# Patient Record
Sex: MALE | Race: WHITE | Employment: OTHER | URBAN - METROPOLITAN AREA
[De-identification: names, ages, dates, MRNs, and addresses within clinical notes are randomized per-mention and may not be internally consistent; named-entity substitution may affect disease eponyms.]

---

## 2017-01-05 ENCOUNTER — OFFICE VISIT (OUTPATIENT)
Dept: FAMILY MEDICINE CLINIC | Age: 62
End: 2017-01-05

## 2017-01-05 VITALS
WEIGHT: 190.8 LBS | SYSTOLIC BLOOD PRESSURE: 120 MMHG | HEIGHT: 69 IN | HEART RATE: 81 BPM | DIASTOLIC BLOOD PRESSURE: 70 MMHG | RESPIRATION RATE: 13 BRPM | BODY MASS INDEX: 28.26 KG/M2

## 2017-01-05 DIAGNOSIS — M17.12 PRIMARY OSTEOARTHRITIS OF LEFT KNEE: ICD-10-CM

## 2017-01-05 DIAGNOSIS — Z12.5 SCREENING PSA (PROSTATE SPECIFIC ANTIGEN): ICD-10-CM

## 2017-01-05 DIAGNOSIS — E66.3 OVERWEIGHT (BMI 25.0-29.9): ICD-10-CM

## 2017-01-05 DIAGNOSIS — R73.01 IMPAIRED FASTING BLOOD SUGAR: ICD-10-CM

## 2017-01-05 DIAGNOSIS — I10 ESSENTIAL HYPERTENSION: Primary | ICD-10-CM

## 2017-01-05 PROCEDURE — 99214 OFFICE O/P EST MOD 30 MIN: CPT | Performed by: NURSE PRACTITIONER

## 2017-01-05 ASSESSMENT — ENCOUNTER SYMPTOMS
COUGH: 0
NAUSEA: 0
SHORTNESS OF BREATH: 0
ABDOMINAL PAIN: 0

## 2017-01-05 ASSESSMENT — PATIENT HEALTH QUESTIONNAIRE - PHQ9
2. FEELING DOWN, DEPRESSED OR HOPELESS: 0
1. LITTLE INTEREST OR PLEASURE IN DOING THINGS: 0
SUM OF ALL RESPONSES TO PHQ QUESTIONS 1-9: 0
SUM OF ALL RESPONSES TO PHQ9 QUESTIONS 1 & 2: 0

## 2017-05-04 ENCOUNTER — OFFICE VISIT (OUTPATIENT)
Dept: FAMILY MEDICINE CLINIC | Age: 62
End: 2017-05-04

## 2017-05-04 VITALS
WEIGHT: 182.8 LBS | RESPIRATION RATE: 13 BRPM | DIASTOLIC BLOOD PRESSURE: 60 MMHG | BODY MASS INDEX: 27.08 KG/M2 | HEIGHT: 69 IN | HEART RATE: 80 BPM | SYSTOLIC BLOOD PRESSURE: 116 MMHG | OXYGEN SATURATION: 98 %

## 2017-05-04 DIAGNOSIS — R63.4 RECENT WEIGHT LOSS: Primary | ICD-10-CM

## 2017-05-04 DIAGNOSIS — R73.01 IMPAIRED FASTING BLOOD SUGAR: ICD-10-CM

## 2017-05-04 DIAGNOSIS — M17.12 PRIMARY OSTEOARTHRITIS OF LEFT KNEE: ICD-10-CM

## 2017-05-04 DIAGNOSIS — I10 ESSENTIAL HYPERTENSION: ICD-10-CM

## 2017-05-04 DIAGNOSIS — Z12.12 SCREENING FOR COLORECTAL CANCER: ICD-10-CM

## 2017-05-04 DIAGNOSIS — Z12.11 SCREENING FOR COLORECTAL CANCER: ICD-10-CM

## 2017-05-04 DIAGNOSIS — M79.10 MYALGIA: ICD-10-CM

## 2017-05-04 PROCEDURE — 99213 OFFICE O/P EST LOW 20 MIN: CPT | Performed by: NURSE PRACTITIONER

## 2017-05-04 ASSESSMENT — ENCOUNTER SYMPTOMS
SHORTNESS OF BREATH: 0
CHEST TIGHTNESS: 0
ABDOMINAL PAIN: 0
COUGH: 0
NAUSEA: 0

## 2017-06-07 ENCOUNTER — TELEPHONE (OUTPATIENT)
Dept: FAMILY MEDICINE CLINIC | Age: 62
End: 2017-06-07

## 2017-07-05 ENCOUNTER — TELEPHONE (OUTPATIENT)
Dept: FAMILY MEDICINE CLINIC | Age: 62
End: 2017-07-05

## 2017-07-05 ENCOUNTER — NURSE TRIAGE (OUTPATIENT)
Dept: ADMINISTRATIVE | Age: 62
End: 2017-07-05

## 2017-07-05 ENCOUNTER — OFFICE VISIT (OUTPATIENT)
Dept: FAMILY MEDICINE CLINIC | Age: 62
End: 2017-07-05

## 2017-07-05 VITALS
OXYGEN SATURATION: 97 % | TEMPERATURE: 98.3 F | SYSTOLIC BLOOD PRESSURE: 100 MMHG | HEIGHT: 69 IN | HEART RATE: 83 BPM | DIASTOLIC BLOOD PRESSURE: 58 MMHG | BODY MASS INDEX: 26.44 KG/M2 | RESPIRATION RATE: 14 BRPM | WEIGHT: 178.5 LBS

## 2017-07-05 DIAGNOSIS — R63.4 UNEXPLAINED WEIGHT LOSS: Primary | ICD-10-CM

## 2017-07-05 PROCEDURE — 99213 OFFICE O/P EST LOW 20 MIN: CPT | Performed by: NURSE PRACTITIONER

## 2017-07-05 ASSESSMENT — ENCOUNTER SYMPTOMS
CONSTIPATION: 0
CHEST TIGHTNESS: 0
ABDOMINAL PAIN: 0
SHORTNESS OF BREATH: 0
NAUSEA: 0
VOMITING: 0
COUGH: 0

## 2017-07-12 ENCOUNTER — TELEPHONE (OUTPATIENT)
Dept: FAMILY MEDICINE CLINIC | Age: 62
End: 2017-07-12

## 2017-07-12 DIAGNOSIS — N52.9 ERECTILE DYSFUNCTION, UNSPECIFIED ERECTILE DYSFUNCTION TYPE: Primary | ICD-10-CM

## 2017-07-12 DIAGNOSIS — I10 ESSENTIAL HYPERTENSION: ICD-10-CM

## 2017-07-13 RX ORDER — BLOOD PRESSURE TEST KIT
KIT MISCELLANEOUS
Qty: 1 KIT | Refills: 0 | Status: ON HOLD | OUTPATIENT
Start: 2017-07-13 | End: 2018-03-17

## 2017-07-13 RX ORDER — VARDENAFIL HYDROCHLORIDE 10 MG/1
TABLET ORAL
Qty: 15 TABLET | Refills: 1 | Status: ON HOLD | OUTPATIENT
Start: 2017-07-13 | End: 2018-03-16

## 2017-09-13 ENCOUNTER — TELEPHONE (OUTPATIENT)
Dept: FAMILY MEDICINE CLINIC | Age: 62
End: 2017-09-13

## 2017-09-13 DIAGNOSIS — L25.5 RHUS DERMATITIS: Primary | ICD-10-CM

## 2017-09-13 RX ORDER — METHYLPREDNISOLONE 4 MG/1
TABLET ORAL
Qty: 1 KIT | Refills: 0 | Status: SHIPPED | OUTPATIENT
Start: 2017-09-13 | End: 2017-09-13 | Stop reason: SDUPTHER

## 2017-09-13 RX ORDER — METHYLPREDNISOLONE 4 MG/1
TABLET ORAL
Qty: 1 KIT | Refills: 0 | Status: SHIPPED | OUTPATIENT
Start: 2017-09-13 | End: 2017-09-19

## 2017-09-25 ENCOUNTER — TELEPHONE (OUTPATIENT)
Dept: FAMILY MEDICINE CLINIC | Age: 62
End: 2017-09-25

## 2017-09-25 ENCOUNTER — OFFICE VISIT (OUTPATIENT)
Dept: FAMILY MEDICINE CLINIC | Age: 62
End: 2017-09-25
Payer: COMMERCIAL

## 2017-09-25 VITALS
DIASTOLIC BLOOD PRESSURE: 72 MMHG | SYSTOLIC BLOOD PRESSURE: 116 MMHG | HEIGHT: 69 IN | BODY MASS INDEX: 25.39 KG/M2 | RESPIRATION RATE: 12 BRPM | HEART RATE: 68 BPM | WEIGHT: 171.4 LBS | OXYGEN SATURATION: 98 % | TEMPERATURE: 97.6 F

## 2017-09-25 DIAGNOSIS — L30.9 DERMATITIS: Primary | ICD-10-CM

## 2017-09-25 PROCEDURE — 96372 THER/PROPH/DIAG INJ SC/IM: CPT | Performed by: FAMILY MEDICINE

## 2017-09-25 PROCEDURE — 99213 OFFICE O/P EST LOW 20 MIN: CPT | Performed by: FAMILY MEDICINE

## 2017-09-25 RX ORDER — METHYLPREDNISOLONE ACETATE 80 MG/ML
80 INJECTION, SUSPENSION INTRA-ARTICULAR; INTRALESIONAL; INTRAMUSCULAR; SOFT TISSUE ONCE
Status: COMPLETED | OUTPATIENT
Start: 2017-09-25 | End: 2017-09-25

## 2017-09-25 RX ORDER — METHYLPREDNISOLONE ACETATE 40 MG/ML
40 INJECTION, SUSPENSION INTRA-ARTICULAR; INTRALESIONAL; INTRAMUSCULAR; SOFT TISSUE ONCE
Status: COMPLETED | OUTPATIENT
Start: 2017-09-25 | End: 2017-09-25

## 2017-09-25 RX ADMIN — METHYLPREDNISOLONE ACETATE 80 MG: 80 INJECTION, SUSPENSION INTRA-ARTICULAR; INTRALESIONAL; INTRAMUSCULAR; SOFT TISSUE at 12:10

## 2017-09-25 RX ADMIN — METHYLPREDNISOLONE ACETATE 40 MG: 40 INJECTION, SUSPENSION INTRA-ARTICULAR; INTRALESIONAL; INTRAMUSCULAR; SOFT TISSUE at 12:10

## 2017-09-25 ASSESSMENT — ENCOUNTER SYMPTOMS
GASTROINTESTINAL NEGATIVE: 1
RESPIRATORY NEGATIVE: 1

## 2017-12-26 ENCOUNTER — OFFICE VISIT (OUTPATIENT)
Dept: FAMILY MEDICINE CLINIC | Age: 62
End: 2017-12-26
Payer: COMMERCIAL

## 2017-12-26 VITALS
HEIGHT: 70 IN | WEIGHT: 183.1 LBS | DIASTOLIC BLOOD PRESSURE: 84 MMHG | SYSTOLIC BLOOD PRESSURE: 138 MMHG | BODY MASS INDEX: 26.21 KG/M2 | RESPIRATION RATE: 20 BRPM | HEART RATE: 80 BPM

## 2017-12-26 DIAGNOSIS — L08.9 BLISTER OF TOE, INFECTED, RIGHT, INITIAL ENCOUNTER: Primary | ICD-10-CM

## 2017-12-26 DIAGNOSIS — S90.424A BLISTER OF TOE, INFECTED, RIGHT, INITIAL ENCOUNTER: Primary | ICD-10-CM

## 2017-12-26 PROCEDURE — 99213 OFFICE O/P EST LOW 20 MIN: CPT | Performed by: NURSE PRACTITIONER

## 2017-12-26 RX ORDER — CEPHALEXIN 500 MG/1
500 CAPSULE ORAL 3 TIMES DAILY
Qty: 30 CAPSULE | Refills: 0 | Status: ON HOLD | OUTPATIENT
Start: 2017-12-26 | End: 2018-03-16 | Stop reason: ALTCHOICE

## 2018-03-06 ENCOUNTER — OFFICE VISIT (OUTPATIENT)
Dept: FAMILY MEDICINE CLINIC | Age: 63
End: 2018-03-06
Payer: COMMERCIAL

## 2018-03-06 ENCOUNTER — TELEPHONE (OUTPATIENT)
Dept: FAMILY MEDICINE CLINIC | Age: 63
End: 2018-03-06

## 2018-03-06 VITALS
HEIGHT: 70 IN | RESPIRATION RATE: 16 BRPM | BODY MASS INDEX: 26.34 KG/M2 | SYSTOLIC BLOOD PRESSURE: 118 MMHG | TEMPERATURE: 97.5 F | HEART RATE: 79 BPM | DIASTOLIC BLOOD PRESSURE: 80 MMHG | WEIGHT: 184 LBS

## 2018-03-06 DIAGNOSIS — Z82.49 FAMILY HISTORY OF CORONARY ARTERY DISEASE: ICD-10-CM

## 2018-03-06 DIAGNOSIS — I20.9 ANGINA PECTORIS (HCC): Primary | ICD-10-CM

## 2018-03-06 DIAGNOSIS — I10 ESSENTIAL HYPERTENSION: ICD-10-CM

## 2018-03-06 DIAGNOSIS — R73.01 IMPAIRED FASTING BLOOD SUGAR: ICD-10-CM

## 2018-03-06 PROCEDURE — 99213 OFFICE O/P EST LOW 20 MIN: CPT | Performed by: NURSE PRACTITIONER

## 2018-03-06 ASSESSMENT — ENCOUNTER SYMPTOMS
ABDOMINAL PAIN: 0
COUGH: 0
NAUSEA: 0
CHEST TIGHTNESS: 0
SHORTNESS OF BREATH: 0

## 2018-03-06 ASSESSMENT — PATIENT HEALTH QUESTIONNAIRE - PHQ9
1. LITTLE INTEREST OR PLEASURE IN DOING THINGS: 0
SUM OF ALL RESPONSES TO PHQ9 QUESTIONS 1 & 2: 0
2. FEELING DOWN, DEPRESSED OR HOPELESS: 0
SUM OF ALL RESPONSES TO PHQ QUESTIONS 1-9: 0

## 2018-03-06 NOTE — PROGRESS NOTES
murmur heard. Pulmonary/Chest: Effort normal and breath sounds normal. He has no decreased breath sounds. He has no wheezes. He has no rhonchi. Abdominal: Soft. Bowel sounds are normal. There is no tenderness. Neurological: He is alert and oriented to person, place, and time. Psychiatric: He has a normal mood and affect. His behavior is normal.       Assessment:      1. Angina pectoris (HCC)  NM MYOCARDIAL SPECT REST EXERCISE OR RX    ECHO Complete 2D W Doppler W Color   2. Impaired fasting blood sugar  Lipid Panel    Comprehensive Metabolic Panel    Hemoglobin A1C    CBC   3. Essential hypertension     4.  Family history of coronary artery disease             Plan:      STRESS and ECHO for anginal pain with activity  If pain does not resolve with rest go to ER  Labs as above  Call with recs after testing

## 2018-03-06 NOTE — TELEPHONE ENCOUNTER
Patient came into the office today for an appointment and his blood pressure reading was 118/80. Patient states that he has not taken his blood pressure medication and is wondering if he should take it or not since his readings were in the normal range.

## 2018-03-16 ENCOUNTER — APPOINTMENT (OUTPATIENT)
Dept: GENERAL RADIOLOGY | Age: 63
End: 2018-03-16
Payer: COMMERCIAL

## 2018-03-16 ENCOUNTER — HOSPITAL ENCOUNTER (OUTPATIENT)
Age: 63
Discharge: HOME | End: 2018-03-16
Payer: COMMERCIAL

## 2018-03-16 ENCOUNTER — HOSPITAL ENCOUNTER (OUTPATIENT)
Age: 63
Setting detail: OBSERVATION
Discharge: HOME | End: 2018-03-17
Attending: INTERNAL MEDICINE | Admitting: INTERNAL MEDICINE
Payer: COMMERCIAL

## 2018-03-16 ENCOUNTER — APPOINTMENT (OUTPATIENT)
Dept: CT IMAGING | Age: 63
End: 2018-03-16
Payer: COMMERCIAL

## 2018-03-16 DIAGNOSIS — R42 POSTURAL DIZZINESS WITH PRESYNCOPE: Primary | ICD-10-CM

## 2018-03-16 DIAGNOSIS — R73.01 IMPAIRED FASTING BLOOD SUGAR: ICD-10-CM

## 2018-03-16 DIAGNOSIS — I10 ESSENTIAL HYPERTENSION: ICD-10-CM

## 2018-03-16 DIAGNOSIS — R55 POSTURAL DIZZINESS WITH PRESYNCOPE: Primary | ICD-10-CM

## 2018-03-16 DIAGNOSIS — R07.9 INTERMITTENT CHEST PAIN: ICD-10-CM

## 2018-03-16 DIAGNOSIS — I95.1 ORTHOSTATIC HYPOTENSION: ICD-10-CM

## 2018-03-16 PROBLEM — E78.2 MIXED DYSLIPIDEMIA: Status: ACTIVE | Noted: 2018-03-16

## 2018-03-16 LAB
ALBUMIN SERPL-MCNC: 4.3 G/DL (ref 3.5–5.1)
ALP BLD-CCNC: 90 U/L (ref 38–126)
ALT SERPL-CCNC: 18 U/L (ref 11–66)
ANION GAP SERPL CALCULATED.3IONS-SCNC: 16 MEQ/L (ref 8–16)
APTT: 26.9 SECONDS (ref 22–38)
AST SERPL-CCNC: 16 U/L (ref 5–40)
AVERAGE GLUCOSE: 111 MG/DL (ref 70–126)
BILIRUB SERPL-MCNC: 0.8 MG/DL (ref 0.3–1.2)
BUN BLDV-MCNC: 17 MG/DL (ref 7–22)
CALCIUM SERPL-MCNC: 10.2 MG/DL (ref 8.5–10.5)
CHLORIDE BLD-SCNC: 98 MEQ/L (ref 98–111)
CHOLESTEROL, TOTAL: 221 MG/DL (ref 100–199)
CO2: 26 MEQ/L (ref 23–33)
CREAT SERPL-MCNC: 1 MG/DL (ref 0.4–1.2)
D-DIMER QUANTITATIVE: 512 NG/ML FEU (ref 0–500)
EKG ATRIAL RATE: 59 BPM
EKG P AXIS: 49 DEGREES
EKG P-R INTERVAL: 196 MS
EKG Q-T INTERVAL: 416 MS
EKG QRS DURATION: 98 MS
EKG QTC CALCULATION (BAZETT): 411 MS
EKG R AXIS: 62 DEGREES
EKG T AXIS: 66 DEGREES
EKG VENTRICULAR RATE: 59 BPM
FLU A ANTIGEN: NEGATIVE
FLU B ANTIGEN: NEGATIVE
GFR SERPL CREATININE-BSD FRML MDRD: 75 ML/MIN/1.73M2
GLUCOSE BLD-MCNC: 99 MG/DL (ref 70–108)
HBA1C MFR BLD: 5.7 % (ref 4.4–6.4)
HCT VFR BLD CALC: 46.6 % (ref 42–52)
HDLC SERPL-MCNC: 58 MG/DL
HEMOGLOBIN: 16 GM/DL (ref 14–18)
INR BLD: 1.1 (ref 0.85–1.13)
LDL CHOLESTEROL CALCULATED: 146 MG/DL
LV EF: 55 %
LVEF MODALITY: NORMAL
MCH RBC QN AUTO: 28.8 PG (ref 27–31)
MCHC RBC AUTO-ENTMCNC: 34.4 GM/DL (ref 33–37)
MCV RBC AUTO: 83.7 FL (ref 80–94)
PDW BLD-RTO: 14.6 % (ref 11.5–14.5)
PLATELET # BLD: 263 THOU/MM3 (ref 130–400)
PMV BLD AUTO: 8 FL (ref 7.4–10.4)
POTASSIUM SERPL-SCNC: 3.5 MEQ/L (ref 3.5–5.2)
RBC # BLD: 5.56 MILL/MM3 (ref 4.7–6.1)
SODIUM BLD-SCNC: 140 MEQ/L (ref 135–145)
TOTAL PROTEIN: 7.7 G/DL (ref 6.1–8)
TRIGL SERPL-MCNC: 85 MG/DL (ref 0–199)
TROPONIN T: < 0.01 NG/ML
WBC # BLD: 5.1 THOU/MM3 (ref 4.8–10.8)

## 2018-03-16 PROCEDURE — 6360000002 HC RX W HCPCS

## 2018-03-16 PROCEDURE — 99220 PR INITIAL OBSERVATION CARE/DAY 70 MINUTES: CPT | Performed by: INTERNAL MEDICINE

## 2018-03-16 PROCEDURE — 93017 CV STRESS TEST TRACING ONLY: CPT | Performed by: INTERNAL MEDICINE

## 2018-03-16 PROCEDURE — 96372 THER/PROPH/DIAG INJ SC/IM: CPT

## 2018-03-16 PROCEDURE — 71275 CT ANGIOGRAPHY CHEST: CPT

## 2018-03-16 PROCEDURE — 85730 THROMBOPLASTIN TIME PARTIAL: CPT

## 2018-03-16 PROCEDURE — 99285 EMERGENCY DEPT VISIT HI MDM: CPT

## 2018-03-16 PROCEDURE — 2580000003 HC RX 258: Performed by: INTERNAL MEDICINE

## 2018-03-16 PROCEDURE — 2580000003 HC RX 258: Performed by: NURSE PRACTITIONER

## 2018-03-16 PROCEDURE — 93005 ELECTROCARDIOGRAM TRACING: CPT | Performed by: INTERNAL MEDICINE

## 2018-03-16 PROCEDURE — A9500 TC99M SESTAMIBI: HCPCS | Performed by: INTERNAL MEDICINE

## 2018-03-16 PROCEDURE — 6360000004 HC RX CONTRAST MEDICATION: Performed by: INTERNAL MEDICINE

## 2018-03-16 PROCEDURE — 85027 COMPLETE CBC AUTOMATED: CPT

## 2018-03-16 PROCEDURE — 6360000002 HC RX W HCPCS: Performed by: INTERNAL MEDICINE

## 2018-03-16 PROCEDURE — G0378 HOSPITAL OBSERVATION PER HR: HCPCS

## 2018-03-16 PROCEDURE — 84484 ASSAY OF TROPONIN QUANT: CPT

## 2018-03-16 PROCEDURE — 71046 X-RAY EXAM CHEST 2 VIEWS: CPT

## 2018-03-16 PROCEDURE — 85379 FIBRIN DEGRADATION QUANT: CPT

## 2018-03-16 PROCEDURE — 80053 COMPREHEN METABOLIC PANEL: CPT

## 2018-03-16 PROCEDURE — 78452 HT MUSCLE IMAGE SPECT MULT: CPT

## 2018-03-16 PROCEDURE — 80061 LIPID PANEL: CPT

## 2018-03-16 PROCEDURE — 83036 HEMOGLOBIN GLYCOSYLATED A1C: CPT

## 2018-03-16 PROCEDURE — 93306 TTE W/DOPPLER COMPLETE: CPT

## 2018-03-16 PROCEDURE — 87804 INFLUENZA ASSAY W/OPTIC: CPT

## 2018-03-16 PROCEDURE — 36415 COLL VENOUS BLD VENIPUNCTURE: CPT

## 2018-03-16 PROCEDURE — 85610 PROTHROMBIN TIME: CPT

## 2018-03-16 PROCEDURE — 3430000000 HC RX DIAGNOSTIC RADIOPHARMACEUTICAL: Performed by: INTERNAL MEDICINE

## 2018-03-16 RX ORDER — ONDANSETRON 2 MG/ML
4 INJECTION INTRAMUSCULAR; INTRAVENOUS EVERY 6 HOURS PRN
Status: DISCONTINUED | OUTPATIENT
Start: 2018-03-16 | End: 2018-03-17 | Stop reason: HOSPADM

## 2018-03-16 RX ORDER — POTASSIUM CHLORIDE 20 MEQ/1
40 TABLET, EXTENDED RELEASE ORAL PRN
Status: DISCONTINUED | OUTPATIENT
Start: 2018-03-16 | End: 2018-03-17 | Stop reason: HOSPADM

## 2018-03-16 RX ORDER — HYDROCODONE BITARTRATE AND ACETAMINOPHEN 5; 325 MG/1; MG/1
1 TABLET ORAL EVERY 4 HOURS PRN
Status: DISCONTINUED | OUTPATIENT
Start: 2018-03-16 | End: 2018-03-17 | Stop reason: HOSPADM

## 2018-03-16 RX ORDER — SODIUM CHLORIDE 0.9 % (FLUSH) 0.9 %
10 SYRINGE (ML) INJECTION EVERY 12 HOURS SCHEDULED
Status: DISCONTINUED | OUTPATIENT
Start: 2018-03-16 | End: 2018-03-17 | Stop reason: HOSPADM

## 2018-03-16 RX ORDER — HYDROCODONE BITARTRATE AND ACETAMINOPHEN 5; 325 MG/1; MG/1
2 TABLET ORAL EVERY 4 HOURS PRN
Status: DISCONTINUED | OUTPATIENT
Start: 2018-03-16 | End: 2018-03-17 | Stop reason: HOSPADM

## 2018-03-16 RX ORDER — FAMOTIDINE 20 MG/1
20 TABLET, FILM COATED ORAL 2 TIMES DAILY
Status: DISCONTINUED | OUTPATIENT
Start: 2018-03-16 | End: 2018-03-17 | Stop reason: HOSPADM

## 2018-03-16 RX ORDER — ACETAMINOPHEN 325 MG/1
650 TABLET ORAL EVERY 4 HOURS PRN
Status: DISCONTINUED | OUTPATIENT
Start: 2018-03-16 | End: 2018-03-17 | Stop reason: HOSPADM

## 2018-03-16 RX ORDER — MORPHINE SULFATE 2 MG/ML
2 INJECTION, SOLUTION INTRAMUSCULAR; INTRAVENOUS
Status: DISCONTINUED | OUTPATIENT
Start: 2018-03-16 | End: 2018-03-17 | Stop reason: HOSPADM

## 2018-03-16 RX ORDER — ATORVASTATIN CALCIUM 20 MG/1
20 TABLET, FILM COATED ORAL NIGHTLY
Status: DISCONTINUED | OUTPATIENT
Start: 2018-03-16 | End: 2018-03-17 | Stop reason: HOSPADM

## 2018-03-16 RX ORDER — SODIUM CHLORIDE 0.9 % (FLUSH) 0.9 %
10 SYRINGE (ML) INJECTION PRN
Status: DISCONTINUED | OUTPATIENT
Start: 2018-03-16 | End: 2018-03-17 | Stop reason: HOSPADM

## 2018-03-16 RX ORDER — POTASSIUM CHLORIDE 20MEQ/15ML
40 LIQUID (ML) ORAL PRN
Status: DISCONTINUED | OUTPATIENT
Start: 2018-03-16 | End: 2018-03-17 | Stop reason: HOSPADM

## 2018-03-16 RX ORDER — 0.9 % SODIUM CHLORIDE 0.9 %
1000 INTRAVENOUS SOLUTION INTRAVENOUS ONCE
Status: COMPLETED | OUTPATIENT
Start: 2018-03-16 | End: 2018-03-16

## 2018-03-16 RX ORDER — SODIUM CHLORIDE 9 MG/ML
INJECTION, SOLUTION INTRAVENOUS CONTINUOUS
Status: DISCONTINUED | OUTPATIENT
Start: 2018-03-16 | End: 2018-03-17 | Stop reason: HOSPADM

## 2018-03-16 RX ORDER — POTASSIUM CHLORIDE 7.45 MG/ML
10 INJECTION INTRAVENOUS PRN
Status: DISCONTINUED | OUTPATIENT
Start: 2018-03-16 | End: 2018-03-17 | Stop reason: HOSPADM

## 2018-03-16 RX ORDER — MORPHINE SULFATE 4 MG/ML
4 INJECTION, SOLUTION INTRAMUSCULAR; INTRAVENOUS
Status: DISCONTINUED | OUTPATIENT
Start: 2018-03-16 | End: 2018-03-17 | Stop reason: HOSPADM

## 2018-03-16 RX ORDER — 0.9 % SODIUM CHLORIDE 0.9 %
1000 INTRAVENOUS SOLUTION INTRAVENOUS ONCE
Status: DISCONTINUED | OUTPATIENT
Start: 2018-03-16 | End: 2018-03-17 | Stop reason: HOSPADM

## 2018-03-16 RX ADMIN — SODIUM CHLORIDE: 9 INJECTION, SOLUTION INTRAVENOUS at 16:20

## 2018-03-16 RX ADMIN — Medication 8.7 MILLICURIE: at 13:15

## 2018-03-16 RX ADMIN — IOPAMIDOL 80 ML: 755 INJECTION, SOLUTION INTRAVENOUS at 18:33

## 2018-03-16 RX ADMIN — Medication 34 MILLICURIE: at 14:20

## 2018-03-16 RX ADMIN — SODIUM CHLORIDE 1000 ML: 9 INJECTION, SOLUTION INTRAVENOUS at 10:46

## 2018-03-16 RX ADMIN — ENOXAPARIN SODIUM 40 MG: 40 INJECTION SUBCUTANEOUS at 16:20

## 2018-03-16 ASSESSMENT — ENCOUNTER SYMPTOMS
EYE REDNESS: 0
CHEST TIGHTNESS: 0
WHEEZING: 0
COUGH: 0
DIARRHEA: 0
BACK PAIN: 0
SORE THROAT: 0
ABDOMINAL PAIN: 0
BLOOD IN STOOL: 0
VOMITING: 0
ABDOMINAL DISTENTION: 0
NAUSEA: 0
RHINORRHEA: 1
PHOTOPHOBIA: 0
VOICE CHANGE: 0
CONSTIPATION: 0
SHORTNESS OF BREATH: 0
SINUS PRESSURE: 0
COLOR CHANGE: 0

## 2018-03-16 ASSESSMENT — HEART SCORE: ECG: 0

## 2018-03-16 NOTE — H&P
recommendations-stress vs Cath    Active Problems:    Liver disease  Remote h/o HAV infection; hepatic panel within normal limits      Benign essential HTN  Currently hypotensive and therefore will hold antihypertensives for now      Mixed dyslipidemia  Start patient on low dose statin awaiting lipid panel report      Intermittent chest pain  This sounds like unstable angina; he is currently not having any chest pain symptoms. Will therefore opt to obtain stress test, but will leave the decision for stress vs LHC to the cardiologist. Patient is on Cialis and currently hypotensive and therefore the use of nitrates is contraindicated    Resolved Problems:    * No resolved hospital problems. *            Thank you Josesito Mascorro NP for the opportunity to be involved in this patient's care.     Electronically signed by Lalit Delgado MD on 3/16/2018 at 11:51 AM

## 2018-03-16 NOTE — ED PROVIDER NOTES
[facility] Emergency Department    CHIEF COMPLAINT       Chief Complaint   Patient presents with    Loss of Consciousness       Nurses Notes reviewed and I agree except as noted in the HPI. HISTORY OF PRESENT ILLNESS    Jovanna Ontiveros is a 58 y.o. male who presents to the ED for evaluation of a near syncopal episode. The patient states that he was having labs drawn today for clearance prior to a cardiac ECHO and stress test that his PCP scheduled. He reports that shortly after having his blood drawn he began to feel very warm, then chilled and lightheaded. He reports to have had a near syncopal episode, denies losing consciousness. He denies any chest pain, shortness of breath, palpitations, or abdominal pain with the near syncope. The patient states that over the past two days he has been feeling generally unwell and chilled. Denies having any nausea, emesis, diarrhea, urinary symptoms, or back pain. He does report having some rhinorrhea and sneezing over the past two days. No cough or other cold like symptoms. No additional complaints or concerns at the time of initial evaluation. Symptom description:  Onset: this morning, just PTA  Symptom: Near syncope   Duration: brief   Character: lightheaded, warm, chilled  Timing: improving   Severity: moderate     Experienced previously: no     HPI was provided by the patient. REVIEW OF SYSTEMS     Review of Systems   Constitutional: Positive for chills. Negative for appetite change, diaphoresis, fatigue, fever and unexpected weight change. Generalized malaise    HENT: Positive for rhinorrhea and sneezing. Negative for congestion, hearing loss, postnasal drip, sinus pressure, sore throat and voice change. Eyes: Negative for photophobia, redness and visual disturbance. Respiratory: Negative for cough, chest tightness, shortness of breath and wheezing. Cardiovascular: Negative for chest pain and palpitations.    Gastrointestinal: Negative for abdominal distention, abdominal pain, blood in stool, constipation, diarrhea, nausea and vomiting. Endocrine: Negative for cold intolerance, heat intolerance, polydipsia, polyphagia and polyuria. Genitourinary: Negative for decreased urine volume, difficulty urinating, dysuria, flank pain and frequency. Musculoskeletal: Negative for arthralgias, back pain, gait problem, joint swelling, neck pain and neck stiffness. Skin: Negative for color change and rash. Allergic/Immunologic: Negative for immunocompromised state. Neurological: Positive for syncope (near) and light-headedness. Negative for dizziness, tremors, weakness, numbness and headaches. Hematological: Does not bruise/bleed easily. Psychiatric/Behavioral: Negative for behavioral problems, confusion, decreased concentration, hallucinations, self-injury and suicidal ideas. The patient is not nervous/anxious. PAST MEDICAL HISTORY    has a past medical history of Arthritis; Hyperlipidemia; Hypertension; and Liver disease. SURGICAL HISTORY      has a past surgical history that includes Carpal tunnel release; Tonsillectomy and adenoidectomy; hernia repair; Cholecystectomy; and Total knee arthroplasty (Left). CURRENT MEDICATIONS       Current Discharge Medication List      CONTINUE these medications which have NOT CHANGED    Details   hydrochlorothiazide (HYDRODIURIL) 25 MG tablet TAKE ONE TABLET BY MOUTH EVERY DAY  Qty: 90 tablet, Refills: 3      amLODIPine (NORVASC) 5 MG tablet TAKE ONE TABLET BY MOUTH EVERY DAY  Qty: 90 tablet, Refills: 3      aspirin 325 MG tablet Take 1 tablet by mouth daily  Qty: 30 tablet, Refills: 3    Comments: Restart after lovenox done      fluocinonide (LIDEX) 0.05 % cream Apply topically 2 times daily.   Qty: 60 g, Refills: 1    Associated Diagnoses: Dermatitis      Blood Pressure KIT Daily  Qty: 1 kit, Refills: 0    Associated Diagnoses: Essential hypertension      sildenafil (VIAGRA) 50 MG tablet Take 2 tablets by mouth as needed for Erectile Dysfunction  Qty: 8 tablet, Refills: 0    Associated Diagnoses: Erectile dysfunction, unspecified erectile dysfunction type             ALLERGIES     is allergic to pepto-bismol [bismuth subsalicylate]. FAMILY HISTORY     indicated that his mother is alive. He indicated that his father is . He indicated that his brother is alive. family history includes Arthritis in his father and mother; Cancer in his brother; Heart Disease in his father and mother; High Blood Pressure in his mother; High Cholesterol in his mother; Joetta Mcardle / Ericki in his mother. SOCIAL HISTORY      reports that he has never smoked. He has never used smokeless tobacco. He reports that he does not drink alcohol or use drugs. PHYSICAL EXAM     INITIAL VITALS:  height is 5' 10\" (1.778 m) and weight is 176 lb 11.2 oz (80.2 kg). His oral temperature is 97.6 °F (36.4 °C). His blood pressure is 123/62 and his pulse is 67. His respiration is 17 and oxygen saturation is 97%. Physical Exam   Constitutional: He is oriented to person, place, and time. He appears well-developed and well-nourished. He appears ill. HENT:   Head: Normocephalic. Right Ear: External ear normal.   Left Ear: External ear normal.   Nose: Nose normal.   Mouth/Throat: Uvula is midline and oropharynx is clear and moist.   Eyes: Conjunctivae and EOM are normal. Pupils are equal, round, and reactive to light. Neck: Normal range of motion. Neck supple. Cardiovascular: Normal rate, regular rhythm, S1 normal, S2 normal, normal heart sounds and intact distal pulses. Pulmonary/Chest: Effort normal and breath sounds normal. No respiratory distress. He exhibits no tenderness. Abdominal: Soft. Normal appearance and bowel sounds are normal. He exhibits no distension. There is no tenderness. Musculoskeletal: Normal range of motion. Neurological: He is alert and oriented to person, place, and time.    Skin: Skin is warm and dry. No rash noted. No erythema. No pallor. Psychiatric: His behavior is normal. Judgment and thought content normal.   Nursing note and vitals reviewed. DIFFERENTIAL DIAGNOSIS:   Electrolyte imbalance, vasovagal syncope, orthostatic hypotension     DIAGNOSTIC RESULTS     EKG: All EKG's are interpreted by the Emergency Department Physician who either signs or Co-signs this chart in the absence of a cardiologist.    EKG read and interpreted by myself gives impression of sinus bradycardia with heart rate of 59; interval 196; QRS 98;QTc 411; axis 49/62/66. No STEMI. RADIOLOGY: non-plain film images(s) such as CT, Ultrasound and MRI are read by the radiologist.  Plain radiographic images are visualized and preliminarily interpreted by the emergency physician unless otherwise stated below. XR CHEST STANDARD (2 VW)   Final Result      No acute cardiopulmonary disease. **This report has been created using voice recognition software. It may contain minor errors which are inherent in voice recognition technology. **      Final report electronically signed by Dr. Ayush Carbajal on 3/16/2018 12:30 PM      CTA CHEST [address]    (Results Pending)         LABS:   Labs Reviewed   D-DIMER, QUANTITATIVE - Abnormal; Notable for the following:        Result Value    D-Dimer, Quant 512.00 (*)     All other components within normal limits   RAPID INFLUENZA A/B ANTIGENS   TROPONIN   PROTIME-INR   APTT   TROPONIN   TROPONIN   TROPONIN       EMERGENCY DEPARTMENT COURSE:   Vitals:    Vitals:    03/16/18 0902 03/16/18 1017 03/16/18 1154 03/16/18 1514   BP: 112/72  113/72 123/62   Pulse: 62 63 66 67   Resp: 14  16 17   Temp: 97.6 °F (36.4 °C)   97.6 °F (36.4 °C)   TempSrc: Oral   Oral   SpO2: 94%  96% 97%   Weight:   184 lb (83.5 kg) 176 lb 11.2 oz (80.2 kg)   Height:   5' 10\" (1.778 m) 5' 10\" (1.778 m)       MDM    Medications   0.9 % sodium chloride bolus (not administered)   sodium chloride flush 0.9 %

## 2018-03-17 VITALS
TEMPERATURE: 98.6 F | HEART RATE: 67 BPM | OXYGEN SATURATION: 96 % | HEIGHT: 70 IN | DIASTOLIC BLOOD PRESSURE: 69 MMHG | WEIGHT: 179.2 LBS | SYSTOLIC BLOOD PRESSURE: 127 MMHG | BODY MASS INDEX: 25.65 KG/M2 | RESPIRATION RATE: 18 BRPM

## 2018-03-17 LAB
ALBUMIN SERPL-MCNC: 3.4 G/DL (ref 3.5–5.1)
ALP BLD-CCNC: 73 U/L (ref 38–126)
ALT SERPL-CCNC: 13 U/L (ref 11–66)
ANION GAP SERPL CALCULATED.3IONS-SCNC: 13 MEQ/L (ref 8–16)
ANISOCYTOSIS: ABNORMAL
APTT: 28.4 SECONDS (ref 22–38)
AST SERPL-CCNC: 11 U/L (ref 5–40)
BASOPHILS # BLD: 0.5 %
BASOPHILS ABSOLUTE: 0 THOU/MM3 (ref 0–0.1)
BILIRUB SERPL-MCNC: 0.3 MG/DL (ref 0.3–1.2)
BUN BLDV-MCNC: 19 MG/DL (ref 7–22)
CALCIUM SERPL-MCNC: 8.6 MG/DL (ref 8.5–10.5)
CHLORIDE BLD-SCNC: 104 MEQ/L (ref 98–111)
CHOLESTEROL, TOTAL: 181 MG/DL (ref 100–199)
CO2: 24 MEQ/L (ref 23–33)
CREAT SERPL-MCNC: 1.2 MG/DL (ref 0.4–1.2)
EKG ATRIAL RATE: 59 BPM
EKG P AXIS: 51 DEGREES
EKG P-R INTERVAL: 210 MS
EKG Q-T INTERVAL: 416 MS
EKG QRS DURATION: 96 MS
EKG QTC CALCULATION (BAZETT): 411 MS
EKG R AXIS: 58 DEGREES
EKG T AXIS: 68 DEGREES
EKG VENTRICULAR RATE: 59 BPM
EOSINOPHIL # BLD: 2.7 %
EOSINOPHILS ABSOLUTE: 0.1 THOU/MM3 (ref 0–0.4)
GFR SERPL CREATININE-BSD FRML MDRD: 61 ML/MIN/1.73M2
GLUCOSE BLD-MCNC: 127 MG/DL (ref 70–108)
HCT VFR BLD CALC: 41.3 % (ref 42–52)
HDLC SERPL-MCNC: 41 MG/DL
HEMOGLOBIN: 13.8 GM/DL (ref 14–18)
INR BLD: 1.07 (ref 0.85–1.13)
LDL CHOLESTEROL CALCULATED: 120 MG/DL
LYMPHOCYTES # BLD: 27.7 %
LYMPHOCYTES ABSOLUTE: 1.5 THOU/MM3 (ref 1–4.8)
MAGNESIUM: 2.2 MG/DL (ref 1.6–2.4)
MCH RBC QN AUTO: 27.8 PG (ref 27–31)
MCHC RBC AUTO-ENTMCNC: 33.5 GM/DL (ref 33–37)
MCV RBC AUTO: 83.2 FL (ref 80–94)
MONOCYTES # BLD: 8.2 %
MONOCYTES ABSOLUTE: 0.4 THOU/MM3 (ref 0.4–1.3)
NUCLEATED RED BLOOD CELLS: 0 /100 WBC
PDW BLD-RTO: 15.6 % (ref 11.5–14.5)
PLATELET # BLD: 276 THOU/MM3 (ref 130–400)
PMV BLD AUTO: 7.8 FL (ref 7.4–10.4)
POTASSIUM REFLEX MAGNESIUM: 3.3 MEQ/L (ref 3.5–5.2)
POTASSIUM SERPL-SCNC: 3.9 MEQ/L (ref 3.5–5.2)
RBC # BLD: 4.96 MILL/MM3 (ref 4.7–6.1)
SEG NEUTROPHILS: 60.9 %
SEGMENTED NEUTROPHILS ABSOLUTE COUNT: 3.2 THOU/MM3 (ref 1.8–7.7)
SODIUM BLD-SCNC: 141 MEQ/L (ref 135–145)
TOTAL PROTEIN: 5.8 G/DL (ref 6.1–8)
TRIGL SERPL-MCNC: 100 MG/DL (ref 0–199)
TROPONIN T: < 0.01 NG/ML
WBC # BLD: 5.3 THOU/MM3 (ref 4.8–10.8)

## 2018-03-17 PROCEDURE — 99217 PR OBSERVATION CARE DISCHARGE MANAGEMENT: CPT | Performed by: INTERNAL MEDICINE

## 2018-03-17 PROCEDURE — 93005 ELECTROCARDIOGRAM TRACING: CPT | Performed by: INTERNAL MEDICINE

## 2018-03-17 PROCEDURE — 84132 ASSAY OF SERUM POTASSIUM: CPT

## 2018-03-17 PROCEDURE — 85025 COMPLETE CBC W/AUTO DIFF WBC: CPT

## 2018-03-17 PROCEDURE — 93010 ELECTROCARDIOGRAM REPORT: CPT | Performed by: INTERNAL MEDICINE

## 2018-03-17 PROCEDURE — 85730 THROMBOPLASTIN TIME PARTIAL: CPT

## 2018-03-17 PROCEDURE — 80061 LIPID PANEL: CPT

## 2018-03-17 PROCEDURE — 99219 PR INITIAL OBSERVATION CARE/DAY 50 MINUTES: CPT | Performed by: INTERNAL MEDICINE

## 2018-03-17 PROCEDURE — G0378 HOSPITAL OBSERVATION PER HR: HCPCS

## 2018-03-17 PROCEDURE — 2580000003 HC RX 258: Performed by: INTERNAL MEDICINE

## 2018-03-17 PROCEDURE — 85610 PROTHROMBIN TIME: CPT

## 2018-03-17 PROCEDURE — 6370000000 HC RX 637 (ALT 250 FOR IP): Performed by: INTERNAL MEDICINE

## 2018-03-17 PROCEDURE — 84484 ASSAY OF TROPONIN QUANT: CPT

## 2018-03-17 PROCEDURE — 83735 ASSAY OF MAGNESIUM: CPT

## 2018-03-17 PROCEDURE — 36415 COLL VENOUS BLD VENIPUNCTURE: CPT

## 2018-03-17 PROCEDURE — 80053 COMPREHEN METABOLIC PANEL: CPT

## 2018-03-17 RX ORDER — POTASSIUM CHLORIDE 20 MEQ/1
40 TABLET, EXTENDED RELEASE ORAL ONCE
Status: COMPLETED | OUTPATIENT
Start: 2018-03-17 | End: 2018-03-17

## 2018-03-17 RX ORDER — BLOOD PRESSURE TEST KIT
KIT MISCELLANEOUS
Qty: 1 KIT | Refills: 0 | Status: SHIPPED | OUTPATIENT
Start: 2018-03-17 | End: 2018-05-04 | Stop reason: ALTCHOICE

## 2018-03-17 RX ORDER — POTASSIUM CHLORIDE 20 MEQ/1
40 TABLET, EXTENDED RELEASE ORAL EVERY 4 HOURS
Status: DISCONTINUED | OUTPATIENT
Start: 2018-03-17 | End: 2018-03-17

## 2018-03-17 RX ADMIN — POTASSIUM CHLORIDE 40 MEQ: 1500 TABLET, EXTENDED RELEASE ORAL at 10:28

## 2018-03-17 RX ADMIN — ASPIRIN 325 MG: 325 TABLET, DELAYED RELEASE ORAL at 10:28

## 2018-03-17 RX ADMIN — SODIUM CHLORIDE: 9 INJECTION, SOLUTION INTRAVENOUS at 03:53

## 2018-03-17 RX ADMIN — SODIUM CHLORIDE: 9 INJECTION, SOLUTION INTRAVENOUS at 14:25

## 2018-03-17 NOTE — DISCHARGE SUMMARY
Hospital Medicine Discharge Summary      Patient Identification:   Hilda Whipple   : 1955  MRN: [id number]   Account: [de-identified]      Patient's PCP: Bonnie Nelson NP    Admit Date: 3/16/2018     Discharge Date:   3/17/2018    Admitting Physician: Loletta Lesch, MD     Discharge Physician: Loletta Lesch, MD     Discharge Diagnoses: Active Hospital Problems    Diagnosis Date Noted    Near syncope [R55] 2018    Benign essential HTN [I10] 2018    Mixed dyslipidemia [E78.2] 2018    Intermittent chest pain [R07.9] 2018    Liver disease [K76.9] 2016       The patient was seen and examined on day of discharge and this discharge summary is in conjunction with any daily progress note from day of discharge. Hospital Course:   Hilda Whipple is a 58 y.o. male admitted to [facility] on 3/16/2018 with a near syncopal episode for further w/u and management. Patient had been experiencing intermittent exertional chest pain since 2018. He had been seen at his PCP's office on 3/6/2018 and had been scheduled for stress test and 2D Echo. He was in this facility for lab draw after which he developed the near syncopal episode. He was found to be hypotensive and was therefore given a liter bolus. He was still hypotensive with his SBP < 100 when seen and examined. He denied any chest pain, palpitation, diaphoresis, dizziness, nausea or vomiting. Patient's wife was at his bedside clarified most of the history. .        Patient has had extensive w/u including 2D Echo, Lexiscan stress test, and CTA chest which were all unremarkable. Patient was hypotensive and orthostatic and therefore required IV fluid boluses and hydration. Patient has hypokalemia and that was due to his HCTZ intake. Both antihypertensives were held. His BP is markedly improved and therefore is being discharged home.  He will be off the antihypertensives and check his BP and document and follow up with his PCP. Discharged in a stable state. Lipid panel was within normal limits. Exam:     Vitals:  Vitals:    03/16/18 2306 03/17/18 0341 03/17/18 0818 03/17/18 1221   BP: (!) 105/55 111/63 119/61 120/71   Pulse: 70 65 73 78   Resp: 20 20 18   Temp: 98.1 °F (36.7 °C) 98.2 °F (36.8 °C) 98.3 °F (36.8 °C) 97.8 °F (36.6 °C)   TempSrc: Oral Oral Oral Oral   SpO2: 93% 94% 96% 96%   Weight:  179 lb 3.2 oz (81.3 kg)     Height:         Weight: Weight: 179 lb 3.2 oz (81.3 kg)     24 hour intake/output:  Intake/Output Summary (Last 24 hours) at 03/17/18 1612  Last data filed at 03/17/18 1426   Gross per 24 hour   Intake           3533.4 ml   Output                0 ml   Net           3533.4 ml         General appearance:  No apparent distress, appears stated age and cooperative. HEENT:  Normal cephalic, atraumatic without obvious deformity. Pupils equal, round, and reactive to light. Extra ocular muscles intact. Conjunctivae/corneas clear. Neck: Supple, with full range of motion. No jugular venous distention. Trachea midline. Respiratory:  Normal respiratory effort. Clear to auscultation, bilaterally without Rales/Wheezes/Rhonchi. Cardiovascular:  Regular rate and rhythm with normal S1/S2 without murmurs, rubs or gallops. Abdomen: Soft, non-tender, non-distended with normal bowel sounds. Musculoskeletal:  No clubbing, cyanosis or edema bilaterally. Full range of motion without deformity. Skin: Skin color, texture, turgor normal.  No rashes or lesions. Neurologic:  Neurovascularly intact without any focal sensory/motor deficits. Cranial nerves: II-XII intact, grossly non-focal.  Psychiatric:  Alert and oriented, thought content appropriate, normal insight  Capillary Refill: Brisk,< 3 seconds   Peripheral Pulses: +2 palpable, equal bilaterally       Labs:  For convenience and continuity at follow-up the following most recent labs are provided:      CBC:    Lab Results   Component Value Date    WBC 5.3 03/17/2018    HGB 13.8 03/17/2018    HCT 41.3 03/17/2018     03/17/2018       Renal:  Lab Results   Component Value Date     03/17/2018    K 3.9 03/17/2018    K 3.3 03/17/2018     03/17/2018    CO2 24 03/17/2018    BUN 19 03/17/2018    CREATININE 1.2 03/17/2018    CALCIUM 8.6 03/17/2018         Significant Diagnostic Studies    Radiology:   CTA CHEST W WO CONTRAST   Final Result      No acute pulmonary embolism. No lobar consolidation. A left thyroid lobe nodule. Correlation with serology and ultrasound if clinically indicated. **This report has been created using voice recognition software. It may contain minor errors which are inherent in voice recognition technology. **      Final report electronically signed by Dr. Nga De Leon on 3/16/2018 6:55 PM      XR CHEST STANDARD (2 VW)   Final Result      No acute cardiopulmonary disease. **This report has been created using voice recognition software. It may contain minor errors which are inherent in voice recognition technology. **      Final report electronically signed by Dr. Nga De Leon on 3/16/2018 12:30 PM             Consults:     IP CONSULT TO CARDIOLOGY    Disposition:    [x] Home       [] TCU       [] Rehab       [] Psych       [] SNF       [] [place]       [] Other-    Condition at Discharge: Stable    Code Status:  Full Code     Patient Instructions:    Discharge lab work: none  Activity: activity as tolerated  Diet: DIET CARDIAC; No Added Salt (3-4 GM)      Follow-up visits:   Krysta Jade NP  [address] [facility], [address] [address]  [address]               Discharge Medications:      Fabric[address] Tru[address] Medication Instructions JST:[id number]    Printed on:03/17/18 1612   Medication Information                      aspirin 325 MG tablet  Take 1 tablet by mouth daily             Blood Pressure KIT  Daily             fluocinonide (LIDEX) 0.05 % cream  Apply topically 2 times daily.

## 2018-03-17 NOTE — FLOWSHEET NOTE
03/17/18 1535   Encounter Summary   Services provided to: Patient and family together   Referral/Consult From: [facility]   Support System Spouse   Continue Visiting Yes  (3/17/18)   Complexity of Encounter Moderate   Length of Encounter 15 minutes   Spiritual Assessment Completed Yes   Spiritual/Scientology   Type Spiritual support   Assessment Approachable; Hopeful   Intervention Active listening;Nurtured hope;Prayer   Outcome Expressed gratitude   3/17/18  S. Wife woke him when I came into the room. O. We chatted briefly about his health issues. 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 Prayed with them both. P. Recommend spiritual care follow up.

## 2018-03-17 NOTE — DISCHARGE INSTR - DIET

## 2018-03-18 NOTE — CONSULTS
[facility]        Cardiology Consultation/ History of present illness      Patient:  Vahe Aguilera  YOB: 1955    MRN: [id number]     Acct: [de-identified]    PCP: Keli Hamilton NP    Date of Admission: 3/16/2018      Chief Complaint:  Near syncopal episode      History Of Present Illness:     58 y.o. male presented to [address] [place] [address] with a near syncopal episode. Patient had been experiencing intermittent exertional chest pain since January 2018. He had been seen at his PCP's office on 3/6/2018 and had been scheduled for stress test and 2D Echo. He was in this facility for lab draw after which he developed the near syncopal episode. He was found to be hypotensive and was treated with IVF. No symptoms at present. Past Medical History:          Diagnosis Date    Arthritis     Hyperlipidemia     Hypertension     Liver disease     Hep A 18 years ago       Past Surgical History:          Procedure Laterality Date    CARPAL TUNNEL RELEASE      CHOLECYSTECTOMY      HERNIA REPAIR      TONSILLECTOMY AND ADENOIDECTOMY      TOTAL KNEE ARTHROPLASTY Left        Medications Prior to Admission:      Prior to Admission medications    Medication Sig Start Date End Date Taking? Authorizing Provider   Blood Pressure KIT Daily 3/17/18  Yes Ruddy Kraft MD   aspirin 325 MG tablet Take 1 tablet by mouth daily 3/2/16  Yes Bishop Francoise MD   fluocinonide (LIDEX) 0.05 % cream Apply topically 2 times daily. 9/25/17   Ryann Tamayo DO   sildenafil (VIAGRA) 50 MG tablet Take 2 tablets by mouth as needed for Erectile Dysfunction 5/17/16   Keli Hamilton NP       Allergies:  Pepto-bismol [bismuth subsalicylate]    Social History:        TOBACCO:   reports that he has never smoked. He has never used smokeless tobacco.  ETOH:   reports that he does not drink alcohol.       Family History:            Problem Relation Age of Onset    Heart Disease Mother

## 2018-03-19 ENCOUNTER — TELEPHONE (OUTPATIENT)
Dept: FAMILY MEDICINE CLINIC | Age: 63
End: 2018-03-19

## 2018-03-21 ENCOUNTER — OFFICE VISIT (OUTPATIENT)
Dept: FAMILY MEDICINE CLINIC | Age: 63
End: 2018-03-21
Payer: COMMERCIAL

## 2018-03-21 VITALS
HEART RATE: 52 BPM | BODY MASS INDEX: 25.67 KG/M2 | WEIGHT: 179.3 LBS | DIASTOLIC BLOOD PRESSURE: 72 MMHG | SYSTOLIC BLOOD PRESSURE: 132 MMHG | HEIGHT: 70 IN | RESPIRATION RATE: 12 BRPM | TEMPERATURE: 98.1 F

## 2018-03-21 DIAGNOSIS — R55 VASOVAGAL SYNCOPE: Primary | ICD-10-CM

## 2018-03-21 DIAGNOSIS — E78.2 MIXED DYSLIPIDEMIA: ICD-10-CM

## 2018-03-21 DIAGNOSIS — E04.1 LEFT THYROID NODULE: ICD-10-CM

## 2018-03-21 DIAGNOSIS — R73.01 IMPAIRED FASTING BLOOD SUGAR: ICD-10-CM

## 2018-03-21 DIAGNOSIS — Z12.11 ENCOUNTER FOR COLORECTAL CANCER SCREENING: ICD-10-CM

## 2018-03-21 DIAGNOSIS — Z12.12 ENCOUNTER FOR COLORECTAL CANCER SCREENING: ICD-10-CM

## 2018-03-21 PROCEDURE — 99495 TRANSJ CARE MGMT MOD F2F 14D: CPT | Performed by: NURSE PRACTITIONER

## 2018-03-21 NOTE — PROGRESS NOTES
Patient scheduled at [facility] for thyroid ultrasound on 3/26/18 at 3:30pm.  Patient to arrive at 3:00pm to main radiology. Patient notified at appt.
due to his HCTZ intake. Both antihypertensives were held. His BP is markedly improved and therefore is being discharged home. He will be off the antihypertensives and check his BP and document and follow up with his PCP. Discharged in a stable state. Lipid panel was within normal limits.             Allergies   Allergen Reactions    Pepto-Bismol [Bismuth Subsalicylate]      Vomiting     Outpatient Prescriptions Marked as Taking for the 3/21/18 encounter (Office Visit) with Shira Dunaway NP   Medication Sig Dispense Refill    Blood Pressure KIT Daily 1 kit 0    sildenafil (VIAGRA) 50 MG tablet Take 2 tablets by mouth as needed for Erectile Dysfunction 8 tablet 0    aspirin 325 MG tablet Take 1 tablet by mouth daily 30 tablet 3         Vitals:    03/21/18 1402   BP: 132/72   Site: Right Arm   Position: Sitting   Cuff Size: Medium Adult   Pulse: 52   Resp: 12   Temp: 98.1 °F (36.7 °C)   TempSrc: Oral   Weight: 179 lb 4.8 oz (81.3 kg)   Height: 5' 10\" (1.778 m)     Body mass index is 25.73 kg/m². Wt Readings from Last 3 Encounters:   03/21/18 179 lb 4.8 oz (81.3 kg)   03/17/18 179 lb 3.2 oz (81.3 kg)   03/06/18 184 lb (83.5 kg)     BP Readings from Last 3 Encounters:   03/21/18 132/72   03/17/18 127/69   03/06/18 118/80        Patient was admitted to [facility] from 3/16/18 to 3/17/18 for Syncope. Inpatient course: Discharge summary reviewed- see chart. Current status: Denies lightheadedness or dizziness. Review of Systems:  Review of Systems   Constitutional: Negative for chills and fever. HENT: Negative. Respiratory: Negative for cough and shortness of breath. Cardiovascular: Negative for chest pain. Gastrointestinal: Negative for abdominal pain and nausea. Skin: Negative for rash. Neurological: Negative for dizziness, light-headedness and headaches. Psychiatric/Behavioral: Negative.         Physical Exam:  Physical Exam   Constitutional: He is oriented to person,

## 2018-03-24 ASSESSMENT — ENCOUNTER SYMPTOMS
ABDOMINAL PAIN: 0
COUGH: 0
SHORTNESS OF BREATH: 0
NAUSEA: 0

## 2018-03-26 ENCOUNTER — HOSPITAL ENCOUNTER (OUTPATIENT)
Dept: ULTRASOUND IMAGING | Age: 63
Discharge: HOME | End: 2018-03-26
Payer: COMMERCIAL

## 2018-03-26 DIAGNOSIS — E04.1 LEFT THYROID NODULE: ICD-10-CM

## 2018-03-26 PROCEDURE — 76536 US EXAM OF HEAD AND NECK: CPT

## 2018-03-29 DIAGNOSIS — E04.1 RIGHT THYROID NODULE: Primary | ICD-10-CM

## 2018-04-27 ENCOUNTER — HOSPITAL ENCOUNTER (OUTPATIENT)
Dept: ULTRASOUND IMAGING | Age: 63
Discharge: HOME | End: 2018-04-27
Payer: COMMERCIAL

## 2018-04-27 DIAGNOSIS — E04.1 THYROID NODULE: ICD-10-CM

## 2018-04-27 PROCEDURE — 88177 CYTP FNA EVAL EA ADDL: CPT

## 2018-04-27 PROCEDURE — 88172 CYTP DX EVAL FNA 1ST EA SITE: CPT

## 2018-04-27 PROCEDURE — 88173 CYTOPATH EVAL FNA REPORT: CPT

## 2018-04-27 PROCEDURE — 76942 ECHO GUIDE FOR BIOPSY: CPT

## 2018-05-02 ENCOUNTER — TELEPHONE (OUTPATIENT)
Dept: FAMILY MEDICINE CLINIC | Age: 63
End: 2018-05-02

## 2018-05-02 DIAGNOSIS — C73 PAPILLARY THYROID CARCINOMA (HCC): Primary | ICD-10-CM

## 2018-05-04 ENCOUNTER — OFFICE VISIT (OUTPATIENT)
Dept: ENT CLINIC | Age: 63
End: 2018-05-04
Payer: COMMERCIAL

## 2018-05-04 VITALS
TEMPERATURE: 98 F | BODY MASS INDEX: 25.77 KG/M2 | HEIGHT: 70 IN | DIASTOLIC BLOOD PRESSURE: 70 MMHG | SYSTOLIC BLOOD PRESSURE: 128 MMHG | HEART RATE: 88 BPM | WEIGHT: 180 LBS | RESPIRATION RATE: 12 BRPM

## 2018-05-04 DIAGNOSIS — C73 PRIMARY THYROID PAPILLARY CARCINOMA (HCC): Primary | ICD-10-CM

## 2018-05-04 DIAGNOSIS — C77.9: ICD-10-CM

## 2018-05-04 PROCEDURE — 99243 OFF/OP CNSLTJ NEW/EST LOW 30: CPT | Performed by: OTOLARYNGOLOGY

## 2018-05-04 PROCEDURE — 31575 DIAGNOSTIC LARYNGOSCOPY: CPT | Performed by: OTOLARYNGOLOGY

## 2018-05-04 ASSESSMENT — ENCOUNTER SYMPTOMS
CHOKING: 0
COUGH: 0
SINUS PRESSURE: 0
STRIDOR: 0
ABDOMINAL PAIN: 0
VOICE CHANGE: 0
CHEST TIGHTNESS: 0
SORE THROAT: 0
RHINORRHEA: 0
SHORTNESS OF BREATH: 0
DIARRHEA: 0
COLOR CHANGE: 0
VOMITING: 0
NAUSEA: 0
WHEEZING: 0
APNEA: 0
FACIAL SWELLING: 0
TROUBLE SWALLOWING: 0

## 2018-05-08 ENCOUNTER — TELEPHONE (OUTPATIENT)
Dept: ENT CLINIC | Age: 63
End: 2018-05-08

## 2018-05-23 ENCOUNTER — OFFICE VISIT (OUTPATIENT)
Dept: FAMILY MEDICINE CLINIC | Age: 63
End: 2018-05-23
Payer: COMMERCIAL

## 2018-05-23 VITALS
SYSTOLIC BLOOD PRESSURE: 132 MMHG | OXYGEN SATURATION: 96 % | RESPIRATION RATE: 16 BRPM | BODY MASS INDEX: 28.04 KG/M2 | WEIGHT: 185 LBS | DIASTOLIC BLOOD PRESSURE: 72 MMHG | HEIGHT: 68 IN | HEART RATE: 76 BPM

## 2018-05-23 DIAGNOSIS — L02.11 ABSCESS OF SKIN OF NECK: Primary | ICD-10-CM

## 2018-05-23 PROCEDURE — 99213 OFFICE O/P EST LOW 20 MIN: CPT | Performed by: NURSE PRACTITIONER

## 2018-05-23 PROCEDURE — 10060 I&D ABSCESS SIMPLE/SINGLE: CPT | Performed by: NURSE PRACTITIONER

## 2018-05-23 RX ORDER — SULFAMETHOXAZOLE AND TRIMETHOPRIM 800; 160 MG/1; MG/1
1 TABLET ORAL 2 TIMES DAILY
Qty: 20 TABLET | Refills: 0 | Status: SHIPPED | OUTPATIENT
Start: 2018-05-23 | End: 2018-06-02

## 2018-05-25 ENCOUNTER — OFFICE VISIT (OUTPATIENT)
Dept: FAMILY MEDICINE CLINIC | Age: 63
End: 2018-05-25

## 2018-05-25 VITALS
DIASTOLIC BLOOD PRESSURE: 84 MMHG | HEIGHT: 68 IN | WEIGHT: 183.5 LBS | TEMPERATURE: 97.4 F | HEART RATE: 68 BPM | SYSTOLIC BLOOD PRESSURE: 156 MMHG | RESPIRATION RATE: 16 BRPM | BODY MASS INDEX: 27.81 KG/M2

## 2018-05-25 DIAGNOSIS — L02.11 ABSCESS OF SKIN OF NECK: Primary | ICD-10-CM

## 2018-05-25 LAB — AEROBIC CULTURE: NORMAL

## 2018-05-25 PROCEDURE — 99024 POSTOP FOLLOW-UP VISIT: CPT | Performed by: NURSE PRACTITIONER

## 2018-05-29 ENCOUNTER — TELEPHONE (OUTPATIENT)
Dept: FAMILY MEDICINE CLINIC | Age: 63
End: 2018-05-29

## 2018-06-01 ENCOUNTER — TELEPHONE (OUTPATIENT)
Dept: FAMILY MEDICINE CLINIC | Age: 63
End: 2018-06-01

## 2018-06-07 ENCOUNTER — TELEPHONE (OUTPATIENT)
Dept: FAMILY MEDICINE CLINIC | Age: 63
End: 2018-06-07

## 2018-06-11 ENCOUNTER — OFFICE VISIT (OUTPATIENT)
Dept: FAMILY MEDICINE CLINIC | Age: 63
End: 2018-06-11
Payer: COMMERCIAL

## 2018-06-11 VITALS
DIASTOLIC BLOOD PRESSURE: 68 MMHG | HEIGHT: 68 IN | BODY MASS INDEX: 27.19 KG/M2 | OXYGEN SATURATION: 90 % | SYSTOLIC BLOOD PRESSURE: 112 MMHG | WEIGHT: 179.4 LBS | RESPIRATION RATE: 16 BRPM | HEART RATE: 68 BPM

## 2018-06-11 DIAGNOSIS — E89.0 S/P THYROIDECTOMY: ICD-10-CM

## 2018-06-11 DIAGNOSIS — I21.4 NSTEMI (NON-ST ELEVATED MYOCARDIAL INFARCTION) (HCC): ICD-10-CM

## 2018-06-11 DIAGNOSIS — Z98.890 S/P CORONARY ANGIOGRAM: ICD-10-CM

## 2018-06-11 DIAGNOSIS — I25.10 CORONARY ARTERY DISEASE INVOLVING NATIVE HEART WITHOUT ANGINA PECTORIS, UNSPECIFIED VESSEL OR LESION TYPE: ICD-10-CM

## 2018-06-11 DIAGNOSIS — C73 PAPILLARY THYROID CARCINOMA (HCC): Primary | ICD-10-CM

## 2018-06-11 DIAGNOSIS — I10 ESSENTIAL HYPERTENSION: ICD-10-CM

## 2018-06-11 PROCEDURE — 99214 OFFICE O/P EST MOD 30 MIN: CPT | Performed by: NURSE PRACTITIONER

## 2018-06-11 RX ORDER — LEVOTHYROXINE SODIUM 0.12 MG/1
TABLET ORAL
COMMUNITY
Start: 2018-06-03

## 2018-06-11 RX ORDER — IRON POLYSACCHARIDE COMPLEX 150 MG
300 CAPSULE ORAL
COMMUNITY
Start: 2018-06-04 | End: 2018-07-05 | Stop reason: SDUPTHER

## 2018-06-11 RX ORDER — NITROGLYCERIN 0.4 MG/1
TABLET SUBLINGUAL
COMMUNITY
Start: 2018-06-04 | End: 2018-09-10 | Stop reason: SDUPTHER

## 2018-06-11 RX ORDER — METOPROLOL SUCCINATE 25 MG/1
TABLET, EXTENDED RELEASE ORAL
COMMUNITY
Start: 2018-06-03 | End: 2018-07-05 | Stop reason: SDUPTHER

## 2018-06-11 RX ORDER — CLOPIDOGREL BISULFATE 75 MG/1
75 TABLET ORAL
COMMUNITY
Start: 2018-06-04 | End: 2018-07-05 | Stop reason: SDUPTHER

## 2018-06-11 RX ORDER — ASPIRIN 81 MG/1
81 TABLET, CHEWABLE ORAL
COMMUNITY
Start: 2018-06-04 | End: 2018-07-05 | Stop reason: SDUPTHER

## 2018-06-11 RX ORDER — ATORVASTATIN CALCIUM 40 MG/1
40 TABLET, FILM COATED ORAL
COMMUNITY
Start: 2018-06-04 | End: 2018-07-05 | Stop reason: SDUPTHER

## 2018-06-11 ASSESSMENT — ENCOUNTER SYMPTOMS
SHORTNESS OF BREATH: 0
ABDOMINAL PAIN: 0
COUGH: 0
TROUBLE SWALLOWING: 0
NAUSEA: 0
CHEST TIGHTNESS: 0

## 2018-07-05 ENCOUNTER — TELEPHONE (OUTPATIENT)
Dept: FAMILY MEDICINE CLINIC | Age: 63
End: 2018-07-05

## 2018-07-05 RX ORDER — METOPROLOL SUCCINATE 25 MG/1
25 TABLET, EXTENDED RELEASE ORAL DAILY
Qty: 30 TABLET | Refills: 11 | Status: SHIPPED | OUTPATIENT
Start: 2018-07-05 | End: 2019-06-29 | Stop reason: SDUPTHER

## 2018-07-05 RX ORDER — CLOPIDOGREL BISULFATE 75 MG/1
75 TABLET ORAL DAILY
Qty: 30 TABLET | Refills: 11 | Status: SHIPPED | OUTPATIENT
Start: 2018-07-05 | End: 2019-06-29 | Stop reason: SDUPTHER

## 2018-07-05 RX ORDER — IRON POLYSACCHARIDE COMPLEX 150 MG
300 CAPSULE ORAL 2 TIMES DAILY
Qty: 60 CAPSULE | Refills: 11 | Status: SHIPPED | OUTPATIENT
Start: 2018-07-05 | End: 2018-07-06 | Stop reason: ALTCHOICE

## 2018-07-05 RX ORDER — ASPIRIN 81 MG/1
81 TABLET, CHEWABLE ORAL DAILY
Qty: 30 TABLET | Refills: 11 | Status: SHIPPED | OUTPATIENT
Start: 2018-07-05 | End: 2019-06-30 | Stop reason: SDUPTHER

## 2018-07-05 RX ORDER — ATORVASTATIN CALCIUM 40 MG/1
40 TABLET, FILM COATED ORAL DAILY
Qty: 30 TABLET | Refills: 11 | Status: SHIPPED | OUTPATIENT
Start: 2018-07-05 | End: 2019-06-29 | Stop reason: SDUPTHER

## 2018-07-05 NOTE — TELEPHONE ENCOUNTER
[address]  [address] called office stating they received an rx for Niferex 150mg 2 po BID but this does not match the quantity of #60 that was sent with it. She also says the recommended dose is only 1-2caps a day. Please clarify.

## 2018-07-05 NOTE — TELEPHONE ENCOUNTER
Autumn Amin called requesting a refill on the following medications:  Requested Prescriptions     Pending Prescriptions Disp Refills    clopidogrel (PLAVIX) 75 MG tablet 30 tablet      Sig: Take 1 tablet by mouth    metoprolol succinate (TOPROL XL) 25 MG extended release tablet 30 tablet     aspirin 81 MG chewable tablet 30 tablet      Sig: Take 1 tablet by mouth    atorvastatin (LIPITOR) 40 MG tablet 30 tablet      Sig: Take 1 tablet by mouth    iron polysaccharides (NIFEREX) 150 MG capsule 60 capsule      Sig: Take 2 capsules by mouth     Pharmacy verified: [facility] on [address]  . [address]v    PATIENT OUT OF MEDS. WILL SEE CARDIOLOGIST 7/10/18.   Date of last visit: 6/11/18  Date of next visit (if applicable): 3/39/7698

## 2018-07-06 RX ORDER — FERROUS SULFATE 325(65) MG
325 TABLET ORAL 2 TIMES DAILY
Qty: 60 TABLET | Refills: 11 | Status: SHIPPED | OUTPATIENT
Start: 2018-07-06 | End: 2018-09-07

## 2018-07-10 ENCOUNTER — OFFICE VISIT (OUTPATIENT)
Dept: CARDIOLOGY CLINIC | Age: 63
End: 2018-07-10
Payer: COMMERCIAL

## 2018-07-10 VITALS
WEIGHT: 178.8 LBS | DIASTOLIC BLOOD PRESSURE: 58 MMHG | BODY MASS INDEX: 25.6 KG/M2 | SYSTOLIC BLOOD PRESSURE: 104 MMHG | HEART RATE: 78 BPM | HEIGHT: 70 IN

## 2018-07-10 DIAGNOSIS — I25.10 CORONARY ARTERY DISEASE INVOLVING NATIVE CORONARY ARTERY OF NATIVE HEART WITHOUT ANGINA PECTORIS: Primary | ICD-10-CM

## 2018-07-10 DIAGNOSIS — I10 ESSENTIAL HYPERTENSION: ICD-10-CM

## 2018-07-10 DIAGNOSIS — E78.2 MIXED DYSLIPIDEMIA: ICD-10-CM

## 2018-07-10 DIAGNOSIS — Z95.820 S/P ANGIOPLASTY WITH STENT: ICD-10-CM

## 2018-07-10 PROCEDURE — 99204 OFFICE O/P NEW MOD 45 MIN: CPT | Performed by: INTERNAL MEDICINE

## 2018-07-10 PROCEDURE — 93000 ELECTROCARDIOGRAM COMPLETE: CPT | Performed by: INTERNAL MEDICINE

## 2018-07-10 NOTE — PROGRESS NOTES
anticipation of possible hemodynamic support  · The left main-LCx lesion was pre-dilated (BALL MEDTRONIC [id number]) and   treated with BMSx1 ( BMS INTEGRITY 3.50 X 18). After deployment there was   no residual stenosis, no evidence of dissection and ASHA III flow   distally. Stent was then post dilated (BALL 3.5 X 12 RX NC EUPHO)    Recommendations  · Aspirin indefinitely. Plavix for at least 1 month given, preferably   longer given NSTEMI  · Transfer to Heart 3 team for further care    Echo 06/04/2018    Indication:  S/P NSTEMI, preop CABG        Findings   L Ventricle: The left ventricle is enlarged. There is no left ventricular  hypertrophy. The left ventricular septal wall appears aneurysmal.  There  is diffuse global hypokinesis. The estimated ejection fraction is  55-60%, consistent with normal systolic function. The left ventricular  biplane EF is 55%. EF by 3D echo was 55%. There is an E to A reversal in  the mitral valve flow pattern suggestive of diastolic dysfunction. The   basal anterolateral, and  mid anterolateral wall segments are  hypokinetic (score 2). Overall wallmotion score index is  1.13 The  global longitudinal strain was -16.8%. EKG 7/10/18  Sinus  Rhythm   WITHIN NORMAL LIMITS      Assessment   Diagnosis Orders   1. Coronary artery disease involving native coronary artery of native heart without angina pectoris     2. S/P angioplasty with stent of the LM to LCX with 100% lad lesion and mod rca lesion- med RX 06/04/2018     3. Essential hypertension     4. Mixed dyslipidemia         Plan   Continue the current treatment and with constant vigilance to changes in symptoms and also any potential side effects. Return for care or seek medical attention immediately if symptoms got worse and/or develop new symptoms. Coronary artery disease, seems to be stable.  Denies angina or change in breathing pattern  Cont asa and plavix  Cont statins and BB  Low salt diet     Hyperlipidemia: on statins,

## 2018-09-07 ENCOUNTER — OFFICE VISIT (OUTPATIENT)
Dept: FAMILY MEDICINE CLINIC | Age: 63
End: 2018-09-07
Payer: COMMERCIAL

## 2018-09-07 VITALS
TEMPERATURE: 97.6 F | HEIGHT: 70 IN | BODY MASS INDEX: 26.54 KG/M2 | RESPIRATION RATE: 16 BRPM | SYSTOLIC BLOOD PRESSURE: 130 MMHG | HEART RATE: 64 BPM | DIASTOLIC BLOOD PRESSURE: 78 MMHG | WEIGHT: 185.4 LBS

## 2018-09-07 DIAGNOSIS — R36.1 HEMATOSPERMIA: Primary | ICD-10-CM

## 2018-09-07 PROCEDURE — 99213 OFFICE O/P EST LOW 20 MIN: CPT | Performed by: NURSE PRACTITIONER

## 2018-09-10 ENCOUNTER — TELEPHONE (OUTPATIENT)
Dept: FAMILY MEDICINE CLINIC | Age: 63
End: 2018-09-10

## 2018-09-10 DIAGNOSIS — I25.10 CORONARY ARTERY DISEASE INVOLVING NATIVE CORONARY ARTERY OF NATIVE HEART WITHOUT ANGINA PECTORIS: Primary | ICD-10-CM

## 2018-09-10 RX ORDER — NITROGLYCERIN 0.4 MG/1
TABLET SUBLINGUAL
Qty: 25 TABLET | Refills: 3 | Status: SHIPPED | OUTPATIENT
Start: 2018-09-10 | End: 2019-03-25 | Stop reason: SDUPTHER

## 2018-09-13 ENCOUNTER — HOSPITAL ENCOUNTER (OUTPATIENT)
Age: 63
Discharge: HOME | End: 2018-09-13
Payer: COMMERCIAL

## 2018-09-13 DIAGNOSIS — R36.1 HEMATOSPERMIA: ICD-10-CM

## 2018-09-13 LAB
BACTERIA: NORMAL
BILIRUBIN URINE: NEGATIVE
BLOOD, URINE: NEGATIVE
CASTS: NORMAL /LPF
CASTS: NORMAL /LPF
CHARACTER, URINE: CLEAR
COLOR: YELLOW
CRYSTALS: NORMAL
EPITHELIAL CELLS, UA: NORMAL /HPF
GLUCOSE, URINE: NEGATIVE MG/DL
KETONES, URINE: NEGATIVE
LEUKOCYTE ESTERASE, URINE: NEGATIVE
MISCELLANEOUS LAB TEST RESULT: NORMAL
NITRITE, URINE: NEGATIVE
PH UA: 5
PROSTATE SPECIFIC ANTIGEN: 0.72 NG/ML (ref 0–1)
PROTEIN UA: NEGATIVE MG/DL
RBC URINE: NORMAL /HPF
RENAL EPITHELIAL, UA: NORMAL
SPECIFIC GRAVITY UA: 1.01 (ref 1–1.03)
UROBILINOGEN, URINE: 0.2 EU/DL
WBC UA: NORMAL /HPF
YEAST: NORMAL

## 2018-09-13 PROCEDURE — G0103 PSA SCREENING: HCPCS

## 2018-09-13 PROCEDURE — 81001 URINALYSIS AUTO W/SCOPE: CPT

## 2018-09-13 PROCEDURE — 36415 COLL VENOUS BLD VENIPUNCTURE: CPT

## 2018-09-17 ENCOUNTER — TELEPHONE (OUTPATIENT)
Dept: FAMILY MEDICINE CLINIC | Age: 63
End: 2018-09-17

## 2018-09-19 NOTE — TELEPHONE ENCOUNTER
Did a follow up call with pt, he has not had a chance to check insurance yet. When pt finds out coverage and if he chooses to go through with the referral he will give the office a call back.

## 2018-09-28 ENCOUNTER — TELEPHONE (OUTPATIENT)
Dept: UROLOGY | Age: 63
End: 2018-09-28

## 2018-09-28 DIAGNOSIS — R36.1 HEMATOSPERMIA: Primary | ICD-10-CM

## 2018-09-30 ENCOUNTER — HOSPITAL ENCOUNTER (EMERGENCY)
Age: 63
Discharge: HOME | End: 2018-09-30
Payer: COMMERCIAL

## 2018-09-30 ENCOUNTER — APPOINTMENT (OUTPATIENT)
Dept: CT IMAGING | Age: 63
End: 2018-09-30
Payer: COMMERCIAL

## 2018-09-30 ENCOUNTER — APPOINTMENT (OUTPATIENT)
Dept: GENERAL RADIOLOGY | Age: 63
End: 2018-09-30
Payer: COMMERCIAL

## 2018-09-30 VITALS
HEART RATE: 62 BPM | OXYGEN SATURATION: 97 % | WEIGHT: 185 LBS | TEMPERATURE: 97.8 F | RESPIRATION RATE: 15 BRPM | SYSTOLIC BLOOD PRESSURE: 149 MMHG | BODY MASS INDEX: 27.4 KG/M2 | DIASTOLIC BLOOD PRESSURE: 85 MMHG | HEIGHT: 69 IN

## 2018-09-30 DIAGNOSIS — G44.209 ACUTE NON INTRACTABLE TENSION-TYPE HEADACHE: Primary | ICD-10-CM

## 2018-09-30 DIAGNOSIS — R79.89 ABNORMAL THYROID BLOOD TEST: ICD-10-CM

## 2018-09-30 LAB
ALBUMIN SERPL-MCNC: 4.3 G/DL (ref 3.5–5.1)
ALP BLD-CCNC: 117 U/L (ref 38–126)
ALT SERPL-CCNC: 22 U/L (ref 11–66)
AMPHETAMINE+METHAMPHETAMINE URINE SCREEN: NEGATIVE
AMYLASE: 46 U/L (ref 20–104)
ANION GAP SERPL CALCULATED.3IONS-SCNC: 13 MEQ/L (ref 8–16)
AST SERPL-CCNC: 16 U/L (ref 5–40)
BACTERIA: ABNORMAL /HPF
BARBITURATE QUANTITATIVE URINE: NEGATIVE
BASOPHILS # BLD: 0.3 %
BASOPHILS ABSOLUTE: 0 THOU/MM3 (ref 0–0.1)
BENZODIAZEPINE QUANTITATIVE URINE: NEGATIVE
BILIRUB SERPL-MCNC: 0.8 MG/DL (ref 0.3–1.2)
BILIRUBIN DIRECT: < 0.2 MG/DL (ref 0–0.3)
BILIRUBIN URINE: NEGATIVE
BLOOD, URINE: NEGATIVE
BUN BLDV-MCNC: 15 MG/DL (ref 7–22)
CALCIUM SERPL-MCNC: 9.1 MG/DL (ref 8.5–10.5)
CANNABINOID QUANTITATIVE URINE: NEGATIVE
CASTS 2: ABNORMAL /LPF
CASTS UA: ABNORMAL /LPF
CHARACTER, URINE: CLEAR
CHLORIDE BLD-SCNC: 105 MEQ/L (ref 98–111)
CO2: 21 MEQ/L (ref 23–33)
COCAINE METABOLITE QUANTITATIVE URINE: NEGATIVE
COLOR: YELLOW
CREAT SERPL-MCNC: 1 MG/DL (ref 0.4–1.2)
CRYSTALS, UA: ABNORMAL
EKG ATRIAL RATE: 56 BPM
EKG P AXIS: 62 DEGREES
EKG P-R INTERVAL: 206 MS
EKG Q-T INTERVAL: 452 MS
EKG QRS DURATION: 90 MS
EKG QTC CALCULATION (BAZETT): 436 MS
EKG R AXIS: 56 DEGREES
EKG T AXIS: 85 DEGREES
EKG VENTRICULAR RATE: 56 BPM
EOSINOPHIL # BLD: 0.8 %
EOSINOPHILS ABSOLUTE: 0.1 THOU/MM3 (ref 0–0.4)
EPITHELIAL CELLS, UA: ABNORMAL /HPF
ERYTHROCYTE [DISTWIDTH] IN BLOOD BY AUTOMATED COUNT: 14 % (ref 11.5–14.5)
ERYTHROCYTE [DISTWIDTH] IN BLOOD BY AUTOMATED COUNT: 42.5 FL (ref 35–45)
GFR SERPL CREATININE-BSD FRML MDRD: 75 ML/MIN/1.73M2
GLUCOSE BLD-MCNC: 121 MG/DL (ref 70–108)
GLUCOSE URINE: 100 MG/DL
HCT VFR BLD CALC: 44.7 % (ref 42–52)
HEMOGLOBIN: 15 GM/DL (ref 14–18)
IMMATURE GRANS (ABS): 0.03 THOU/MM3 (ref 0–0.07)
IMMATURE GRANULOCYTES: 0.3 %
KETONES, URINE: NEGATIVE
LEUKOCYTE ESTERASE, URINE: NEGATIVE
LIPASE: 20.5 U/L (ref 5.6–51.3)
LYMPHOCYTES # BLD: 9.2 %
LYMPHOCYTES ABSOLUTE: 0.8 THOU/MM3 (ref 1–4.8)
MCH RBC QN AUTO: 28.1 PG (ref 26–33)
MCHC RBC AUTO-ENTMCNC: 33.6 GM/DL (ref 32.2–35.5)
MCV RBC AUTO: 83.7 FL (ref 80–94)
MISCELLANEOUS 2: ABNORMAL
MONOCYTES # BLD: 3.6 %
MONOCYTES ABSOLUTE: 0.3 THOU/MM3 (ref 0.4–1.3)
NITRITE, URINE: NEGATIVE
NUCLEATED RED BLOOD CELLS: 0 /100 WBC
OPIATES, URINE: NEGATIVE
OSMOLALITY CALCULATION: 279.6 MOSMOL/KG (ref 275–300)
OXYCODONE: NEGATIVE
PH UA: 7
PHENCYCLIDINE QUANTITATIVE URINE: NEGATIVE
PLATELET # BLD: 256 THOU/MM3 (ref 130–400)
PMV BLD AUTO: 9.8 FL (ref 9.4–12.4)
POTASSIUM REFLEX MAGNESIUM: 3.8 MEQ/L (ref 3.5–5.2)
PROTEIN UA: NEGATIVE
RBC # BLD: 5.34 MILL/MM3 (ref 4.7–6.1)
RBC URINE: ABNORMAL /HPF
RENAL EPITHELIAL, UA: ABNORMAL
SEG NEUTROPHILS: 85.8 %
SEGMENTED NEUTROPHILS ABSOLUTE COUNT: 7.4 THOU/MM3 (ref 1.8–7.7)
SODIUM BLD-SCNC: 139 MEQ/L (ref 135–145)
SPECIFIC GRAVITY, URINE: 1.02 (ref 1–1.03)
T4 FREE: 1.9 NG/DL (ref 0.93–1.76)
TOTAL PROTEIN: 7 G/DL (ref 6.1–8)
TROPONIN T: < 0.01 NG/ML
TSH SERPL DL<=0.05 MIU/L-ACNC: 0.36 UIU/ML (ref 0.4–4.2)
UROBILINOGEN, URINE: 0.2 EU/DL
WBC # BLD: 8.6 THOU/MM3 (ref 4.8–10.8)
WBC UA: ABNORMAL /HPF
YEAST: ABNORMAL

## 2018-09-30 PROCEDURE — 80076 HEPATIC FUNCTION PANEL: CPT

## 2018-09-30 PROCEDURE — 99284 EMERGENCY DEPT VISIT MOD MDM: CPT

## 2018-09-30 PROCEDURE — 71046 X-RAY EXAM CHEST 2 VIEWS: CPT

## 2018-09-30 PROCEDURE — 84443 ASSAY THYROID STIM HORMONE: CPT

## 2018-09-30 PROCEDURE — 85025 COMPLETE CBC W/AUTO DIFF WBC: CPT

## 2018-09-30 PROCEDURE — 70450 CT HEAD/BRAIN W/O DYE: CPT

## 2018-09-30 PROCEDURE — 82150 ASSAY OF AMYLASE: CPT

## 2018-09-30 PROCEDURE — 6360000002 HC RX W HCPCS: Performed by: PHYSICIAN ASSISTANT

## 2018-09-30 PROCEDURE — 93010 ELECTROCARDIOGRAM REPORT: CPT | Performed by: INTERNAL MEDICINE

## 2018-09-30 PROCEDURE — 96374 THER/PROPH/DIAG INJ IV PUSH: CPT

## 2018-09-30 PROCEDURE — 83690 ASSAY OF LIPASE: CPT

## 2018-09-30 PROCEDURE — 81001 URINALYSIS AUTO W/SCOPE: CPT

## 2018-09-30 PROCEDURE — 80053 COMPREHEN METABOLIC PANEL: CPT

## 2018-09-30 PROCEDURE — 80307 DRUG TEST PRSMV CHEM ANLYZR: CPT

## 2018-09-30 PROCEDURE — 93005 ELECTROCARDIOGRAM TRACING: CPT | Performed by: PHYSICIAN ASSISTANT

## 2018-09-30 PROCEDURE — 36415 COLL VENOUS BLD VENIPUNCTURE: CPT

## 2018-09-30 PROCEDURE — 96375 TX/PRO/DX INJ NEW DRUG ADDON: CPT

## 2018-09-30 PROCEDURE — 84439 ASSAY OF FREE THYROXINE: CPT

## 2018-09-30 PROCEDURE — 84484 ASSAY OF TROPONIN QUANT: CPT

## 2018-09-30 RX ORDER — METOCLOPRAMIDE HYDROCHLORIDE 5 MG/ML
10 INJECTION INTRAMUSCULAR; INTRAVENOUS ONCE
Status: COMPLETED | OUTPATIENT
Start: 2018-09-30 | End: 2018-09-30

## 2018-09-30 RX ORDER — BUTALBITAL, ACETAMINOPHEN AND CAFFEINE 50; 325; 40 MG/1; MG/1; MG/1
1 TABLET ORAL EVERY 4 HOURS PRN
Qty: 30 TABLET | Refills: 0 | Status: SHIPPED | OUTPATIENT
Start: 2018-09-30 | End: 2020-01-20

## 2018-09-30 RX ORDER — ONDANSETRON 4 MG/1
4 TABLET, ORALLY DISINTEGRATING ORAL EVERY 8 HOURS PRN
Qty: 20 TABLET | Refills: 0 | Status: SHIPPED | OUTPATIENT
Start: 2018-09-30 | End: 2019-04-09 | Stop reason: ALTCHOICE

## 2018-09-30 RX ORDER — DIPHENHYDRAMINE HYDROCHLORIDE 50 MG/ML
25 INJECTION INTRAMUSCULAR; INTRAVENOUS ONCE
Status: COMPLETED | OUTPATIENT
Start: 2018-09-30 | End: 2018-09-30

## 2018-09-30 RX ADMIN — METOCLOPRAMIDE 10 MG: 5 INJECTION, SOLUTION INTRAMUSCULAR; INTRAVENOUS at 21:38

## 2018-09-30 RX ADMIN — DIPHENHYDRAMINE HYDROCHLORIDE 25 MG: 50 INJECTION, SOLUTION INTRAMUSCULAR; INTRAVENOUS at 21:38

## 2018-09-30 ASSESSMENT — ENCOUNTER SYMPTOMS
VOMITING: 1
EYE PAIN: 1
BLOOD IN STOOL: 0
PHOTOPHOBIA: 0
SORE THROAT: 0
DIARRHEA: 0
SHORTNESS OF BREATH: 0
BACK PAIN: 0
WHEEZING: 0
VOICE CHANGE: 0
CHEST TIGHTNESS: 0
NAUSEA: 1
RHINORRHEA: 0
COUGH: 0
CONSTIPATION: 0
ABDOMINAL PAIN: 0

## 2018-10-01 NOTE — ED PROVIDER NOTES
pain, palpitations and leg swelling. Gastrointestinal: Positive for nausea and vomiting. Negative for abdominal pain, blood in stool, constipation and diarrhea. Genitourinary: Negative for difficulty urinating, dysuria and hematuria. Musculoskeletal: Negative for back pain, gait problem, joint swelling, neck pain and neck stiffness. Skin: Negative for pallor and rash. Neurological: Positive for headaches. Negative for dizziness, syncope, speech difficulty, weakness, light-headedness and numbness. Hematological: Negative for adenopathy. Psychiatric/Behavioral: Negative for confusion and suicidal ideas. The patient is not nervous/anxious. PAST MEDICAL HISTORY    has a past medical history of Arthritis; Coronary artery disease involving native coronary artery of native heart without angina pectoris; Hyperlipidemia; Hypertension; and Liver disease. SURGICAL HISTORY      has a past surgical history that includes Carpal tunnel release; Tonsillectomy and adenoidectomy; hernia repair; Cholecystectomy; Total knee arthroplasty (Left); and Thyroidectomy. CURRENT MEDICATIONS       Previous Medications    ASPIRIN 81 MG CHEWABLE TABLET    Take 1 tablet by mouth daily    ATORVASTATIN (LIPITOR) 40 MG TABLET    Take 1 tablet by mouth daily    CLOPIDOGREL (PLAVIX) 75 MG TABLET    Take 1 tablet by mouth daily    LEVOTHYROXINE (SYNTHROID) 125 MCG TABLET        METOPROLOL SUCCINATE (TOPROL XL) 25 MG EXTENDED RELEASE TABLET    Take 1 tablet by mouth daily    NITROGLYCERIN (NITROSTAT) 0.4 MG SL TABLET    Take 1 tablet at onset of chest pain, if no relief in 5 min may repeat x 2 then call 911       ALLERGIES     has No Known Allergies. FAMILY HISTORY     indicated that his mother is . He indicated that his father is . He indicated that his brother is alive.     family history includes Arthritis in his father and mother; Cancer in his brother; Heart Disease in his father and mother; High Blood electronically signed by Dr. Lior Hawkins on 9/30/2018 9:17 PM          LABS:     Labs Reviewed   CBC WITH AUTO DIFFERENTIAL - Abnormal; Notable for the following:        Result Value    Lymphocytes # 0.8 (*)     Monocytes # 0.3 (*)     All other components within normal limits   COMPREHENSIVE METABOLIC PANEL W/ REFLEX TO MG FOR LOW K - Abnormal; Notable for the following:     Glucose 121 (*)     CO2 21 (*)     All other components within normal limits   TSH WITH REFLEX - Abnormal; Notable for the following:     TSH 0.365 (*)     All other components within normal limits   GLOMERULAR FILTRATION RATE, ESTIMATED - Abnormal; Notable for the following:     Est, Glom Filt Rate 75 (*)     All other components within normal limits   T4, FREE - Abnormal; Notable for the following:     T4 Free 1.90 (*)     All other components within normal limits   URINE WITH REFLEXED MICRO - Abnormal; Notable for the following:     Glucose, Ur 100 (*)     All other components within normal limits   LIPASE   AMYLASE   HEPATIC FUNCTION PANEL   TROPONIN   ANION GAP   OSMOLALITY   URINE DRUG SCREEN       EMERGENCY DEPARTMENT COURSE:   Vitals:    Vitals:    09/30/18 2027 09/30/18 2137   BP: (!) 137/94 (!) 149/85   Pulse: 69 62   Resp: 16 15   Temp: 97.8 °F (36.6 °C)    TempSrc: Oral    SpO2: 97% 97%   Weight: 185 lb (83.9 kg)    Height: 5' 9\" (1.753 m)        8:57 PM: The patient was seen and evaluated. MDM:  The patient was seen within the ED today for the evaluation of fatigue, a headache, and emesis. The patient arrived in no acute distress and in stable condition. On exam, I appreciated that the patient was neurologically intact, normal heart and lung sounds, no maxillary or frontal sinus tenderness, no abdominal tenderness, right intraocular pressures of 13 and 7, and left intraocular pressures of 6 and 7. Radiological studies within the department revealed no acute findings. Laboratory work was reassuring, though his T4 was 1.90.

## 2018-10-15 ENCOUNTER — TELEPHONE (OUTPATIENT)
Dept: FAMILY MEDICINE CLINIC | Age: 63
End: 2018-10-15

## 2018-10-15 NOTE — TELEPHONE ENCOUNTER
There will be minimal affect to his thyroid levels since this was 1 time thing. Reassurance nothing additional needs done. Continue to consistently take on empty stomach.

## 2018-10-15 NOTE — TELEPHONE ENCOUNTER
Pt called office stating he works 3rd shift, came home and went to bed. He just got up, took one bite of food and remembered he never took his Synthroid. He stopped eating and took his medication. He wants to know what to do from here since he ate something right before. Please advise.

## 2018-11-12 ENCOUNTER — OFFICE VISIT (OUTPATIENT)
Dept: CARDIOLOGY CLINIC | Age: 63
End: 2018-11-12
Payer: COMMERCIAL

## 2018-11-12 VITALS
SYSTOLIC BLOOD PRESSURE: 113 MMHG | HEART RATE: 72 BPM | BODY MASS INDEX: 26.81 KG/M2 | DIASTOLIC BLOOD PRESSURE: 69 MMHG | HEIGHT: 69 IN | WEIGHT: 181 LBS

## 2018-11-12 DIAGNOSIS — I25.10 CORONARY ARTERY DISEASE INVOLVING NATIVE CORONARY ARTERY OF NATIVE HEART WITHOUT ANGINA PECTORIS: Primary | ICD-10-CM

## 2018-11-12 DIAGNOSIS — E78.2 MIXED DYSLIPIDEMIA: ICD-10-CM

## 2018-11-12 DIAGNOSIS — I95.1 ORTHOSTATIC HYPOTENSION: ICD-10-CM

## 2018-11-12 DIAGNOSIS — Z95.820 S/P ANGIOPLASTY WITH STENT: ICD-10-CM

## 2018-11-12 PROBLEM — I10 BENIGN ESSENTIAL HTN: Status: RESOLVED | Noted: 2018-03-16 | Resolved: 2018-11-12

## 2018-11-12 PROCEDURE — 99214 OFFICE O/P EST MOD 30 MIN: CPT | Performed by: INTERNAL MEDICINE

## 2018-11-12 NOTE — PROGRESS NOTES
elevated at 15 mmHg; No LV-AO gradient on pull back    Intervention summary:  · Complex PCI of left main-LCx using OTW. Femoral access was obtained at   beginning of procedure in anticipation of possible hemodynamic support  · The left main-LCx lesion was pre-dilated (BALL MEDTRONIC [id number]) and   treated with BMSx1 ( BMS INTEGRITY 3.50 X 18). After deployment there was   no residual stenosis, no evidence of dissection and ASHA III flow   distally. Stent was then post dilated (BALL 3.5 X 12 RX NC EUPHO)    Recommendations  · Aspirin indefinitely. Plavix for at least 1 month given, preferably   longer given NSTEMI  · Transfer to Heart 3 team for further care    Echo 06/04/2018    Indication:  S/P NSTEMI, preop CABG        Findings   L Ventricle: The left ventricle is enlarged. There is no left ventricular  hypertrophy. The left ventricular septal wall appears aneurysmal.  There  is diffuse global hypokinesis. The estimated ejection fraction is  55-60%, consistent with normal systolic function. The left ventricular  biplane EF is 55%. EF by 3D echo was 55%. There is an E to A reversal in  the mitral valve flow pattern suggestive of diastolic dysfunction. The   basal anterolateral, and  mid anterolateral wall segments are  hypokinetic (score 2). Overall wallmotion score index is  1.13 The  global longitudinal strain was -16.8%. EKG 7/10/18  Sinus  Rhythm   WITHIN NORMAL LIMITS      Assessment   Diagnosis Orders   1. Coronary artery disease involving native coronary artery of native heart without angina pectoris     2. Mixed dyslipidemia     3. Orthostatic hypotension     4. S/P angioplasty with stent of the LM to LCX with 100% lad lesion and mod rca lesion- med RX 06/04/2018         Plan   The meds and labs reviewed    Continue the current treatment and with constant vigilance to changes in symptoms and also any potential side effects.   Return for care or seek medical attention immediately if symptoms got worse

## 2018-11-14 ENCOUNTER — OFFICE VISIT (OUTPATIENT)
Dept: UROLOGY | Age: 63
End: 2018-11-14
Payer: COMMERCIAL

## 2018-11-14 VITALS
DIASTOLIC BLOOD PRESSURE: 70 MMHG | WEIGHT: 181 LBS | BODY MASS INDEX: 26.81 KG/M2 | SYSTOLIC BLOOD PRESSURE: 122 MMHG | HEIGHT: 69 IN

## 2018-11-14 DIAGNOSIS — R36.1 HEMATOSPERMIA: Primary | ICD-10-CM

## 2018-11-14 LAB
BILIRUBIN URINE: NEGATIVE
BLOOD URINE, POC: NORMAL
CHARACTER, URINE: CLEAR
COLOR, URINE: YELLOW
GLUCOSE URINE: NEGATIVE MG/DL
KETONES, URINE: NEGATIVE
LEUKOCYTE CLUMPS, URINE: NEGATIVE
NITRITE, URINE: NEGATIVE
PH, URINE: 5.5
PROTEIN, URINE: NEGATIVE MG/DL
SPECIFIC GRAVITY, URINE: 1.01 (ref 1–1.03)
UROBILINOGEN, URINE: 0.2 EU/DL

## 2018-11-14 PROCEDURE — 99204 OFFICE O/P NEW MOD 45 MIN: CPT | Performed by: NURSE PRACTITIONER

## 2018-11-14 PROCEDURE — 81003 URINALYSIS AUTO W/O SCOPE: CPT | Performed by: NURSE PRACTITIONER

## 2018-11-14 RX ORDER — CIPROFLOXACIN 500 MG/1
500 TABLET, FILM COATED ORAL 2 TIMES DAILY
Qty: 28 TABLET | Refills: 0 | Status: SHIPPED | OUTPATIENT
Start: 2018-11-14 | End: 2018-11-27 | Stop reason: ALTCHOICE

## 2018-11-27 ENCOUNTER — TELEPHONE (OUTPATIENT)
Dept: UROLOGY | Age: 63
End: 2018-11-27

## 2018-11-27 RX ORDER — FINASTERIDE 5 MG/1
5 TABLET, FILM COATED ORAL DAILY
Qty: 30 TABLET | Refills: 3 | Status: SHIPPED | OUTPATIENT
Start: 2018-11-27 | End: 2020-06-02 | Stop reason: ALTCHOICE

## 2019-03-25 DIAGNOSIS — I25.10 CORONARY ARTERY DISEASE INVOLVING NATIVE CORONARY ARTERY OF NATIVE HEART WITHOUT ANGINA PECTORIS: ICD-10-CM

## 2019-03-25 RX ORDER — NITROGLYCERIN 0.4 MG/1
TABLET SUBLINGUAL
Qty: 25 TABLET | Refills: 3 | Status: SHIPPED | OUTPATIENT
Start: 2019-03-25 | End: 2021-04-19

## 2019-04-09 ENCOUNTER — OFFICE VISIT (OUTPATIENT)
Dept: CARDIOLOGY CLINIC | Age: 64
End: 2019-04-09
Payer: COMMERCIAL

## 2019-04-09 VITALS
DIASTOLIC BLOOD PRESSURE: 85 MMHG | BODY MASS INDEX: 27.11 KG/M2 | HEIGHT: 69 IN | HEART RATE: 60 BPM | SYSTOLIC BLOOD PRESSURE: 133 MMHG | WEIGHT: 183 LBS

## 2019-04-09 DIAGNOSIS — I25.10 CORONARY ARTERY DISEASE INVOLVING NATIVE CORONARY ARTERY OF NATIVE HEART WITHOUT ANGINA PECTORIS: Primary | ICD-10-CM

## 2019-04-09 DIAGNOSIS — Z95.820 S/P ANGIOPLASTY WITH STENT: ICD-10-CM

## 2019-04-09 DIAGNOSIS — I95.1 ORTHOSTATIC HYPOTENSION: ICD-10-CM

## 2019-04-09 DIAGNOSIS — E78.2 MIXED DYSLIPIDEMIA: ICD-10-CM

## 2019-04-09 PROCEDURE — 99214 OFFICE O/P EST MOD 30 MIN: CPT | Performed by: INTERNAL MEDICINE

## 2019-04-09 NOTE — PROGRESS NOTES
Chief Complaint   Patient presents with   Pushpa Bazan Other     Originally  patient here - ref by Dr. Christian Pollack - NSTEMI  referred from [address] [place] [address] post Thyriod surgery  He sufferred NSTEMI post op in [address] [place] [address]      Pt wants to discuss if he is able to lift 50lb boxes at his new job    No dizziness    Denied cp, sob, dizziness, swelling or palpitations    Reviewed the record from [address] [place] [address]      Patient Active Problem List   Diagnosis    Tinnitus    Hearing loss d/t noise    Impaired fasting blood sugar    Primary osteoarthritis of left knee    Liver disease    Syncope and collapse    Near syncope    Mixed dyslipidemia    Intermittent chest pain    Postural dizziness with presyncope    Orthostatic hypotension    S/P angioplasty with stent of the LM to LCX with 100% lad lesion and mod rca lesion- med RX 06/04/2018    Coronary artery disease involving native coronary artery of native heart without angina pectoris       Past Surgical History:   Procedure Laterality Date    CARPAL TUNNEL RELEASE      CHOLECYSTECTOMY      HERNIA REPAIR      THYROIDECTOMY      TONSILLECTOMY AND ADENOIDECTOMY      TOTAL KNEE ARTHROPLASTY Left        No Known Allergies     Family History   Problem Relation Age of Onset    Heart Disease Mother     High Blood Pressure Mother     High Cholesterol Mother     Arthritis Mother     Miscarriages / Stillbirths Mother     Heart Disease Father     Arthritis Father     Cancer Brother         melanoma        Social History     Socioeconomic History    Marital status:      Spouse name: Not on file    Number of children: Not on file    Years of education: Not on file    Highest education level: Not on file   Occupational History    Not on file   Social Needs    Financial resource strain: Not on file    Food insecurity:     Worry: Not on file     Inability: Not on file    Transportation needs:     Medical: Not on file     Non-medical: Not on file   Tobacco Use    Smoking status: Never Smoker    Smokeless tobacco: Never Used   Substance and Sexual Activity    Alcohol use: No    Drug use: No    Sexual activity: Yes     Partners: Female   Lifestyle    Physical activity:     Days per week: Not on file     Minutes per session: Not on file    Stress: Not on file   Relationships    Social connections:     Talks on phone: Not on file     Gets together: Not on file     Attends Alevism service: Not on file     Active member of club or organization: Not on file     Attends meetings of clubs or organizations: Not on file     Relationship status: Not on file    Intimate partner violence:     Fear of current or ex partner: Not on file     Emotionally abused: Not on file     Physically abused: Not on file     Forced sexual activity: Not on file   Other Topics Concern    Not on file   Social History Narrative    Not on file       Current Outpatient Medications   Medication Sig Dispense Refill    nitroGLYCERIN (NITROSTAT) 0.4 MG SL tablet Take 1 tablet at onset of chest pain, if no relief in 5 min may repeat x 2 then call 911 25 tablet 3    finasteride (PROSCAR) 5 MG tablet Take 1 tablet by mouth daily 30 tablet 3    butalbital-acetaminophen-caffeine (FIORICET, ESGIC) -40 MG per tablet Take 1 tablet by mouth every 4 hours as needed for Headaches 30 tablet 0    clopidogrel (PLAVIX) 75 MG tablet Take 1 tablet by mouth daily 30 tablet 11    metoprolol succinate (TOPROL XL) 25 MG extended release tablet Take 1 tablet by mouth daily 30 tablet 11    aspirin 81 MG chewable tablet Take 1 tablet by mouth daily 30 tablet 11    atorvastatin (LIPITOR) 40 MG tablet Take 1 tablet by mouth daily 30 tablet 11    levothyroxine (SYNTHROID) 125 MCG tablet        No current facility-administered medications for this visit. Review of Systems -     General ROS: negative  Psychological ROS: negative  Hematological and Lymphatic ROS: No history of blood clots or bleeding disorder.    Respiratory ROS: no cough, shortness of breath, or wheezing  Cardiovascular ROS: no chest pain or dyspnea on exertion  Gastrointestinal ROS: negative  Genito-Urinary ROS: no dysuria, trouble voiding, or hematuria  Musculoskeletal ROS: negative  Neurological ROS: no TIA or stroke symptoms  Dermatological ROS: negative      Blood pressure (!) 140/84, pulse 60, height 5' 9\" (1.753 m), weight 183 lb (83 kg). Physical Examination:    General appearance - alert, well appearing, and in no distress  Mental status - alert, oriented to person, place, and time  Neck - supple, no significant adenopathy, no JVD, or carotid bruits  Chest - clear to auscultation, no wheezes, rales or rhonchi, symmetric air entry  Heart - normal rate, regular rhythm, normal S1, S2, no murmurs, rubs, clicks or gallops  Abdomen - soft, nontender, nondistended, no masses or organomegaly  Neurological - alert, oriented, normal speech, no focal findings or movement disorder noted  Musculoskeletal - no joint tenderness, deformity or swelling  Extremities - peripheral pulses normal, no pedal edema, no clubbing or cyanosis  Skin - normal coloration and turgor, no rashes, no suspicious skin lesions noted    Lab  No results for input(s): CKTOTAL, CKMB, CKMBINDEX, TROPONINI in the last 72 hours.   CBC:   Lab Results   Component Value Date    WBC 8.6 09/30/2018    RBC 5.34 09/30/2018    HGB 15.0 09/30/2018    HCT 44.7 09/30/2018    MCV 83.7 09/30/2018    MCH 28.1 09/30/2018    MCHC 33.6 09/30/2018    RDW 15.6 03/17/2018     09/30/2018    MPV 9.8 09/30/2018     BMP:    Lab Results   Component Value Date     09/30/2018    K 3.8 09/30/2018     09/30/2018    CO2 21 09/30/2018    BUN 15 09/30/2018    LABALBU 4.3 09/30/2018    CREATININE 1.0 09/30/2018    CALCIUM 9.1 09/30/2018    LABGLOM 75 09/30/2018    GLUCOSE 121 09/30/2018     Hepatic Function Panel:    Lab Results   Component Value Date    ALKPHOS 117 09/30/2018    ALT 22 09/30/2018    AST 16 09/30/2018    PROT 7.0 09/30/2018    BILITOT 0.8 09/30/2018    BILIDIR <0.2 09/30/2018    LABALBU 4.3 09/30/2018     Magnesium:    Lab Results   Component Value Date    MG 2.2 03/17/2018     Warfarin PT/INR:  No components found for: Danny Duval  HgBA1c:    Lab Results   Component Value Date    LABA1C 5.7 03/16/2018     FLP:    Lab Results   Component Value Date    TRIG 100 03/17/2018    HDL 41 03/17/2018    LDLCALC 120 03/17/2018     TSH:    Lab Results   Component Value Date    TSH 0.365 09/30/2018 06/04/2018post op cath  Angiogram/LHC summary:  · There is 100% chronic total occlusion for the LAD with R to L   collaterals  · There is 100% acute thrombotic occlusion of the left main to LCx   stenosis that represents culprit lesion  · Moderate non obstructive disease in RCA with R to L collaterals  · LVEDP mildly elevated at 15 mmHg; No LV-AO gradient on pull back    Intervention summary:  · Complex PCI of left main-LCx using OTW. Femoral access was obtained at   beginning of procedure in anticipation of possible hemodynamic support  · The left main-LCx lesion was pre-dilated (BALL MEDTRONIC [id number]) and   treated with BMSx1 ( BMS INTEGRITY 3.50 X 18). After deployment there was   no residual stenosis, no evidence of dissection and ASHA III flow   distally. Stent was then post dilated (BALL 3.5 X 12 RX NC EUPHO)    Recommendations  · Aspirin indefinitely. Plavix for at least 1 month given, preferably   longer given NSTEMI  · Transfer to Heart 3 team for further care    Echo 06/04/2018    Indication:  S/P NSTEMI, preop CABG        Findings   L Ventricle: The left ventricle is enlarged. There is no left ventricular  hypertrophy. The left ventricular septal wall appears aneurysmal.  There  is diffuse global hypokinesis. The estimated ejection fraction is  55-60%, consistent with normal systolic function. The left ventricular  biplane EF is 55%. EF by 3D echo was 55%.  There is an E to A reversal in  the mitral valve flow pattern suggestive of diastolic dysfunction. The   basal anterolateral, and  mid anterolateral wall segments are  hypokinetic (score 2). Overall wallmotion score index is  1.13 The  global longitudinal strain was -16.8%. EKG 7/10/18  Sinus  Rhythm   WITHIN NORMAL LIMITS      Assessment   Diagnosis Orders   1. Coronary artery disease involving native coronary artery of native heart without angina pectoris     2. Mixed dyslipidemia     3. Orthostatic hypotension     4. S/P angioplasty with stent of the LM to LCX with 100% lad lesion and mod rca lesion- med RX 06/04/2018         Plan   The current meds and labs reviewed    patient is advised to exercise 30 min s a day three times a week and about weight loss ,balance diet and     More fruits and vegetables . Advised aerobic exercise  tOTAL LAD GET COLLATERAL FROM RCA  MED RX  MINIMAL RESISTANCE EXERCISE   DO NOT RECOMMEND WORK WITH LIFTING HEAVY OBJECT LIKE 50 LB ALL THE TIME DURING THE WORK    Continue the current treatment and with constant vigilance to changes in symptoms and also any potential side effects. Return for care or seek medical attention immediately if symptoms got worse and/or develop new symptoms. Coronary artery disease, seems to be stable. Denies angina or change in breathing pattern  Cont asa and plavix  Cont statins and BB  Low salt diet    Hx Low Normal BP- normalized after hydration  Advised hydration     Hyperlipidemia: on statins, followed periodically. Patient need periodic lipid and liver profile. Hypertension, on medical treatment. Seems to be under good control. Patient is compliant with medical treatment. Discussed use, benefit, and side effects of prescribed medications. All patient questions answered. Pt voiced understanding. Instructed to continue current medications, diet and exercise. Continue risk factor modification and medical management. Patient agreed with treatment plan.  Follow up as directed.     RTC in 5 months    [facility]

## 2019-09-30 ENCOUNTER — HOSPITAL ENCOUNTER (OUTPATIENT)
Age: 64
Discharge: HOME | End: 2019-09-30
Payer: COMMERCIAL

## 2019-09-30 ENCOUNTER — OFFICE VISIT (OUTPATIENT)
Dept: CARDIOLOGY CLINIC | Age: 64
End: 2019-09-30
Payer: COMMERCIAL

## 2019-09-30 VITALS
BODY MASS INDEX: 27.4 KG/M2 | DIASTOLIC BLOOD PRESSURE: 82 MMHG | HEIGHT: 69 IN | SYSTOLIC BLOOD PRESSURE: 142 MMHG | HEART RATE: 78 BPM | WEIGHT: 185 LBS

## 2019-09-30 DIAGNOSIS — Z95.820 S/P ANGIOPLASTY WITH STENT: ICD-10-CM

## 2019-09-30 DIAGNOSIS — I25.10 CORONARY ARTERY DISEASE INVOLVING NATIVE CORONARY ARTERY OF NATIVE HEART WITHOUT ANGINA PECTORIS: ICD-10-CM

## 2019-09-30 DIAGNOSIS — I95.1 ORTHOSTATIC HYPOTENSION: ICD-10-CM

## 2019-09-30 DIAGNOSIS — I25.10 CORONARY ARTERY DISEASE INVOLVING NATIVE CORONARY ARTERY OF NATIVE HEART WITHOUT ANGINA PECTORIS: Primary | ICD-10-CM

## 2019-09-30 DIAGNOSIS — E78.2 MIXED DYSLIPIDEMIA: ICD-10-CM

## 2019-09-30 LAB
ALBUMIN SERPL-MCNC: 4.3 G/DL (ref 3.5–5.1)
ALP BLD-CCNC: 111 U/L (ref 38–126)
ALT SERPL-CCNC: 25 U/L (ref 11–66)
AST SERPL-CCNC: 18 U/L (ref 5–40)
BILIRUB SERPL-MCNC: 0.7 MG/DL (ref 0.3–1.2)
BILIRUBIN DIRECT: < 0.2 MG/DL (ref 0–0.3)
CHOLESTEROL, TOTAL: 129 MG/DL (ref 100–199)
HDLC SERPL-MCNC: 52 MG/DL
LDL CHOLESTEROL CALCULATED: 54 MG/DL
TOTAL PROTEIN: 7.1 G/DL (ref 6.1–8)
TRIGL SERPL-MCNC: 116 MG/DL (ref 0–199)

## 2019-09-30 PROCEDURE — 36415 COLL VENOUS BLD VENIPUNCTURE: CPT

## 2019-09-30 PROCEDURE — 80061 LIPID PANEL: CPT

## 2019-09-30 PROCEDURE — 93000 ELECTROCARDIOGRAM COMPLETE: CPT | Performed by: INTERNAL MEDICINE

## 2019-09-30 PROCEDURE — 80076 HEPATIC FUNCTION PANEL: CPT

## 2019-09-30 PROCEDURE — 99214 OFFICE O/P EST MOD 30 MIN: CPT | Performed by: INTERNAL MEDICINE

## 2019-09-30 NOTE — PROGRESS NOTES
09/30/2019    PROT 7.1 09/30/2019    BILITOT 0.7 09/30/2019    BILIDIR <0.2 09/30/2019    LABALBU 4.3 09/30/2019     Magnesium:    Lab Results   Component Value Date    MG 2.2 03/17/2018     Warfarin PT/INR:  No components found for: James Grijalva  HgBA1c:    Lab Results   Component Value Date    LABA1C 5.7 03/16/2018     FLP:    Lab Results   Component Value Date    TRIG 116 09/30/2019    HDL 52 09/30/2019    LDLCALC 54 09/30/2019     TSH:    Lab Results   Component Value Date    TSH 0.365 09/30/2018 06/04/2018post op cath  Angiogram/LHC summary:  · There is 100% chronic total occlusion for the LAD with R to L   collaterals  · There is 100% acute thrombotic occlusion of the left main to LCx   stenosis that represents culprit lesion  · Moderate non obstructive disease in RCA with R to L collaterals  · LVEDP mildly elevated at 15 mmHg; No LV-AO gradient on pull back    Intervention summary:  · Complex PCI of left main-LCx using OTW. Femoral access was obtained at   beginning of procedure in anticipation of possible hemodynamic support  · The left main-LCx lesion was pre-dilated (BALL MEDTRONIC [id number]) and   treated with BMSx1 ( BMS INTEGRITY 3.50 X 18). After deployment there was   no residual stenosis, no evidence of dissection and ASHA III flow   distally. Stent was then post dilated (BALL 3.5 X 12 RX NC EUPHO)    Recommendations  · Aspirin indefinitely. Plavix for at least 1 month given, preferably   longer given NSTEMI  · Transfer to Heart 3 team for further care    Echo 06/04/2018    Indication:  S/P NSTEMI, preop CABG        Findings   L Ventricle: The left ventricle is enlarged. There is no left ventricular  hypertrophy. The left ventricular septal wall appears aneurysmal.  There  is diffuse global hypokinesis. The estimated ejection fraction is  55-60%, consistent with normal systolic function. The left ventricular  biplane EF is 55%. EF by 3D echo was 55%.  There is an E to A reversal in  the Patient agreed with treatment plan. Follow up as directed.     RTC in 6 months    Vee Santana [facility]

## 2019-11-06 ENCOUNTER — NURSE ONLY (OUTPATIENT)
Dept: FAMILY MEDICINE CLINIC | Age: 64
End: 2019-11-06
Payer: COMMERCIAL

## 2019-11-06 DIAGNOSIS — Z23 NEED FOR INFLUENZA VACCINATION: Primary | ICD-10-CM

## 2019-11-06 PROCEDURE — 90471 IMMUNIZATION ADMIN: CPT | Performed by: NURSE PRACTITIONER

## 2019-11-06 PROCEDURE — 90686 IIV4 VACC NO PRSV 0.5 ML IM: CPT | Performed by: NURSE PRACTITIONER

## 2019-11-25 ENCOUNTER — TELEPHONE (OUTPATIENT)
Dept: FAMILY MEDICINE CLINIC | Age: 64
End: 2019-11-25

## 2019-12-28 ENCOUNTER — APPOINTMENT (OUTPATIENT)
Dept: CT IMAGING | Age: 64
End: 2019-12-28
Payer: COMMERCIAL

## 2019-12-28 ENCOUNTER — HOSPITAL ENCOUNTER (EMERGENCY)
Age: 64
Discharge: HOME | End: 2019-12-28
Attending: EMERGENCY MEDICINE
Payer: COMMERCIAL

## 2019-12-28 VITALS
DIASTOLIC BLOOD PRESSURE: 80 MMHG | SYSTOLIC BLOOD PRESSURE: 128 MMHG | WEIGHT: 180 LBS | OXYGEN SATURATION: 96 % | HEART RATE: 66 BPM | BODY MASS INDEX: 26.57 KG/M2 | TEMPERATURE: 98.2 F | RESPIRATION RATE: 18 BRPM

## 2019-12-28 DIAGNOSIS — R51.9 NONINTRACTABLE HEADACHE, UNSPECIFIED CHRONICITY PATTERN, UNSPECIFIED HEADACHE TYPE: Primary | ICD-10-CM

## 2019-12-28 DIAGNOSIS — R11.0 NAUSEA: ICD-10-CM

## 2019-12-28 DIAGNOSIS — J34.89 FRONTAL SINUS PAIN: ICD-10-CM

## 2019-12-28 LAB
ALBUMIN SERPL-MCNC: 4.1 G/DL (ref 3.5–5.1)
ALP BLD-CCNC: 104 U/L (ref 38–126)
ALT SERPL-CCNC: 21 U/L (ref 11–66)
ANION GAP SERPL CALCULATED.3IONS-SCNC: 13 MEQ/L (ref 8–16)
AST SERPL-CCNC: 15 U/L (ref 5–40)
BASOPHILS # BLD: 0.6 %
BASOPHILS ABSOLUTE: 0 THOU/MM3 (ref 0–0.1)
BILIRUB SERPL-MCNC: 0.8 MG/DL (ref 0.3–1.2)
BILIRUBIN DIRECT: < 0.2 MG/DL (ref 0–0.3)
BILIRUBIN URINE: NEGATIVE
BLOOD, URINE: NEGATIVE
BUN BLDV-MCNC: 15 MG/DL (ref 7–22)
CALCIUM SERPL-MCNC: 9.4 MG/DL (ref 8.5–10.5)
CHARACTER, URINE: CLEAR
CHLORIDE BLD-SCNC: 103 MEQ/L (ref 98–111)
CO2: 22 MEQ/L (ref 23–33)
COLOR: YELLOW
CREAT SERPL-MCNC: 1 MG/DL (ref 0.4–1.2)
EKG ATRIAL RATE: 62 BPM
EKG P AXIS: 46 DEGREES
EKG P-R INTERVAL: 214 MS
EKG Q-T INTERVAL: 430 MS
EKG QRS DURATION: 94 MS
EKG QTC CALCULATION (BAZETT): 436 MS
EKG R AXIS: 43 DEGREES
EKG T AXIS: 77 DEGREES
EKG VENTRICULAR RATE: 62 BPM
EOSINOPHIL # BLD: 1.6 %
EOSINOPHILS ABSOLUTE: 0.1 THOU/MM3 (ref 0–0.4)
ERYTHROCYTE [DISTWIDTH] IN BLOOD BY AUTOMATED COUNT: 14.6 % (ref 11.5–14.5)
ERYTHROCYTE [DISTWIDTH] IN BLOOD BY AUTOMATED COUNT: 46.1 FL (ref 35–45)
FLU A ANTIGEN: NEGATIVE
FLU B ANTIGEN: NEGATIVE
GFR SERPL CREATININE-BSD FRML MDRD: 75 ML/MIN/1.73M2
GLUCOSE BLD-MCNC: 120 MG/DL (ref 70–108)
GLUCOSE URINE: 100 MG/DL
HCT VFR BLD CALC: 46.6 % (ref 42–52)
HEMOGLOBIN: 15.2 GM/DL (ref 14–18)
IMMATURE GRANS (ABS): 0.01 THOU/MM3 (ref 0–0.07)
IMMATURE GRANULOCYTES: 0.2 %
KETONES, URINE: NEGATIVE
LEUKOCYTE ESTERASE, URINE: NEGATIVE
LYMPHOCYTES # BLD: 15.4 %
LYMPHOCYTES ABSOLUTE: 1 THOU/MM3 (ref 1–4.8)
MCH RBC QN AUTO: 28.1 PG (ref 26–33)
MCHC RBC AUTO-ENTMCNC: 32.6 GM/DL (ref 32.2–35.5)
MCV RBC AUTO: 86.1 FL (ref 80–94)
MONOCYTES # BLD: 6.3 %
MONOCYTES ABSOLUTE: 0.4 THOU/MM3 (ref 0.4–1.3)
NITRITE, URINE: NEGATIVE
NUCLEATED RED BLOOD CELLS: 0 /100 WBC
OSMOLALITY CALCULATION: 277.7 MOSMOL/KG (ref 275–300)
PH UA: 7.5 (ref 5–9)
PLATELET # BLD: 244 THOU/MM3 (ref 130–400)
PMV BLD AUTO: 9.9 FL (ref 9.4–12.4)
POTASSIUM REFLEX MAGNESIUM: 4.1 MEQ/L (ref 3.5–5.2)
PROTEIN UA: NEGATIVE
RBC # BLD: 5.41 MILL/MM3 (ref 4.7–6.1)
REASON FOR REJECTION: NORMAL
REJECTED TEST: NORMAL
SEG NEUTROPHILS: 75.9 %
SEGMENTED NEUTROPHILS ABSOLUTE COUNT: 4.7 THOU/MM3 (ref 1.8–7.7)
SODIUM BLD-SCNC: 138 MEQ/L (ref 135–145)
SPECIFIC GRAVITY, URINE: 1.01 (ref 1–1.03)
TOTAL PROTEIN: 7 G/DL (ref 6.1–8)
TROPONIN T: < 0.01 NG/ML
UROBILINOGEN, URINE: 0.2 EU/DL (ref 0–1)
WBC # BLD: 6.2 THOU/MM3 (ref 4.8–10.8)

## 2019-12-28 PROCEDURE — 85025 COMPLETE CBC W/AUTO DIFF WBC: CPT

## 2019-12-28 PROCEDURE — 6360000002 HC RX W HCPCS: Performed by: EMERGENCY MEDICINE

## 2019-12-28 PROCEDURE — 80076 HEPATIC FUNCTION PANEL: CPT

## 2019-12-28 PROCEDURE — 87804 INFLUENZA ASSAY W/OPTIC: CPT

## 2019-12-28 PROCEDURE — 81003 URINALYSIS AUTO W/O SCOPE: CPT

## 2019-12-28 PROCEDURE — 99285 EMERGENCY DEPT VISIT HI MDM: CPT

## 2019-12-28 PROCEDURE — 36415 COLL VENOUS BLD VENIPUNCTURE: CPT

## 2019-12-28 PROCEDURE — 93005 ELECTROCARDIOGRAM TRACING: CPT | Performed by: EMERGENCY MEDICINE

## 2019-12-28 PROCEDURE — 70450 CT HEAD/BRAIN W/O DYE: CPT

## 2019-12-28 PROCEDURE — 96374 THER/PROPH/DIAG INJ IV PUSH: CPT

## 2019-12-28 PROCEDURE — 84484 ASSAY OF TROPONIN QUANT: CPT

## 2019-12-28 PROCEDURE — 96375 TX/PRO/DX INJ NEW DRUG ADDON: CPT

## 2019-12-28 PROCEDURE — 80048 BASIC METABOLIC PNL TOTAL CA: CPT

## 2019-12-28 PROCEDURE — 2580000003 HC RX 258: Performed by: EMERGENCY MEDICINE

## 2019-12-28 RX ORDER — 0.9 % SODIUM CHLORIDE 0.9 %
1000 INTRAVENOUS SOLUTION INTRAVENOUS ONCE
Status: COMPLETED | OUTPATIENT
Start: 2019-12-28 | End: 2019-12-28

## 2019-12-28 RX ORDER — PROMETHAZINE HYDROCHLORIDE 25 MG/1
25 TABLET ORAL EVERY 6 HOURS PRN
Qty: 12 TABLET | Refills: 0 | Status: SHIPPED | OUTPATIENT
Start: 2019-12-28 | End: 2019-12-31

## 2019-12-28 RX ORDER — AMOXICILLIN AND CLAVULANATE POTASSIUM 875; 125 MG/1; MG/1
1 TABLET, FILM COATED ORAL 2 TIMES DAILY
Qty: 20 TABLET | Refills: 0 | Status: SHIPPED | OUTPATIENT
Start: 2019-12-28 | End: 2020-01-07

## 2019-12-28 RX ORDER — DIPHENHYDRAMINE HYDROCHLORIDE 50 MG/ML
25 INJECTION INTRAMUSCULAR; INTRAVENOUS ONCE
Status: COMPLETED | OUTPATIENT
Start: 2019-12-28 | End: 2019-12-28

## 2019-12-28 RX ORDER — PROCHLORPERAZINE EDISYLATE 5 MG/ML
10 INJECTION INTRAMUSCULAR; INTRAVENOUS ONCE
Status: COMPLETED | OUTPATIENT
Start: 2019-12-28 | End: 2019-12-28

## 2019-12-28 RX ORDER — OXYMETAZOLINE HYDROCHLORIDE 0.05 G/100ML
2 SPRAY NASAL 2 TIMES DAILY
Qty: 1 BOTTLE | Refills: 3 | Status: SHIPPED | OUTPATIENT
Start: 2019-12-28 | End: 2019-12-31

## 2019-12-28 RX ORDER — DEXAMETHASONE SODIUM PHOSPHATE 4 MG/ML
10 INJECTION, SOLUTION INTRA-ARTICULAR; INTRALESIONAL; INTRAMUSCULAR; INTRAVENOUS; SOFT TISSUE ONCE
Status: COMPLETED | OUTPATIENT
Start: 2019-12-28 | End: 2019-12-28

## 2019-12-28 RX ADMIN — PROCHLORPERAZINE EDISYLATE 10 MG: 5 INJECTION INTRAMUSCULAR; INTRAVENOUS at 19:48

## 2019-12-28 RX ADMIN — DEXAMETHASONE SODIUM PHOSPHATE 10 MG: 4 INJECTION, SOLUTION INTRAMUSCULAR; INTRAVENOUS at 19:49

## 2019-12-28 RX ADMIN — DIPHENHYDRAMINE HYDROCHLORIDE 25 MG: 50 INJECTION INTRAMUSCULAR; INTRAVENOUS at 19:48

## 2019-12-28 RX ADMIN — SODIUM CHLORIDE 1000 ML: 9 INJECTION, SOLUTION INTRAVENOUS at 19:49

## 2019-12-28 ASSESSMENT — ENCOUNTER SYMPTOMS
VOMITING: 1
ABDOMINAL PAIN: 0
SHORTNESS OF BREATH: 0
NAUSEA: 1
BLOOD IN STOOL: 0
TROUBLE SWALLOWING: 0
SINUS PRESSURE: 0
RHINORRHEA: 1
SINUS PAIN: 1

## 2019-12-28 ASSESSMENT — PAIN DESCRIPTION - PAIN TYPE: TYPE: ACUTE PAIN

## 2019-12-28 ASSESSMENT — PAIN DESCRIPTION - FREQUENCY: FREQUENCY: CONTINUOUS

## 2019-12-28 ASSESSMENT — PAIN SCALES - GENERAL
PAINLEVEL_OUTOF10: 5
PAINLEVEL_OUTOF10: 1

## 2019-12-30 ENCOUNTER — TELEPHONE (OUTPATIENT)
Dept: FAMILY MEDICINE CLINIC | Age: 64
End: 2019-12-30

## 2019-12-31 ENCOUNTER — TELEPHONE (OUTPATIENT)
Dept: FAMILY MEDICINE CLINIC | Age: 64
End: 2019-12-31

## 2020-01-20 ENCOUNTER — OFFICE VISIT (OUTPATIENT)
Dept: FAMILY MEDICINE CLINIC | Age: 65
End: 2020-01-20
Payer: COMMERCIAL

## 2020-01-20 VITALS
WEIGHT: 185.4 LBS | BODY MASS INDEX: 27.37 KG/M2 | DIASTOLIC BLOOD PRESSURE: 76 MMHG | SYSTOLIC BLOOD PRESSURE: 130 MMHG | RESPIRATION RATE: 18 BRPM | HEART RATE: 72 BPM

## 2020-01-20 PROCEDURE — 99213 OFFICE O/P EST LOW 20 MIN: CPT | Performed by: NURSE PRACTITIONER

## 2020-01-20 SDOH — ECONOMIC STABILITY: FOOD INSECURITY: WITHIN THE PAST 12 MONTHS, THE FOOD YOU BOUGHT JUST DIDN'T LAST AND YOU DIDN'T HAVE MONEY TO GET MORE.: NEVER TRUE

## 2020-01-20 SDOH — ECONOMIC STABILITY: TRANSPORTATION INSECURITY
IN THE PAST 12 MONTHS, HAS LACK OF TRANSPORTATION KEPT YOU FROM MEETINGS, WORK, OR FROM GETTING THINGS NEEDED FOR DAILY LIVING?: NO

## 2020-01-20 SDOH — ECONOMIC STABILITY: TRANSPORTATION INSECURITY
IN THE PAST 12 MONTHS, HAS THE LACK OF TRANSPORTATION KEPT YOU FROM MEDICAL APPOINTMENTS OR FROM GETTING MEDICATIONS?: NO

## 2020-01-20 SDOH — ECONOMIC STABILITY: FOOD INSECURITY: WITHIN THE PAST 12 MONTHS, YOU WORRIED THAT YOUR FOOD WOULD RUN OUT BEFORE YOU GOT MONEY TO BUY MORE.: NEVER TRUE

## 2020-01-20 SDOH — ECONOMIC STABILITY: INCOME INSECURITY: HOW HARD IS IT FOR YOU TO PAY FOR THE VERY BASICS LIKE FOOD, HOUSING, MEDICAL CARE, AND HEATING?: SOMEWHAT HARD

## 2020-01-20 ASSESSMENT — ENCOUNTER SYMPTOMS
COUGH: 0
ABDOMINAL PAIN: 0
NAUSEA: 0
SHORTNESS OF BREATH: 0

## 2020-01-20 ASSESSMENT — PATIENT HEALTH QUESTIONNAIRE - PHQ9
SUM OF ALL RESPONSES TO PHQ QUESTIONS 1-9: 0
2. FEELING DOWN, DEPRESSED OR HOPELESS: 0
1. LITTLE INTEREST OR PLEASURE IN DOING THINGS: 0
SUM OF ALL RESPONSES TO PHQ QUESTIONS 1-9: 0
SUM OF ALL RESPONSES TO PHQ9 QUESTIONS 1 & 2: 0

## 2020-01-20 NOTE — PROGRESS NOTES
Subjective:      Patient ID: Fito Dallas is a 59 y.o. male. HPI: Follow Up    Chief Complaint   Patient presents with    Headache     in [address] [place] [address] ED on 12/28/19 -  needs a colonoscopy     Was dealing with HA 2 weeks prior to ED visit 12/28/19. Started vomiting which took him to ED. CT Head was NEG. Placed on ATB for sinus infection. CT Head 12/28/19:     FINDINGS: There is no mass effect, midline shift or acute hemorrhage. Ventricles and CSF spaces are within normal limits. Gray-white matter differentiation is preserved. Visualized orbits, paranasal sinuses and mastoid air cells are unremarkable.           Impression   No mass effect or acute hemorrhage. No hx of headaches. No HA since ED visit. Denies visual changes. Denies coordination. Hx of thyroid cancer. Patient Active Problem List   Diagnosis    Tinnitus    Hearing loss d/t noise    Impaired fasting blood sugar    Primary osteoarthritis of left knee    Liver disease    Syncope and collapse    Near syncope    Mixed dyslipidemia    Intermittent chest pain    Postural dizziness with presyncope    Orthostatic hypotension    S/P angioplasty with stent of the LM to LCX with 100% lad lesion and mod rca lesion- med RX 06/04/2018    Coronary artery disease involving native coronary artery of native heart without angina pectoris         Review of Systems   Constitutional: Negative for chills and fever. HENT: Negative. Respiratory: Negative for cough and shortness of breath. Cardiovascular: Negative for chest pain. Gastrointestinal: Negative for abdominal pain and nausea. Skin: Negative for rash. Neurological: Negative for dizziness, light-headedness and headaches. Psychiatric/Behavioral: Negative. Objective:   Physical Exam  Constitutional:       General: He is not in acute distress. Eyes:      Pupils: Pupils are equal, round, and reactive to light.    Neck:      Musculoskeletal: Normal range of motion and neck supple. Cardiovascular:      Rate and Rhythm: Normal rate and regular rhythm. Heart sounds: No murmur. Pulmonary:      Effort: Pulmonary effort is normal.      Breath sounds: Normal breath sounds. No wheezing. Abdominal:      General: Bowel sounds are normal. There is no distension. Palpations: Abdomen is soft. Tenderness: There is no tenderness. Musculoskeletal: Normal range of motion. General: No tenderness. Skin:     General: Skin is warm and dry. Findings: No rash. Assessment:       Diagnosis Orders   1. Episodic tension-type headache, not intractable     2.  Screening for diabetes mellitus  Hemoglobin A1C           Plan:      ED visit reviewed  No HA since ED visit  Likely infectious reason for HA  A1C for screening  RTO if symptoms worsen or stay the same          Mony Dupont, APRN - CNP

## 2020-02-03 ENCOUNTER — TELEPHONE (OUTPATIENT)
Dept: CARDIOLOGY CLINIC | Age: 65
End: 2020-02-03

## 2020-02-04 ENCOUNTER — TELEPHONE (OUTPATIENT)
Dept: FAMILY MEDICINE CLINIC | Age: 65
End: 2020-02-04

## 2020-02-04 PROBLEM — I21.4 NON-ST ELEVATION (NSTEMI) MYOCARDIAL INFARCTION (HCC): Status: ACTIVE | Noted: 2018-06-01

## 2020-02-04 PROBLEM — C73 MALIGNANT NEOPLASM OF THYROID GLAND (HCC): Status: ACTIVE | Noted: 2019-03-15

## 2020-02-04 RX ORDER — AMOXICILLIN 875 MG/1
875 TABLET, COATED ORAL 2 TIMES DAILY
Qty: 20 TABLET | Refills: 0 | Status: SHIPPED | OUTPATIENT
Start: 2020-02-04 | End: 2020-02-14

## 2020-02-05 NOTE — TELEPHONE ENCOUNTER
May proceed with mod risk  Hold plavix for 5 days only is  enough   I am okay holding asa for 3 days

## 2020-05-22 ENCOUNTER — OFFICE VISIT (OUTPATIENT)
Dept: CARDIOLOGY CLINIC | Age: 65
End: 2020-05-22
Payer: MEDICARE

## 2020-05-22 VITALS
SYSTOLIC BLOOD PRESSURE: 126 MMHG | BODY MASS INDEX: 27.67 KG/M2 | HEART RATE: 80 BPM | WEIGHT: 186.8 LBS | DIASTOLIC BLOOD PRESSURE: 74 MMHG | HEIGHT: 69 IN

## 2020-05-22 PROBLEM — R07.9 INTERMITTENT CHEST PAIN: Status: RESOLVED | Noted: 2018-03-16 | Resolved: 2020-05-22

## 2020-05-22 PROCEDURE — G8417 CALC BMI ABV UP PARAM F/U: HCPCS | Performed by: INTERNAL MEDICINE

## 2020-05-22 PROCEDURE — 1036F TOBACCO NON-USER: CPT | Performed by: INTERNAL MEDICINE

## 2020-05-22 PROCEDURE — 4040F PNEUMOC VAC/ADMIN/RCVD: CPT | Performed by: INTERNAL MEDICINE

## 2020-05-22 PROCEDURE — 99214 OFFICE O/P EST MOD 30 MIN: CPT | Performed by: INTERNAL MEDICINE

## 2020-05-22 PROCEDURE — 3017F COLORECTAL CA SCREEN DOC REV: CPT | Performed by: INTERNAL MEDICINE

## 2020-05-22 PROCEDURE — 1123F ACP DISCUSS/DSCN MKR DOCD: CPT | Performed by: INTERNAL MEDICINE

## 2020-05-22 PROCEDURE — G8427 DOCREV CUR MEDS BY ELIG CLIN: HCPCS | Performed by: INTERNAL MEDICINE

## 2020-05-22 NOTE — PROGRESS NOTES
Chief Complaint   Patient presents with    6 Month Follow-Up    Coronary Artery Disease     Originally  patient here - ref by Dr. Elizabeth Galaviz - NSTEMI  referred from [facility] post Thyriod surgery  He sufferred NSTEMI post op in [facility]        6 month follow-up    No dizziness    Denied cp, sob, dizziness, swelling or palpitations    Reviewed the record from [facility]      Patient Active Problem List   Diagnosis    Tinnitus    Hearing loss d/t noise    Impaired fasting blood sugar    Primary osteoarthritis of left knee    Liver disease    Syncope and collapse    Near syncope    Mixed dyslipidemia    Postural dizziness with presyncope    Orthostatic hypotension    S/P angioplasty with stent of the LM to LCX with 100% lad lesion and mod rca lesion- med RX 06/04/2018    Coronary artery disease involving native coronary artery of native heart without angina pectoris    Non-ST elevation (NSTEMI) myocardial infarction ([address]    Malignant neoplasm of thyroid gland (HCC)       Past Surgical History:   Procedure Laterality Date    CARPAL TUNNEL RELEASE      CHOLECYSTECTOMY      HERNIA REPAIR      THYROIDECTOMY      TONSILLECTOMY AND ADENOIDECTOMY      TOTAL KNEE ARTHROPLASTY Left        No Known Allergies     Family History   Problem Relation Age of Onset    Heart Disease Mother     High Blood Pressure Mother     High Cholesterol Mother     Arthritis Mother     Miscarriages / Stillbirths Mother     Heart Disease Father     Arthritis Father     Cancer Brother         melanoma        Social History     Socioeconomic History    Marital status:      Spouse name: Karla Alejo Number of children: 3    Years of education: 15    Highest education level: Some college, no degree   Occupational History    Not on file   Social Needs    Financial resource strain: Somewhat hard    Food insecurity     Worry: Never true     Inability: Never true    Transportation needs     Medical: No     Non-medical: No   Tobacco Use    Smoking status: Never Smoker    Smokeless tobacco: Never Used   Substance and Sexual Activity    Alcohol use: No    Drug use: No    Sexual activity: Yes     Partners: Female   Lifestyle    Physical activity     Days per week: Not on file     Minutes per session: Not on file    Stress: Not on file   Relationships    Social connections     Talks on phone: Not on file     Gets together: Not on file     Attends Taoism service: Not on file     Active member of club or organization: Not on file     Attends meetings of clubs or organizations: Not on file     Relationship status: Not on file    Intimate partner violence     Fear of current or ex partner: Not on file     Emotionally abused: Not on file     Physically abused: Not on file     Forced sexual activity: Not on file   Other Topics Concern    Not on file   Social History Narrative    Not on file       Current Outpatient Medications   Medication Sig Dispense Refill    metoprolol succinate (TOPROL XL) 25 MG extended release tablet TAKE 1 TABLET BY MOUTH DAILY 30 tablet 11    atorvastatin (LIPITOR) 40 MG tablet TAKE 1 TABLET BY MOUTH DAILY 30 tablet 11    clopidogrel (PLAVIX) 75 MG tablet TAKE 1 TABLET BY MOUTH DAILY 30 tablet 11    aspirin 81 MG chewable tablet CHEW AND SWALLOW 1 TABLET BY MOUTH DAILY 30 tablet 11    nitroGLYCERIN (NITROSTAT) 0.4 MG SL tablet Take 1 tablet at onset of chest pain, if no relief in 5 min may repeat x 2 then call 911 25 tablet 3    levothyroxine (SYNTHROID) 125 MCG tablet       finasteride (PROSCAR) 5 MG tablet Take 1 tablet by mouth daily 30 tablet 3     No current facility-administered medications for this visit. Review of Systems -     General ROS: negative  Psychological ROS: negative  Hematological and Lymphatic ROS: No history of blood clots or bleeding disorder.    Respiratory ROS: no cough, shortness of breath, or wheezing  Cardiovascular ROS: no chest pain or dyspnea on exertion  Gastrointestinal

## 2020-05-29 ENCOUNTER — HOSPITAL ENCOUNTER (OUTPATIENT)
Age: 65
Discharge: HOME | End: 2020-05-29
Payer: MEDICARE

## 2020-05-29 LAB
ALBUMIN SERPL-MCNC: 4.2 G/DL (ref 3.5–5.1)
ALP BLD-CCNC: 102 U/L (ref 38–126)
ALT SERPL-CCNC: 21 U/L (ref 11–66)
ANION GAP SERPL CALCULATED.3IONS-SCNC: 8 MEQ/L (ref 8–16)
AST SERPL-CCNC: 16 U/L (ref 5–40)
BILIRUB SERPL-MCNC: 0.6 MG/DL (ref 0.3–1.2)
BILIRUBIN DIRECT: < 0.2 MG/DL (ref 0–0.3)
BUN BLDV-MCNC: 18 MG/DL (ref 7–22)
CALCIUM SERPL-MCNC: 9.2 MG/DL (ref 8.5–10.5)
CHLORIDE BLD-SCNC: 105 MEQ/L (ref 98–111)
CHOLESTEROL, TOTAL: 133 MG/DL (ref 100–199)
CO2: 27 MEQ/L (ref 23–33)
CREAT SERPL-MCNC: 1.1 MG/DL (ref 0.4–1.2)
GFR SERPL CREATININE-BSD FRML MDRD: 67 ML/MIN/1.73M2
GLUCOSE BLD-MCNC: 95 MG/DL (ref 70–108)
HDLC SERPL-MCNC: 57 MG/DL
LDL CHOLESTEROL CALCULATED: 55 MG/DL
MAGNESIUM: 2.3 MG/DL (ref 1.6–2.4)
POTASSIUM SERPL-SCNC: 4.5 MEQ/L (ref 3.5–5.2)
SODIUM BLD-SCNC: 140 MEQ/L (ref 135–145)
T4 FREE: 1.43 NG/DL (ref 0.93–1.76)
TOTAL PROTEIN: 6.8 G/DL (ref 6.1–8)
TRIGL SERPL-MCNC: 106 MG/DL (ref 0–199)
TSH SERPL DL<=0.05 MIU/L-ACNC: 1.38 UIU/ML (ref 0.4–4.2)

## 2020-05-29 PROCEDURE — 84443 ASSAY THYROID STIM HORMONE: CPT

## 2020-05-29 PROCEDURE — 80053 COMPREHEN METABOLIC PANEL: CPT

## 2020-05-29 PROCEDURE — 84432 ASSAY OF THYROGLOBULIN: CPT

## 2020-05-29 PROCEDURE — 36415 COLL VENOUS BLD VENIPUNCTURE: CPT

## 2020-05-29 PROCEDURE — 84439 ASSAY OF FREE THYROXINE: CPT

## 2020-05-29 PROCEDURE — 80061 LIPID PANEL: CPT

## 2020-05-29 PROCEDURE — 83735 ASSAY OF MAGNESIUM: CPT

## 2020-05-29 PROCEDURE — 82248 BILIRUBIN DIRECT: CPT

## 2020-05-29 PROCEDURE — 86800 THYROGLOBULIN ANTIBODY: CPT

## 2020-05-30 LAB — THYROGLOBULIN: NORMAL

## 2020-06-30 RX ORDER — METOPROLOL SUCCINATE 25 MG/1
25 TABLET, EXTENDED RELEASE ORAL DAILY
Qty: 30 TABLET | Refills: 11 | Status: SHIPPED | OUTPATIENT
Start: 2020-06-30 | End: 2021-06-28

## 2020-06-30 RX ORDER — ATORVASTATIN CALCIUM 40 MG/1
40 TABLET, FILM COATED ORAL DAILY
Qty: 30 TABLET | Refills: 11 | Status: SHIPPED | OUTPATIENT
Start: 2020-06-30 | End: 2021-06-28

## 2020-06-30 RX ORDER — ASPIRIN 81 MG/1
TABLET, CHEWABLE ORAL
Qty: 30 TABLET | Refills: 11 | Status: SHIPPED | OUTPATIENT
Start: 2020-06-30 | End: 2021-07-06

## 2020-06-30 RX ORDER — CLOPIDOGREL BISULFATE 75 MG/1
75 TABLET ORAL DAILY
Qty: 30 TABLET | Refills: 11 | Status: SHIPPED | OUTPATIENT
Start: 2020-06-30 | End: 2021-06-28

## 2020-07-08 ENCOUNTER — TELEMEDICINE (OUTPATIENT)
Dept: FAMILY MEDICINE CLINIC | Age: 65
End: 2020-07-08
Payer: MEDICARE

## 2020-07-08 PROCEDURE — 3017F COLORECTAL CA SCREEN DOC REV: CPT | Performed by: NURSE PRACTITIONER

## 2020-07-08 PROCEDURE — 4040F PNEUMOC VAC/ADMIN/RCVD: CPT | Performed by: NURSE PRACTITIONER

## 2020-07-08 PROCEDURE — G8428 CUR MEDS NOT DOCUMENT: HCPCS | Performed by: NURSE PRACTITIONER

## 2020-07-08 PROCEDURE — 99213 OFFICE O/P EST LOW 20 MIN: CPT | Performed by: NURSE PRACTITIONER

## 2020-07-08 PROCEDURE — 1123F ACP DISCUSS/DSCN MKR DOCD: CPT | Performed by: NURSE PRACTITIONER

## 2020-07-08 ASSESSMENT — ENCOUNTER SYMPTOMS
NAUSEA: 1
ABDOMINAL PAIN: 0
PHOTOPHOBIA: 1
COUGH: 0
SHORTNESS OF BREATH: 0

## 2020-07-08 NOTE — PROGRESS NOTES
Patient informed and scheduled spoke with wife Jamal Hendrix on HIPAA . MRI brain scheduled for 7/15/2020 arrival to main radiology at 3:30 pm for 4 pm scan. Office to call with results once received. Diadnra voiced understanding.

## 2020-07-08 NOTE — PROGRESS NOTES
Subjective:      Patient ID: Cornelius Marquez is a 72 y.o. male    HPI: Acute for HA    TELEHEALTH EVALUATION -- Audio/Visual (During [facility] public [facility] emergency)    Services were provided through a video synchronous discussion virtually to substitute for in-person clinic visit. Patient and provider were located at their individual homes. Patient has requested an audio/video evaluation for the following concern(s):    Chief Complaint   Patient presents with    Nausea       Onset of 1 day with a HA. Nausea, dry heeving and fatigue. Sick to his stomach. No diarrhea. No nasal congestion, cough or SOB. Symptoms are improved today. No fevers. Similar episode in December 2019 that he was seen at ED in [facility]. CT HEAD was NEG at that time. No hx of migraines or HA. Was  at [employer] - was wearing mask and gloves. Patient Active Problem List   Diagnosis    Tinnitus    Hearing loss d/t noise    Impaired fasting blood sugar    Primary osteoarthritis of left knee    Liver disease    Syncope and collapse    Near syncope    Mixed dyslipidemia    Postural dizziness with presyncope    Orthostatic hypotension    S/P angioplasty with stent of the LM to LCX with 100% lad lesion and mod rca lesion- med RX 06/04/2018    Coronary artery disease involving native coronary artery of native heart without angina pectoris    Non-ST elevation (NSTEMI) myocardial infarction ([address]    Malignant neoplasm of thyroid gland ([address]       Review of Systems   Constitutional: Positive for activity change, appetite change and fatigue. Negative for chills and fever. HENT: Negative. Eyes: Positive for photophobia. Respiratory: Negative for cough and shortness of breath. Cardiovascular: Negative for chest pain. Gastrointestinal: Positive for nausea. Negative for abdominal pain. Skin: Negative for rash. Neurological: Positive for headaches.  Negative for dizziness and light-headedness. Psychiatric/Behavioral: Negative. Objective:   Physical Exam  Constitutional:       General: He is not in acute distress. Appearance: He is not ill-appearing. Pulmonary:      Effort: Pulmonary effort is normal. No respiratory distress. Neurological:      Mental Status: He is alert and oriented to person, place, and time. Psychiatric:         Mood and Affect: Mood normal.         Behavior: Behavior normal.         Assessment:       Diagnosis Orders   1. New onset of headaches after age 48  MRI BRAIN WO CONTRAST   2. Migraine with aura and without status migrainosus, not intractable  MRI BRAIN WO CONTRAST   3. Malignant neoplasm of thyroid gland ([facility] [address]  MRI BRAIN WO CONTRAST       Plan:   Symptoms appear migraine related  MRI Brain - due to new onset migraine and hx thyroid cancer  Symptoms improving  Rest and fluids  RTO if symptoms worsen or stay the same      Due to this being a TeleHealth encounter (During [facility] public [facility] emergency), evaluation of the following organ systems was limited: Vitals/Constitutional/EENT/Resp/CV/GI//MS/Neuro/Skin/Heme-Lymph-Imm. Pursuant to the emergency declaration under the [facility], [address] [place] [address] waiver authority and the [employer] Act, this Virtual Visit was conducted with patient's (and/or legal guardian's) consent, to reduce the patient's risk of exposure to COVID-19 and provide necessary medical care. The patient (and/or legal guardian) has also been advised to contact this office for worsening conditions or problems, and seek emergency medical treatment and/or call 911 if deemed necessary.       [unfilled]

## 2020-07-15 ENCOUNTER — HOSPITAL ENCOUNTER (OUTPATIENT)
Dept: MRI IMAGING | Age: 65
Discharge: HOME | End: 2020-07-15
Payer: MEDICARE

## 2020-07-15 PROCEDURE — 70551 MRI BRAIN STEM W/O DYE: CPT

## 2020-07-16 ENCOUNTER — TELEPHONE (OUTPATIENT)
Dept: FAMILY MEDICINE CLINIC | Age: 65
End: 2020-07-16

## 2020-07-16 RX ORDER — TOPIRAMATE 50 MG/1
TABLET, FILM COATED ORAL
Qty: 90 TABLET | Refills: 1 | Status: SHIPPED | OUTPATIENT
Start: 2020-07-16 | End: 2021-01-22 | Stop reason: SDUPTHER

## 2020-07-16 RX ORDER — TOPIRAMATE 50 MG/1
50 TABLET, FILM COATED ORAL NIGHTLY
Qty: 30 TABLET | Refills: 5 | Status: SHIPPED | OUTPATIENT
Start: 2020-07-16 | End: 2020-07-16

## 2020-07-16 NOTE — TELEPHONE ENCOUNTER
Fax received from pharmacy for PA Topamax 50mg. PA submitted onlinet at [employer] and APPROVED      [id number];[id number]; Review Type:Prior Auth; Coverage Start Date:06/16/2020; Coverage End Date:07/16/2021;    Pharmacy RPH: Smitha notified

## 2020-08-09 ENCOUNTER — APPOINTMENT (OUTPATIENT)
Dept: GENERAL RADIOLOGY | Age: 65
End: 2020-08-09
Payer: MEDICARE

## 2020-08-09 ENCOUNTER — HOSPITAL ENCOUNTER (OUTPATIENT)
Age: 65
Setting detail: OBSERVATION
Discharge: HOME | End: 2020-08-10
Attending: EMERGENCY MEDICINE | Admitting: FAMILY MEDICINE
Payer: MEDICARE

## 2020-08-09 ENCOUNTER — APPOINTMENT (OUTPATIENT)
Dept: CT IMAGING | Age: 65
End: 2020-08-09
Payer: MEDICARE

## 2020-08-09 PROBLEM — R26.81 GAIT INSTABILITY: Status: ACTIVE | Noted: 2020-08-09

## 2020-08-09 LAB
ANION GAP SERPL CALCULATED.3IONS-SCNC: 10 MEQ/L (ref 8–16)
BASOPHILS # BLD: 0.7 %
BASOPHILS ABSOLUTE: 0 THOU/MM3 (ref 0–0.1)
BUN BLDV-MCNC: 22 MG/DL (ref 7–22)
CALCIUM SERPL-MCNC: 8.2 MG/DL (ref 8.5–10.5)
CHLORIDE BLD-SCNC: 110 MEQ/L (ref 98–111)
CO2: 21 MEQ/L (ref 23–33)
CREAT SERPL-MCNC: 1 MG/DL (ref 0.4–1.2)
EKG ATRIAL RATE: 72 BPM
EKG P AXIS: 55 DEGREES
EKG P-R INTERVAL: 198 MS
EKG Q-T INTERVAL: 398 MS
EKG QRS DURATION: 98 MS
EKG QTC CALCULATION (BAZETT): 435 MS
EKG R AXIS: 61 DEGREES
EKG T AXIS: 81 DEGREES
EKG VENTRICULAR RATE: 72 BPM
EOSINOPHIL # BLD: 3.1 %
EOSINOPHILS ABSOLUTE: 0.1 THOU/MM3 (ref 0–0.4)
ERYTHROCYTE [DISTWIDTH] IN BLOOD BY AUTOMATED COUNT: 15.4 % (ref 11.5–14.5)
ERYTHROCYTE [DISTWIDTH] IN BLOOD BY AUTOMATED COUNT: 49 FL (ref 35–45)
GFR SERPL CREATININE-BSD FRML MDRD: 75 ML/MIN/1.73M2
GLUCOSE BLD-MCNC: 114 MG/DL (ref 70–108)
HCT VFR BLD CALC: 42.3 % (ref 42–52)
HEMOGLOBIN: 13.6 GM/DL (ref 14–18)
IMMATURE GRANS (ABS): 0.02 THOU/MM3 (ref 0–0.07)
IMMATURE GRANULOCYTES: 0.5 %
LYMPHOCYTES # BLD: 20.8 %
LYMPHOCYTES ABSOLUTE: 0.9 THOU/MM3 (ref 1–4.8)
MCH RBC QN AUTO: 28.2 PG (ref 26–33)
MCHC RBC AUTO-ENTMCNC: 32.2 GM/DL (ref 32.2–35.5)
MCV RBC AUTO: 87.6 FL (ref 80–94)
MONOCYTES # BLD: 8.6 %
MONOCYTES ABSOLUTE: 0.4 THOU/MM3 (ref 0.4–1.3)
NUCLEATED RED BLOOD CELLS: 0 /100 WBC
OSMOLALITY CALCULATION: 285.5 MOSMOL/KG (ref 275–300)
PLATELET # BLD: 200 THOU/MM3 (ref 130–400)
PMV BLD AUTO: 9.7 FL (ref 9.4–12.4)
POTASSIUM REFLEX MAGNESIUM: 3.6 MEQ/L (ref 3.5–5.2)
RBC # BLD: 4.83 MILL/MM3 (ref 4.7–6.1)
SARS-COV-2, NAAT: NOT DETECTED
SEG NEUTROPHILS: 66.3 %
SEGMENTED NEUTROPHILS ABSOLUTE COUNT: 2.8 THOU/MM3 (ref 1.8–7.7)
SODIUM BLD-SCNC: 141 MEQ/L (ref 135–145)
TROPONIN T: < 0.01 NG/ML
WBC # BLD: 4.2 THOU/MM3 (ref 4.8–10.8)

## 2020-08-09 PROCEDURE — 70498 CT ANGIOGRAPHY NECK: CPT

## 2020-08-09 PROCEDURE — 93005 ELECTROCARDIOGRAM TRACING: CPT | Performed by: STUDENT IN AN ORGANIZED HEALTH CARE EDUCATION/TRAINING PROGRAM

## 2020-08-09 PROCEDURE — 36415 COLL VENOUS BLD VENIPUNCTURE: CPT

## 2020-08-09 PROCEDURE — 2580000003 HC RX 258: Performed by: FAMILY MEDICINE

## 2020-08-09 PROCEDURE — 6370000000 HC RX 637 (ALT 250 FOR IP): Performed by: FAMILY MEDICINE

## 2020-08-09 PROCEDURE — 70496 CT ANGIOGRAPHY HEAD: CPT

## 2020-08-09 PROCEDURE — 99284 EMERGENCY DEPT VISIT MOD MDM: CPT

## 2020-08-09 PROCEDURE — 84484 ASSAY OF TROPONIN QUANT: CPT

## 2020-08-09 PROCEDURE — G0378 HOSPITAL OBSERVATION PER HR: HCPCS

## 2020-08-09 PROCEDURE — U0002 COVID-19 LAB TEST NON-CDC: HCPCS

## 2020-08-09 PROCEDURE — 6360000004 HC RX CONTRAST MEDICATION: Performed by: STUDENT IN AN ORGANIZED HEALTH CARE EDUCATION/TRAINING PROGRAM

## 2020-08-09 PROCEDURE — 93010 ELECTROCARDIOGRAM REPORT: CPT | Performed by: INTERNAL MEDICINE

## 2020-08-09 PROCEDURE — 85025 COMPLETE CBC W/AUTO DIFF WBC: CPT

## 2020-08-09 PROCEDURE — 70450 CT HEAD/BRAIN W/O DYE: CPT

## 2020-08-09 PROCEDURE — 71046 X-RAY EXAM CHEST 2 VIEWS: CPT

## 2020-08-09 PROCEDURE — 80048 BASIC METABOLIC PNL TOTAL CA: CPT

## 2020-08-09 PROCEDURE — 99220 PR INITIAL OBSERVATION CARE/DAY 70 MINUTES: CPT | Performed by: FAMILY MEDICINE

## 2020-08-09 RX ORDER — LEVOTHYROXINE SODIUM 0.12 MG/1
125 TABLET ORAL DAILY
Status: DISCONTINUED | OUTPATIENT
Start: 2020-08-10 | End: 2020-08-10 | Stop reason: HOSPADM

## 2020-08-09 RX ORDER — SODIUM CHLORIDE 0.9 % (FLUSH) 0.9 %
10 SYRINGE (ML) INJECTION EVERY 12 HOURS SCHEDULED
Status: DISCONTINUED | OUTPATIENT
Start: 2020-08-09 | End: 2020-08-10 | Stop reason: HOSPADM

## 2020-08-09 RX ORDER — POLYETHYLENE GLYCOL 3350 17 G/17G
17 POWDER, FOR SOLUTION ORAL DAILY PRN
Status: DISCONTINUED | OUTPATIENT
Start: 2020-08-09 | End: 2020-08-10 | Stop reason: HOSPADM

## 2020-08-09 RX ORDER — ATORVASTATIN CALCIUM 40 MG/1
40 TABLET, FILM COATED ORAL DAILY
Status: DISCONTINUED | OUTPATIENT
Start: 2020-08-10 | End: 2020-08-10 | Stop reason: HOSPADM

## 2020-08-09 RX ORDER — ASPIRIN 81 MG/1
81 TABLET, CHEWABLE ORAL DAILY
Status: DISCONTINUED | OUTPATIENT
Start: 2020-08-10 | End: 2020-08-10 | Stop reason: HOSPADM

## 2020-08-09 RX ORDER — PROMETHAZINE HYDROCHLORIDE 25 MG/1
12.5 TABLET ORAL EVERY 6 HOURS PRN
Status: DISCONTINUED | OUTPATIENT
Start: 2020-08-09 | End: 2020-08-10 | Stop reason: HOSPADM

## 2020-08-09 RX ORDER — ONDANSETRON 2 MG/ML
4 INJECTION INTRAMUSCULAR; INTRAVENOUS EVERY 6 HOURS PRN
Status: DISCONTINUED | OUTPATIENT
Start: 2020-08-09 | End: 2020-08-10 | Stop reason: HOSPADM

## 2020-08-09 RX ORDER — SODIUM CHLORIDE 0.9 % (FLUSH) 0.9 %
10 SYRINGE (ML) INJECTION PRN
Status: DISCONTINUED | OUTPATIENT
Start: 2020-08-09 | End: 2020-08-10 | Stop reason: HOSPADM

## 2020-08-09 RX ORDER — ACETAMINOPHEN 325 MG/1
650 TABLET ORAL EVERY 6 HOURS PRN
Status: DISCONTINUED | OUTPATIENT
Start: 2020-08-09 | End: 2020-08-10 | Stop reason: HOSPADM

## 2020-08-09 RX ORDER — CLOPIDOGREL BISULFATE 75 MG/1
75 TABLET ORAL DAILY
Status: DISCONTINUED | OUTPATIENT
Start: 2020-08-10 | End: 2020-08-10 | Stop reason: HOSPADM

## 2020-08-09 RX ORDER — TOPIRAMATE 25 MG/1
50 TABLET ORAL NIGHTLY
Status: DISCONTINUED | OUTPATIENT
Start: 2020-08-09 | End: 2020-08-10 | Stop reason: HOSPADM

## 2020-08-09 RX ORDER — ACETAMINOPHEN 650 MG/1
650 SUPPOSITORY RECTAL EVERY 6 HOURS PRN
Status: DISCONTINUED | OUTPATIENT
Start: 2020-08-09 | End: 2020-08-10 | Stop reason: HOSPADM

## 2020-08-09 RX ADMIN — TOPIRAMATE 50 MG: 25 TABLET, FILM COATED ORAL at 22:02

## 2020-08-09 RX ADMIN — Medication 10 ML: at 22:02

## 2020-08-09 RX ADMIN — ACETAMINOPHEN 650 MG: 325 TABLET ORAL at 22:44

## 2020-08-09 RX ADMIN — IOPAMIDOL 80 ML: 755 INJECTION, SOLUTION INTRAVENOUS at 16:27

## 2020-08-09 ASSESSMENT — ENCOUNTER SYMPTOMS
SHORTNESS OF BREATH: 0
SINUS PAIN: 1
DIARRHEA: 0
COUGH: 0
NAUSEA: 0
CHEST TIGHTNESS: 0
CONSTIPATION: 0
ABDOMINAL PAIN: 0
VOMITING: 0
BACK PAIN: 0
EYE PAIN: 0

## 2020-08-09 ASSESSMENT — PAIN DESCRIPTION - ORIENTATION: ORIENTATION: LOWER

## 2020-08-09 ASSESSMENT — PAIN - FUNCTIONAL ASSESSMENT: PAIN_FUNCTIONAL_ASSESSMENT: ACTIVITIES ARE NOT PREVENTED

## 2020-08-09 ASSESSMENT — PAIN DESCRIPTION - PROGRESSION: CLINICAL_PROGRESSION: GRADUALLY WORSENING

## 2020-08-09 ASSESSMENT — PAIN DESCRIPTION - DESCRIPTORS: DESCRIPTORS: ACHING

## 2020-08-09 ASSESSMENT — PAIN DESCRIPTION - PAIN TYPE: TYPE: ACUTE PAIN

## 2020-08-09 ASSESSMENT — PAIN DESCRIPTION - ONSET: ONSET: AWAKENED FROM SLEEP

## 2020-08-09 ASSESSMENT — PAIN DESCRIPTION - DIRECTION: RADIATING_TOWARDS: NO

## 2020-08-09 ASSESSMENT — PAIN SCALES - GENERAL
PAINLEVEL_OUTOF10: 3
PAINLEVEL_OUTOF10: 0

## 2020-08-09 ASSESSMENT — PAIN DESCRIPTION - LOCATION: LOCATION: BACK

## 2020-08-09 ASSESSMENT — PAIN DESCRIPTION - FREQUENCY: FREQUENCY: INTERMITTENT

## 2020-08-09 NOTE — ED NOTES
ED nurse-to-nurse bedside report    Chief Complaint   Patient presents with    Extremity Weakness     left hand, x1 week    Illness    Gait Problem     x1 week    Headache    Ear Drainage      LOC: alert and orientated to name, place, date  Vital signs   Vitals:    08/09/20 1355 08/09/20 1525 08/09/20 1819   BP: (!) 143/76 (!) 150/75 (!) 147/74   Pulse: 80 66 69   Resp: 16 18 14   TempSrc: Oral     SpO2:  99%       Pain:    Pain Interventions: n/a  Pain Goal: 2/10  Oxygen: No    Current needs required planning for admission    Telemetry: Yes  LDAs:   Peripheral IV 08/09/20 Left Wrist (Active)   Line Status Normal saline locked 08/09/20 1526     Continuous Infusions:   Mobility: Requires assistance * 1  [place] Fall Risk Score: No flowsheet data found. Fall Interventions: side rails x2  Report given to: Kadie GROVES at bedside.       Gail Murrell RN  08/09/20 3190

## 2020-08-09 NOTE — H&P
History & Physical       Patient: Arin Patterson  YOB: 1955    MRN: [id number]     Acct: [de-identified]    PCP: KATIE Solorzano CNP    Date of Admission: 8/9/2020    Date of Service: Patient seen / examined on 08/09/20 and admitted to outpatient with expected LOS less than two midnights due to medical therapy. ASSESSMENT / PLAN:    LUE/LLE weakness, gait instability  L-sided weakness x 2-3 weeks / gait instability x 1 day. No other associated symptoms. CT head demonstrating slight subcortical white matter hypoattenuation within the R insular region (similar in appearance to previous examination / ?sequela from prior remote insult). CTA head/neck: NAP. *Of note, patient did complete MRI brain WO contrast 7/15/20 (for recent headaches) which showed NAP and minimal chronic microvascular angiopathy. Per chart review, does have history of orthostatic hypotension and postural dizziness with presyncope. No neurologic deficits identified on exam. Remains hemodynamically and neurologically appropriate on RA. Rule out CVA. Admit to neuro stepdown unit. Routine neuro checks. Up with assistance / fall precautions. MRI brain WO contrast, TTE, lipid panel, A1C, TSH and homocysteine level pended for the AM. Daily antiplatelet therapy, high-intensity statin therapy resumed. Allow permissive HTN overnight pending MRI results in the AM. Neurology consulted for additional recommendations. PT/OT eval.    Acute on chronic HTN  Permissive HTN as above. Resume home antihypertensives when indicated. Monitor BP. Hyperlipidemia, CAD (s/p stenting 6/2018)  Remains on outpatient DAPT / resumed. Statin resumed. Chronic diastolic CHF (EF 59%, H0LY per ECHO 3/2018)  Compensated. No outpatient diuretic therapies listed. Monitor I/O, daily weights. Mild normocytic anemia  New since 12/2019. No history of CKD. No active bleeding assessed.  Iron studies added to repeat CBC in the AM.    Mild leukopenia  Etiology unclear. ? Reactive. Will trend. Hypocalcemia  Check iCa to confirm. Replace as needed. History of thyroid cancer (s/p total thyroidectomy)  Synthroid resumed. TSH pending. Chronic headaches  Topamax resumed. Hepatitis A  Historical / diagnosed 18 years ago. Noted. Chief Complaint: extremity weakness, gait instability    History of Present Illness:  73 y/o R-handed male PMHx HTN, HLD, CAD (s/p stenting 2018), chronic diastolic CHF (Ef 14%, Z2QG per ECHO 3/2018), thyroid ca (s/p total thyroidectomy; on synthroid) -- presents to [facility] with chief complaints of extremity weakness and gait instability. History provided by the patient and his wife (present at bedside). Patient presents with complaints of LUE/LLE weakness x 2-3 weeks, with new gait instability first noticed this morning by his wife / states the patient attempted to ambulate and veered off to the left / no fall. She also reports chronic history of intermittent bloody drainage from his ears (no previous workup / additional details unclear). Patient admits to feeling weak and off-balance recently, but is unable to characterize these symptoms further. Endorses history of chronic headaches (unchanged in frequency/severity from baseline; takes topamax). Denies fever/chills, lightheadedness/dizziness, vision/hearing changes, chest pain, palpitations, cough, shortness of breath, n/v/d/c, abdominal pain, paresthesias or syncope. Denies similar symptoms in the past. Denies known history of stroke. Endorses good compliance with daily medications. He is a never smoker. Denies alcohol or illicit drug use. Denies recent illness, travel, trauma or sick contacts. No additional questions / concerns identified at this time. ED course: afebrile, mildly hypertensive, HR nml, RR nml with SpO2 99% on RA. Workup revealed: Hgb 13.6 (MCV 87.6), WBC 4.2, Plt 200.  Na 141 / K 3.6 / Cl 110 /  / Ca 8.2, CO2: 21 (AG 10) / Cr 1.0, BUN 22 (eGFR 75). Trop negative. COVID negative. EKG: NSR / unchanged from previous. CT head WO contrast: NAP / slight subcortical white matter hypoattenuation within the R insular region / appears similar to the previous examination -- may represent sequela from prior remote insult. CTA head/neck: NAP. No pharmacologic intervention administered in the ED. Hospitalist service contacted for admission. Please see A&P for additional details. Past Medical History:    Diagnosis Date    Arthritis     Coronary artery disease involving native coronary artery of native heart without angina pectoris 7/10/2018    Hyperlipidemia     Hypertension     Liver disease     Hepatitis A 18 years ago    Malignant neoplasm of thyroid gland ([facility] [address] 3/15/2019     Past Surgical History:    Procedure Laterality Date    CARPAL TUNNEL RELEASE      CHOLECYSTECTOMY      HERNIA REPAIR      THYROIDECTOMY      TONSILLECTOMY AND ADENOIDECTOMY      TOTAL KNEE ARTHROPLASTY Left       Medications Prior to Admission:   Medication Sig    topiramate (TOPAMAX) 50 MG tablet TAKE 1 TABLET BY MOUTH EVERY NIGHT    atorvastatin (LIPITOR) 40 MG tablet TAKE 1 TABLET BY MOUTH DAILY    metoprolol succinate (TOPROL XL) 25 MG extended release tablet TAKE 1 TABLET BY MOUTH DAILY    clopidogrel (PLAVIX) 75 MG tablet TAKE 1 TABLET BY MOUTH DAILY    aspirin 81 MG chewable tablet CHEW AND SWALLOW 1 TABLET BY MOUTH DAILY    nitroGLYCERIN (NITROSTAT) 0.4 MG SL tablet Take 1 tablet at onset of chest pain, if no relief in 5 min may repeat x 2 then call 911    levothyroxine (SYNTHROID) 125 MCG tablet      Allergies:   Patient has no known allergies.     Social History:   Socioeconomic History    Marital status:      Spouse name: Diandra   Tobacco Use    Smoking status: Never Smoker    Smokeless tobacco: Never Used   Substance and Sexual Activity    Alcohol use: No    Drug use: No     Family History:   Problem Relation Age of Onset    Heart Disease Mother     High Blood Pressure Mother     High Cholesterol Mother     Arthritis Mother    [de-identified] / [place] Mother     Heart Disease Father     Arthritis Father     Cancer Brother         melanoma     REVIEW OF SYSTEMS:  A 14-point ROS was obtained and negative, with the exception of pertinent positives as stated in the HPI. PHYSICAL EXAM:  Vitals:    08/09/20 1355 08/09/20 1525 08/09/20 1819   BP: (!) 143/76 (!) 150/75 (!) 147/74   Pulse: 80 66 69   Resp: 16 18 14   TempSrc: Oral     SpO2:  99%      General appearance: Alert / well-appearing  male. Cooperative. NAD. HEENT: Normocephalic / atraumatic. PERRL. EOMI. Conjunctivae appear normal.  Neck: Supple. No JVD. Respiratory: Unlabored on RA. CTAB. No wheezes / rales / rhonchi. Cardiovascular: RRR. Normal S1/S2. No murmurs / rubs / gallops. Abdomen: Soft / non-tender / non-distended. BS present. Musculoskeletal: No cyanosis or edema. Skin: Warm / dry. Normal turgor. Neurologic: A/O x 3. Speech is normal. Answers questions appropriately. CN intact. Strength 5/5 to BUE/BLE. Sensation / coordination intact bilaterally. No obvious focal neurologic deficits. Patient was not ambulated at bedside. Psychiatric: Anxious-appearing. Thought content / judgment / insight appear appropriate. Peripheral pulses: +2 bilaterally.     Labs:   Results for orders placed or performed during the hospital encounter of 10/67/68   Basic Metabolic Panel w/ Reflex to MG   Result Value Ref Range    Sodium 141 135 - 145 meq/L    Potassium reflex Magnesium 3.6 3.5 - 5.2 meq/L    Chloride 110 98 - 111 meq/L    CO2 21 (L) 23 - 33 meq/L    Glucose 114 (H) 70 - 108 mg/dL    BUN 22 7 - 22 mg/dL    CREATININE 1.0 0.4 - 1.2 mg/dL    Calcium 8.2 (L) 8.5 - 10.5 mg/dL   CBC Auto Differential   Result Value Ref Range    WBC 4.2 (L) 4.8 - 10.8 thou/mm3    RBC 4.83 4.70 - 6.10 mill/mm3    Hemoglobin 13.6 (L) 14.0 - 18.0 gm/dl    Hematocrit 42.3 42.0 - 52.0 %    MCV 87.6 80.0 - 94.0 fL    MCH 28.2 26.0 - 33.0 pg    MCHC 32.2 32.2 - 35.5 gm/dl    RDW-CV 15.4 (H) 11.5 - 14.5 %    RDW-SD 49.0 (H) 35.0 - 45.0 fL    Platelets 260 684 - 518 thou/mm3    MPV 9.7 9.4 - 12.4 fL    Seg Neutrophils 66.3 %    Lymphocytes 20.8 %    Monocytes 8.6 %    Eosinophils 3.1 %    Basophils 0.7 %    Immature Granulocytes 0.5 %    Segs Absolute 2.8 1.8 - 7.7 thou/mm3    Lymphocytes Absolute 0.9 (L) 1.0 - 4.8 thou/mm3    Monocytes Absolute 0.4 0.4 - 1.3 thou/mm3    Eosinophils Absolute 0.1 0.0 - 0.4 thou/mm3    Basophils Absolute 0.0 0.0 - 0.1 thou/mm3    Immature Grans (Abs) 0.02 0.00 - 0.07 thou/mm3    nRBC 0 /100 wbc   Troponin   Result Value Ref Range    Troponin T < 0.010 ng/ml   Anion Gap   Result Value Ref Range    Anion Gap 10.0 8.0 - 16.0 meq/L   Glomerular Filtration Rate, Estimated   Result Value Ref Range    Est, Glom Filt Rate 75 (A) ml/min/1.73m2   Osmolality   Result Value Ref Range    Osmolality Calc 285.5 275.0 - 300 mOsmol/kg   EKG 12 Lead   Result Value Ref Range    Ventricular Rate 72 BPM    Atrial Rate 72 BPM    P-R Interval 198 ms    QRS Duration 98 ms    Q-T Interval 398 ms    QTc Calculation (Bazett) 435 ms    P Axis 55 degrees    R Axis 61 degrees    T Axis 81 degrees     Narrative & Impression   Normal sinus rhythm  Minimal voltage criteria for LVH, may be normal variant  Septal infarct (cited on or before 30-SEP-2018)  Abnormal ECG  When compared with ECG of 28-DEC-2019 18:46  No significant change was found     Radiology:     Cta Head W Wo Contrast  Result Date: 8/9/2020  PROCEDURE: CTA HEAD W WO CONTRAST, CTA NECK W WO CONTRAST CLINICAL INFORMATION: gait abnormality. Unsteady gait. Left arm weakness. COMPARISON: Head CT 8/9/2020. Brain MRI 7/15/2020. TECHNIQUE: 1 mm axial images were obtained through the head and neck after the fast bolus administration of contrast. A noncontrast localizer was obtained. 3-D reconstructions were performed on a dedicated 3-D workstation.  These include multiplanar MPR images and multiplanar MIP images. Centerline reconstructions were obtained of the carotid systems. Isovue intravenous contrast was given. All CT scans at this facility use dose modulation, iterative reconstruction, and/or weight-based dosing when appropriate to reduce radiation dose to as low as reasonably achievable. FINDINGS: CTA NECK: Aortic arch and branches: There is minimal atherosclerosis of the aortic arch. There is no stenosis of the common origin of innominate artery left common carotid artery. There is mild atherosclerosis of each subclavian artery. There is no stenosis. Right common carotid artery/ICA: There is mild atheromatosis of the right common carotid artery. There is no stenosis. There is minimal atherosclerosis of the right carotid bulb. There is no stenosis. There is no stenosis of the right internal carotid artery. Left common carotid artery/ICA: The left common carotid artery is normal. There is mild calcified atherosclerosis of the left carotid bulb. There is no stenosis. There is no stenosis of the left internal carotid artery. Vertebral arteries: There is mild atheromatosis of the origin of each vertebral artery. There is mild stenosis at the origin of the left vertebral artery. The left vertebral artery is dominant. No other stenoses are noted. CTA HEAD: Internal carotid arteries: Normal. The ophthalmic artery origins are also normal. Middle cerebral arteries: Normal. The proximal branches are also normal. Anterior cerebral arteries: Normal. The proximal branches are normal. There is a normal small anterior communicating artery. Vertebral arteries: The vertebral arteries are normal.  The vertebral arteries are relatively codominant distally. Basilar artery: There is dolichoectasia. There is no stenosis.  Superior cerebellar arteries: Normal. Posterior cerebral arteries: Normal. The proximal branches are also normal. There is a normal posterior communicating artery on the left. No aneurysms, stenoses or occlusions are noted. The superior sagittal sinus, vein of Kayden, internal cerebral veins, straight sinus, transverse sinuses and sigmoid sinuses are patent. Axial source data: There are no suspicious findings in the lung apices. There is no upper mediastinal or cervical adenopathy. There is some very mild mucosal thickening along the floors of the maxillary sinuses. 1. Mild stenosis of both carotid bulbs. There is no associated stenosis. 2. No intracranial stenoses or occlusions. **This report has been created using voice recognition software. It may contain minor errors which are inherent in voice recognition technology. ** Final report electronically signed by Dr. Cassius Fernandes on 8/9/2020 4:56 PM    Xr Chest (2 Vw)  Result Date: 8/9/2020  PROCEDURE: PA and lateral chest x-ray CLINICAL INFORMATION: gait abnormality, hx of NSTEMI COMPARISON: No prior study. TECHNIQUE: PA and lateral views of the chest were obtained. FINDINGS: The cardiomediastinal silhouette appears within normal limits. No focal consolidation, pleural effusion or pneumothorax is seen. No acute osseous abnormalities are demonstrated. There is a calcified granuloma in the left suprahilar region. There is mild S-shaped scoliotic curvature of the thoracic spine. 1. No acute cardiopulmonary disease. **This report has been created using voice recognition software. It may contain minor errors which are inherent in voice recognition technology. ** Final report electronically signed by Dr. Tiffany Whitmore on 8/9/2020 3:53 PM    Ct Head Wo Contrast  Result Date: 8/9/2020  CT SCAN OF THE BRAIN WITHOUT CONTRAST CLINICAL INFORMATION: Left arm weakness and unsteady gait.  COMPARISON: 12/28/2019 TECHNIQUE:  Scans were obtained from the base of the skull to the vertex without IV contrast. All CT scans at this facility use dose modulation, iterative reconstruction, and/or weight-based dosing when appropriate to reduce radiation dose to as low as reasonably achievable. FINDINGS:  No acute intracranial hemorrhage or mass effect is seen. There is no midline shift. The lateral ventricles and cerebral sulci appear normal in size and configuration. Slight subcortical white matter hypoattenuation within the right insular region. However this appears similar to the previous examination and may represent sequela from prior remote insult. Further characterization could be obtained with MRI as clinically indicated. The basal cisterns appear within normal limits. The posterior fossa appears normal. No extra-axial fluid collections are seen. The visualized globes and orbits appear grossly intact. No abnormalities of the calvarium are identified. The visualized paranasal sinuses and mastoid air cells appear clear. There is tortuosity of the vertebrobasilar system. 1. No acute intracranial hemorrhage. 2. Slight subcortical white matter hypoattenuation within the right insular region. However this appears similar to the previous examination and may represent sequela from prior remote insult. Further characterization could be obtained with MRI as clinically indicated. **This report has been created using voice recognition software. It may contain minor errors which are inherent in voice recognition technology. ** Final report electronically signed by Dr. Lily Wagner on 8/9/2020 3:50 PM    Cta Neck W Wo Contrast  Result Date: 8/9/2020  PROCEDURE: CTA HEAD W WO CONTRAST, CTA NECK W WO CONTRAST CLINICAL INFORMATION: gait abnormality. Unsteady gait. Left arm weakness. COMPARISON: Head CT 8/9/2020. Brain MRI 7/15/2020. TECHNIQUE: 1 mm axial images were obtained through the head and neck after the fast bolus administration of contrast. A noncontrast localizer was obtained. 3-D reconstructions were performed on a dedicated 3-D workstation. These include multiplanar MPR images and multiplanar MIP images.   Centerline reconstructions were obtained of the carotid systems. Isovue intravenous contrast was given. All CT scans at this facility use dose modulation, iterative reconstruction, and/or weight-based dosing when appropriate to reduce radiation dose to as low as reasonably achievable. FINDINGS: CTA NECK: Aortic arch and branches: There is minimal atherosclerosis of the aortic arch. There is no stenosis of the common origin of innominate artery left common carotid artery. There is mild atherosclerosis of each subclavian artery. There is no stenosis. Right common carotid artery/ICA: There is mild atheromatosis of the right common carotid artery. There is no stenosis. There is minimal atherosclerosis of the right carotid bulb. There is no stenosis. There is no stenosis of the right internal carotid artery. Left common carotid artery/ICA: The left common carotid artery is normal. There is mild calcified atherosclerosis of the left carotid bulb. There is no stenosis. There is no stenosis of the left internal carotid artery. Vertebral arteries: There is mild atheromatosis of the origin of each vertebral artery. There is mild stenosis at the origin of the left vertebral artery. The left vertebral artery is dominant. No other stenoses are noted. CTA HEAD: Internal carotid arteries: Normal. The ophthalmic artery origins are also normal. Middle cerebral arteries: Normal. The proximal branches are also normal. Anterior cerebral arteries: Normal. The proximal branches are normal. There is a normal small anterior communicating artery. Vertebral arteries: The vertebral arteries are normal.  The vertebral arteries are relatively codominant distally. Basilar artery: There is dolichoectasia. There is no stenosis. Superior cerebellar arteries: Normal. Posterior cerebral arteries: Normal. The proximal branches are also normal. There is a normal posterior communicating artery on the left. No aneurysms, stenoses or occlusions are noted.  The superior sagittal sinus, vein of Kayden, internal cerebral veins, straight sinus, transverse sinuses and sigmoid sinuses are patent. Axial source data: There are no suspicious findings in the lung apices. There is no upper mediastinal or cervical adenopathy. There is some very mild mucosal thickening along the floors of the maxillary sinuses. 1. Mild stenosis of both carotid bulbs. There is no associated stenosis. 2. No intracranial stenoses or occlusions. **This report has been created using voice recognition software. It may contain minor errors which are inherent in voice recognition technology. ** Final report electronically signed by Dr. Patricia Pineda on 8/9/2020 4:56 PM    Mri Brain Wo Contrast  Result Date: 7/15/2020  PROCEDURE: MRI BRAIN WO CONTRAST INDICATION:  New onset of headaches after age 48, Migraine with aura and without status migrainosus, not intractable, Malignant neoplasm of thyroid gland ([address] Severe intermittent headaches for several months. History of thyroid cancer. COMPARISON: CT head dated 12/28/2019. TECHNIQUE: Multiplanar and multiple spin echo MRI images were obtained of the brain without contrast. FINDINGS: Ventricles, cisterns and sulci are symmetric and mildly prominent, commensurate with age. There are minimal scattered focal areas of T2/FLAIR prolongation in the subcortical deep white matter. No intra or extra-axil mass is identified. No focal areas of  restricted diffusion are present. The major vascular flow voids appear patent. Orbits are unremarkable. There is mild mucosal thickening in the maxillary sinuses. Mastoid air cells are clear. 1. No evidence of acute intracranial abnormality. 2. Minimal chronic microvascular angiopathy. **This report has been created using voice recognition software. It may contain minor errors which are inherent in voicae recognition technology. ** Final report electronically signed by Dr. Deandre Katz MD on 7/15/2020 5:01 PM    CODE STATUS: FULL    Thank you Tri Looney APRN - CNP for the opportunity to be involved in this patient's care. Electronically signed by Cris Page MD on 8/9/2020.

## 2020-08-09 NOTE — ED TRIAGE NOTES
Patient presents with multiple complaints. Patient states he has been having hand weakness for about a week. States he was at his camper this weekend. States he had \"large amounts of blood\" out of his ear last night. States this morning he was \"limping\" but didn't know why.

## 2020-08-09 NOTE — ED PROVIDER NOTES
[address] [address]          Pt Name: Oliverio Butler  MRN: [id number]  [place] 1955  Date of evaluation: 8/9/2020  Treating Resident Physician: Misty Mojica MD  Supervising Physician: Dr. Amanda Mallory, [facility]       Chief Complaint   Patient presents with    Extremity Weakness     left hand, x1 week    Illness    Gait Problem     x1 week    Headache    Ear Drainage     History obtained from the patient. And family member at bedside      [facility]  Oliverio Butler is a 72 y.o. male who presents to the emergency department for evaluation of double complaints including generalized fatigue and gait abnormality. Patient's family member at bedside states she noted a gait abnormality this morning. Patient states he has left hand and left leg weakness for a couple of weeks. Patient also states he has had left shoulder pain while laying on it. Patient also mentions a frontal headache but he states is due to sinus pain patient and family member also mention left ear bloody drainage has occurred before multiple times. Patient also mentions a generalized fatigue. Patient denies any falls. Patient denies fever chills sweating chest pain shortness of breath abdominal pain nausea vomiting diarrhea constipation neck pain vision changes    Patient has a history of thyroid cancer with lymph node removal MI  Patient denies hypertension diabetes  Patient denies smoking  Patient denies drinking alcohol  Patient denies illegal recreational drugs    Patient mentions being on Plavix and aspirin      The patient has no other acute complaints at this time. REVIEW OF SYSTEMS   Review of Systems   Constitutional: Positive for fatigue. Negative for chills, diaphoresis and fever. HENT: Positive for ear discharge and sinus pain. Negative for ear pain. Eyes: Negative for pain and visual disturbance. Respiratory: Negative for cough, chest tightness and shortness of breath. Cardiovascular: Negative for chest pain, palpitations and leg swelling. Gastrointestinal: Negative for abdominal pain, constipation, diarrhea, nausea and vomiting. Genitourinary: Negative for dysuria. Musculoskeletal: Negative for back pain and neck pain. Skin: Negative for wound. Neurological: Positive for headaches. Negative for syncope. Psychiatric/Behavioral: Negative for confusion.          PAST MEDICAL AND SURGICAL HISTORY     Past Medical History:   Diagnosis Date    Arthritis     Coronary artery disease involving native coronary artery of native heart without angina pectoris 7/10/2018    Hyperlipidemia     Hypertension     Liver disease     Hep A 18 years ago    Malignant neoplasm of thyroid gland ([facility] [address] 3/15/2019     Past Surgical History:   Procedure Laterality Date    CARPAL TUNNEL RELEASE      CHOLECYSTECTOMY      HERNIA REPAIR      THYROIDECTOMY      TONSILLECTOMY AND ADENOIDECTOMY      TOTAL KNEE ARTHROPLASTY Left          MEDICATIONS     Current Facility-Administered Medications:     [START ON 8/10/2020] aspirin chewable tablet 81 mg, 81 mg, Oral, Daily, Dorie Mchugh MD    [START ON 8/10/2020] atorvastatin (LIPITOR) tablet 40 mg, 40 mg, Oral, Daily, Dorie Mchugh MD    [START ON 8/10/2020] clopidogrel (PLAVIX) tablet 75 mg, 75 mg, Oral, Daily, Dorie Mchugh MD    [START ON 8/10/2020] levothyroxine (SYNTHROID) tablet 125 mcg, 125 mcg, Oral, Daily, Dorie Mchugh MD    topiramate (TOPAMAX) tablet 50 mg, 50 mg, Oral, Nightly, Dorie Mchugh MD, 50 mg at 08/09/20 2202    sodium chloride flush 0.9 % injection 10 mL, 10 mL, Intravenous, 2 times per day, Dorie Mchugh MD, 10 mL at 08/09/20 2202    sodium chloride flush 0.9 % injection 10 mL, 10 mL, Intravenous, PRN, Dorie Mchugh MD    acetaminophen (TYLENOL) tablet 650 mg, 650 mg, Oral, Q6H PRN **OR** acetaminophen (TYLENOL) suppository 650 mg, 650 mg, Rectal, Q6H PRN, Dorie Mchugh MD    polyethylene glycol (GLYCOLAX) packet 17 g, 17 g, Oral, Daily PRN, Dorie Mchugh MD    promethazine (PHENERGAN) tablet 12.5 mg, 12.5 mg, Oral, Q6H PRN **OR** ondansetron (ZOFRAN) injection 4 mg, 4 mg, Intravenous, Q6H PRN, Dorie Mchugh MD    [START ON 8/10/2020] enoxaparin (LOVENOX) injection 40 mg, 40 mg, Subcutaneous, Daily, Dorie Mchugh MD      SOCIAL HISTORY     Social History     Social History Narrative    Not on file     Social History     Tobacco Use    Smoking status: Never Smoker    Smokeless tobacco: Never Used   Substance Use Topics    Alcohol use: No    Drug use: No         ALLERGIES   No Known Allergies      FAMILY HISTORY     Family History   Problem Relation Age of Onset    Heart Disease Mother     High Blood Pressure Mother     High Cholesterol Mother     Arthritis Mother     Miscarriages / Stillbirths Mother     Heart Disease Father     Arthritis Father     Cancer Brother         melanoma         PREVIOUS RECORDS   Previous records reviewed: Patient was seen 5/29/2020 for coronary artery disease in the hospital.        PHYSICAL EXAM     ED Triage Vitals [08/09/20 1355]   BP Temp Temp Source Pulse Resp SpO2 Height Weight   (!) 143/76 -- Oral 80 16 -- -- --     Initial vital signs and nursing assessment reviewed and abnormal from Hypertensive. Pulsoximetry is normal per my interpretation. Additional Vital Signs:  Vitals:    08/09/20 2118   BP: (!) 185/95   Pulse: 69   Resp: 20   Temp: 98.1 °F (36.7 °C)   SpO2: 96%       Physical Exam  Constitutional:       General: He is not in acute distress. Appearance: Normal appearance. He is obese. He is not ill-appearing, toxic-appearing or diaphoretic. HENT:      Head: Normocephalic and atraumatic. Right Ear: Tympanic membrane, ear canal and external ear normal. There is no impacted cerumen.       Left Ear: Tympanic membrane and external ear normal.      Ears:      Comments: Wound with dried blood proximal to left tympanic membrane  Eyes:      General: No scleral icterus. Right eye: No discharge. Left eye: No discharge. Extraocular Movements: Extraocular movements intact. Conjunctiva/sclera: Conjunctivae normal.      Pupils: Pupils are equal, round, and reactive to light. Neck:      Musculoskeletal: Normal range of motion and neck supple. No neck rigidity or muscular tenderness. Cardiovascular:      Rate and Rhythm: Normal rate and regular rhythm. Pulses: Normal pulses. Heart sounds: Normal heart sounds. No murmur. No friction rub. No gallop. Pulmonary:      Effort: Pulmonary effort is normal. No respiratory distress. Breath sounds: Normal breath sounds. No wheezing or rales. Chest:      Chest wall: No tenderness. Abdominal:      General: Abdomen is flat. Bowel sounds are normal. There is no distension. Palpations: Abdomen is soft. Tenderness: There is no abdominal tenderness. There is no right CVA tenderness, left CVA tenderness, guarding or rebound. Musculoskeletal: Normal range of motion. General: No swelling or tenderness. Right lower leg: No edema. Left lower leg: No edema. Skin:     General: Skin is warm and dry. Neurological:      General: No focal deficit present. Mental Status: He is alert and oriented to person, place, and time. Mental status is at baseline. Cranial Nerves: No cranial nerve deficit. Sensory: No sensory deficit. Motor: No weakness. Comments: Bilateral facial sensation intact  Bilateral upper extremity sensation and motor intact  Bilateral lower extremity sensation and motor intact   Psychiatric:         Mood and Affect: Mood normal.         Behavior: Behavior normal.         Thought Content:  Thought content normal.         Judgment: Judgment normal.             MEDICAL DECISION MAKING Initial Assessment: Generalized weakness versus acute CVA versus subacute CVA versus previous CVA  Plan:     Labs  Imaging  EKG  CT possible previous CVA recommended MRI  Admission            ED RESULTS   Laboratory results:  Labs Reviewed   BASIC METABOLIC PANEL W/ REFLEX TO MG FOR LOW K - Abnormal; Notable for the following components:       Result Value    CO2 21 (*)     Glucose 114 (*)     Calcium 8.2 (*)     All other components within normal limits   CBC WITH AUTO DIFFERENTIAL - Abnormal; Notable for the following components:    WBC 4.2 (*)     Hemoglobin 13.6 (*)     RDW-CV 15.4 (*)     RDW-SD 49.0 (*)     Lymphocytes Absolute 0.9 (*)     All other components within normal limits   GLOMERULAR FILTRATION RATE, ESTIMATED - Abnormal; Notable for the following components:    Est, Glom Filt Rate 75 (*)     All other components within normal limits   TROPONIN   ANION GAP   OSMOLALITY   COVID-19   URINALYSIS   TSH WITH REFLEX   HOMOCYSTEINE, SERUM   HEMOGLOBIN A1C   LIPID PANEL   CBC   BASIC METABOLIC PANEL W/ REFLEX TO MG FOR LOW K       Radiologic studies results:  CTA HEAD W WO CONTRAST   Final Result       1. Mild stenosis of both carotid bulbs. There is no associated stenosis. 2. No intracranial stenoses or occlusions. **This report has been created using voice recognition software. It may contain minor errors which are inherent in voice recognition technology. **      Final report electronically signed by Dr. Vikash Cat on 8/9/2020 4:56 PM      CTA [id number]   Final Result       1. Mild stenosis of both carotid bulbs. There is no associated stenosis. 2. No intracranial stenoses or occlusions. **This report has been created using voice recognition software. It may contain minor errors which are inherent in voice recognition technology. **      Final report electronically signed by Dr. Vikash Cat on 8/9/2020 4:56 PM      CT Head WO Contrast   Final Result   1.  No acute intracranial hemorrhage. 2. Slight subcortical white matter hypoattenuation within the right insular region. However this appears similar to the previous examination and may represent sequela from prior remote insult. Further characterization could be obtained with MRI as    clinically indicated. **This report has been created using voice recognition software. It may contain minor errors which are inherent in voice recognition technology. **         Final report electronically signed by Dr. Melisa Medel on 8/9/2020 3:50 PM      XR CHEST (2 VW)   Final Result   1. No acute cardiopulmonary disease. **This report has been created using voice recognition software. It may contain minor errors which are inherent in voice recognition technology. **      Final report electronically signed by Dr. Melisa Medel on 8/9/2020 3:53 PM      MRI BRAIN WO CONTRAST    (Results Pending)       ED Medications administered this visit:   Medications   aspirin chewable tablet 81 mg (has no administration in time range)   atorvastatin (LIPITOR) tablet 40 mg (has no administration in time range)   clopidogrel (PLAVIX) tablet 75 mg (has no administration in time range)   levothyroxine (SYNTHROID) tablet 125 mcg (has no administration in time range)   topiramate (TOPAMAX) tablet 50 mg (50 mg Oral Given 8/9/20 2202)   sodium chloride flush 0.9 % injection 10 mL (10 mLs Intravenous Given 8/9/20 2202)   sodium chloride flush 0.9 % injection 10 mL (has no administration in time range)   acetaminophen (TYLENOL) tablet 650 mg (has no administration in time range)     Or   acetaminophen (TYLENOL) suppository 650 mg (has no administration in time range)   polyethylene glycol (GLYCOLAX) packet 17 g (has no administration in time range)   promethazine (PHENERGAN) tablet 12.5 mg (has no administration in time range)     Or   ondansetron (ZOFRAN) injection 4 mg (has no administration in time range)   enoxaparin (LOVENOX) injection

## 2020-08-09 NOTE — ED NOTES
Hand grasp, pedal push and pull are strong and equal bilaterally.  NIH of zero at this time      Ifrah Ladd RN  08/09/20 0198

## 2020-08-10 ENCOUNTER — APPOINTMENT (OUTPATIENT)
Dept: MRI IMAGING | Age: 65
End: 2020-08-10
Payer: MEDICARE

## 2020-08-10 ENCOUNTER — APPOINTMENT (OUTPATIENT)
Dept: CT IMAGING | Age: 65
End: 2020-08-10
Payer: MEDICARE

## 2020-08-10 ENCOUNTER — TELEPHONE (OUTPATIENT)
Dept: FAMILY MEDICINE CLINIC | Age: 65
End: 2020-08-10

## 2020-08-10 VITALS
RESPIRATION RATE: 18 BRPM | DIASTOLIC BLOOD PRESSURE: 72 MMHG | TEMPERATURE: 98 F | WEIGHT: 191.2 LBS | HEIGHT: 69 IN | OXYGEN SATURATION: 95 % | HEART RATE: 75 BPM | SYSTOLIC BLOOD PRESSURE: 114 MMHG | BODY MASS INDEX: 28.32 KG/M2

## 2020-08-10 LAB
ANION GAP SERPL CALCULATED.3IONS-SCNC: 8 MEQ/L (ref 8–16)
AVERAGE GLUCOSE: 126 MG/DL (ref 70–126)
BACTERIA: ABNORMAL
BILIRUBIN URINE: NEGATIVE
BLOOD, URINE: ABNORMAL
BUN BLDV-MCNC: 15 MG/DL (ref 7–22)
CALCIUM SERPL-MCNC: 8 MG/DL (ref 8.5–10.5)
CASTS: ABNORMAL /LPF
CASTS: ABNORMAL /LPF
CHARACTER, URINE: CLEAR
CHLORIDE BLD-SCNC: 112 MEQ/L (ref 98–111)
CHOLESTEROL, TOTAL: 130 MG/DL (ref 100–199)
CO2: 20 MEQ/L (ref 23–33)
COLOR: YELLOW
CREAT SERPL-MCNC: 1 MG/DL (ref 0.4–1.2)
CRYSTALS: ABNORMAL
EPITHELIAL CELLS, UA: ABNORMAL /HPF
ERYTHROCYTE [DISTWIDTH] IN BLOOD BY AUTOMATED COUNT: 15.5 % (ref 11.5–14.5)
ERYTHROCYTE [DISTWIDTH] IN BLOOD BY AUTOMATED COUNT: 49.3 FL (ref 35–45)
FERRITIN: 89 NG/ML (ref 22–322)
GFR SERPL CREATININE-BSD FRML MDRD: 75 ML/MIN/1.73M2
GLUCOSE BLD-MCNC: 111 MG/DL (ref 70–108)
GLUCOSE, URINE: NEGATIVE MG/DL
HBA1C MFR BLD: 6.2 % (ref 4.4–6.4)
HCT VFR BLD CALC: 43.9 % (ref 42–52)
HDLC SERPL-MCNC: 46 MG/DL
HEMOGLOBIN: 14.1 GM/DL (ref 14–18)
HOMOCYSTEINE: 8.2 UMOL/L
IRON SATURATION: 24 % (ref 20–50)
IRON: 53 UG/DL (ref 65–195)
KETONES, URINE: NEGATIVE
LDL CHOLESTEROL CALCULATED: 56 MG/DL
LEUKOCYTE ESTERASE, URINE: NEGATIVE
LV EF: 55 %
LVEF MODALITY: NORMAL
MAGNESIUM: 2.2 MG/DL (ref 1.6–2.4)
MCH RBC QN AUTO: 27.9 PG (ref 26–33)
MCHC RBC AUTO-ENTMCNC: 32.1 GM/DL (ref 32.2–35.5)
MCV RBC AUTO: 86.8 FL (ref 80–94)
MISCELLANEOUS LAB TEST RESULT: ABNORMAL
NITRITE, URINE: NEGATIVE
PH UA: 6.5 (ref 5–9)
PHOSPHORUS: 3.1 MG/DL (ref 2.4–4.7)
PLATELET # BLD: 211 THOU/MM3 (ref 130–400)
PMV BLD AUTO: 10 FL (ref 9.4–12.4)
POTASSIUM REFLEX MAGNESIUM: 3.9 MEQ/L (ref 3.5–5.2)
PROTEIN UA: NEGATIVE MG/DL
RBC # BLD: 5.06 MILL/MM3 (ref 4.7–6.1)
RBC URINE: ABNORMAL /HPF
RENAL EPITHELIAL, UA: ABNORMAL
SODIUM BLD-SCNC: 140 MEQ/L (ref 135–145)
SPECIFIC GRAVITY UA: 1.02 (ref 1–1.03)
T4 FREE: 1.39 NG/DL (ref 0.93–1.76)
TOTAL IRON BINDING CAPACITY: 221 UG/DL (ref 171–450)
TRIGL SERPL-MCNC: 138 MG/DL (ref 0–199)
TSH SERPL DL<=0.05 MIU/L-ACNC: 0.28 UIU/ML (ref 0.4–4.2)
UROBILINOGEN, URINE: 1 EU/DL (ref 0–1)
WBC # BLD: 6.1 THOU/MM3 (ref 4.8–10.8)
WBC UA: ABNORMAL /HPF
YEAST: ABNORMAL

## 2020-08-10 PROCEDURE — 70551 MRI BRAIN STEM W/O DYE: CPT

## 2020-08-10 PROCEDURE — 99218 PR INITIAL OBSERVATION CARE/DAY 30 MINUTES: CPT | Performed by: PSYCHIATRY & NEUROLOGY

## 2020-08-10 PROCEDURE — 36415 COLL VENOUS BLD VENIPUNCTURE: CPT

## 2020-08-10 PROCEDURE — 99217 PR OBSERVATION CARE DISCHARGE MANAGEMENT: CPT | Performed by: INTERNAL MEDICINE

## 2020-08-10 PROCEDURE — 2580000003 HC RX 258: Performed by: FAMILY MEDICINE

## 2020-08-10 PROCEDURE — 93306 TTE W/DOPPLER COMPLETE: CPT

## 2020-08-10 PROCEDURE — 96372 THER/PROPH/DIAG INJ SC/IM: CPT

## 2020-08-10 PROCEDURE — 97163 PT EVAL HIGH COMPLEX 45 MIN: CPT

## 2020-08-10 PROCEDURE — 84439 ASSAY OF FREE THYROXINE: CPT

## 2020-08-10 PROCEDURE — 97165 OT EVAL LOW COMPLEX 30 MIN: CPT

## 2020-08-10 PROCEDURE — 84100 ASSAY OF PHOSPHORUS: CPT

## 2020-08-10 PROCEDURE — 83090 ASSAY OF HOMOCYSTEINE: CPT

## 2020-08-10 PROCEDURE — 80061 LIPID PANEL: CPT

## 2020-08-10 PROCEDURE — 97530 THERAPEUTIC ACTIVITIES: CPT

## 2020-08-10 PROCEDURE — 94760 N-INVAS EAR/PLS OXIMETRY 1: CPT

## 2020-08-10 PROCEDURE — 83735 ASSAY OF MAGNESIUM: CPT

## 2020-08-10 PROCEDURE — 85027 COMPLETE CBC AUTOMATED: CPT

## 2020-08-10 PROCEDURE — 72141 MRI NECK SPINE W/O DYE: CPT

## 2020-08-10 PROCEDURE — 80048 BASIC METABOLIC PNL TOTAL CA: CPT

## 2020-08-10 PROCEDURE — G0378 HOSPITAL OBSERVATION PER HR: HCPCS

## 2020-08-10 PROCEDURE — 82728 ASSAY OF FERRITIN: CPT

## 2020-08-10 PROCEDURE — 97116 GAIT TRAINING THERAPY: CPT

## 2020-08-10 PROCEDURE — 84443 ASSAY THYROID STIM HORMONE: CPT

## 2020-08-10 PROCEDURE — 76376 3D RENDER W/INTRP POSTPROCES: CPT

## 2020-08-10 PROCEDURE — 6360000002 HC RX W HCPCS: Performed by: FAMILY MEDICINE

## 2020-08-10 PROCEDURE — 83540 ASSAY OF IRON: CPT

## 2020-08-10 PROCEDURE — 6370000000 HC RX 637 (ALT 250 FOR IP): Performed by: FAMILY MEDICINE

## 2020-08-10 PROCEDURE — 83550 IRON BINDING TEST: CPT

## 2020-08-10 PROCEDURE — 81001 URINALYSIS AUTO W/SCOPE: CPT

## 2020-08-10 PROCEDURE — 83036 HEMOGLOBIN GLYCOSYLATED A1C: CPT

## 2020-08-10 RX ORDER — AMLODIPINE BESYLATE 5 MG/1
5 TABLET ORAL DAILY
Status: DISCONTINUED | OUTPATIENT
Start: 2020-08-10 | End: 2020-08-10 | Stop reason: HOSPADM

## 2020-08-10 RX ORDER — ACETAMINOPHEN 500 MG
500 TABLET ORAL 4 TIMES DAILY PRN
Qty: 360 TABLET | Refills: 1 | Status: SHIPPED | OUTPATIENT
Start: 2020-08-10

## 2020-08-10 RX ADMIN — LEVOTHYROXINE SODIUM 125 MCG: 0.12 TABLET ORAL at 07:04

## 2020-08-10 RX ADMIN — ASPIRIN 81 MG: 81 TABLET, CHEWABLE ORAL at 08:47

## 2020-08-10 RX ADMIN — Medication 10 ML: at 08:48

## 2020-08-10 RX ADMIN — ATORVASTATIN CALCIUM 40 MG: 40 TABLET, FILM COATED ORAL at 08:47

## 2020-08-10 RX ADMIN — ACETAMINOPHEN 650 MG: 325 TABLET ORAL at 04:54

## 2020-08-10 RX ADMIN — ENOXAPARIN SODIUM 40 MG: 40 INJECTION SUBCUTANEOUS at 08:48

## 2020-08-10 RX ADMIN — CLOPIDOGREL BISULFATE 75 MG: 75 TABLET ORAL at 08:48

## 2020-08-10 ASSESSMENT — PAIN DESCRIPTION - PROGRESSION: CLINICAL_PROGRESSION: GRADUALLY WORSENING

## 2020-08-10 ASSESSMENT — PAIN DESCRIPTION - PAIN TYPE: TYPE: ACUTE PAIN

## 2020-08-10 ASSESSMENT — PAIN DESCRIPTION - LOCATION: LOCATION: BACK

## 2020-08-10 ASSESSMENT — PAIN - FUNCTIONAL ASSESSMENT: PAIN_FUNCTIONAL_ASSESSMENT: ACTIVITIES ARE NOT PREVENTED

## 2020-08-10 ASSESSMENT — PAIN DESCRIPTION - DIRECTION: RADIATING_TOWARDS: NO

## 2020-08-10 ASSESSMENT — PAIN DESCRIPTION - ONSET: ONSET: ON-GOING

## 2020-08-10 ASSESSMENT — PAIN DESCRIPTION - DESCRIPTORS: DESCRIPTORS: ACHING

## 2020-08-10 ASSESSMENT — PAIN DESCRIPTION - FREQUENCY: FREQUENCY: INTERMITTENT

## 2020-08-10 ASSESSMENT — PAIN SCALES - GENERAL
PAINLEVEL_OUTOF10: 0
PAINLEVEL_OUTOF10: 3
PAINLEVEL_OUTOF10: 0
PAINLEVEL_OUTOF10: 0

## 2020-08-10 ASSESSMENT — PAIN DESCRIPTION - ORIENTATION: ORIENTATION: LOWER

## 2020-08-10 NOTE — DISCHARGE SUMMARY
Hospital Medicine Discharge Summary      Patient:  Arin Patterson  YOB: 1955  MRN: [id number]   PCP: KATIE Solorzano CNP       Acct: [de-identified]     Admit Date: 8/9/2020     Discharge Date:  8/10/2020      Admitting Physician: Arnulfo Hannah MD  Discharge Physician: Narciso Drake MD     Discharge Diagnoses     DISCHARGE DIAGNOSES:  1. Left sided weakness with gait instability, r/o musculoskeletal etiology: Initial CT head showed a subcortical white mattehypoattenuation within the right insular region that was similar to previous CT scans. MRI brain did  not show evidence of a stroke. MRI of cervical spine showed multilevel degenerative changes of the cervical spine most pronounced at C4-C5 with a shallow disc bulge with mild spinal stenosis and moderate right and severe left left neural foraminal stenosis of cervical spine. CTA of head and neck did not show significant stenosis. . Continue aspirin and Plavix. The patient was referred to Dr. All Diop as an outpatient. The case was discussed with Dr. All Diop prior to discharge  2. Left ear hemorrhage: referral to ENT as outpatient  3. HTN: continue home medications  4. Hyperlipidemia  5. Chronic diastolic CHF  6. History of thyroid CA s/p thyroidectomy: Free T4 unremarkable  7. Chronic headaches: On Topamax. Follow up with Neurology as an outpatient. Patient may have tension headaches as well given the findings of ostearthritis of the cervical spine    Disposition:    [x] Home         Condition at Discharge: Stable  and improved    The patient was seen and examined on day of discharge and this discharge summary is in conjunction with any daily progress note from day of discharge. Hospital course     Arin Patterson is a 72 y.o. male 72 y.o. male w/ PMH of HTN who was admitted on 8/9/2020 . Some of the history is provided by the patient's wife.  The patient mentions that on the weekend he was out at his camper when he noticed that his left ear was bleeding. This is not unusual for him and he has experienced some intermittent bleeding from his left ear. He then went for a walk and noticed some gait instability and left sided weakness as well as pain at the bilateral LE. He also mentioned that for the past 2-3 weeks when lifting his grandchildren, he has noticed some LUE weakness. He also mentioned persistent headaches at the base of his skull for which he was prescribed migraine medications. He also feels some \"cracking in his neck: In the ED, CT scan of the head showed an abnormality at the right insular region. MRI did not show any evidence of a stroke. MRI of the cervical spine is pending. The patient was improved at the time of discharge.      Discharge Medications      [facility]   Home Medication Instructions UPF:[id number]    Printed on:08/10/20 0380   Medication Information                      aspirin 81 MG chewable tablet  CHEW AND SWALLOW 1 TABLET BY MOUTH DAILY             atorvastatin (LIPITOR) 40 MG tablet  TAKE 1 TABLET BY MOUTH DAILY             clopidogrel (PLAVIX) 75 MG tablet  TAKE 1 TABLET BY MOUTH DAILY             levothyroxine (SYNTHROID) 125 MCG tablet               metoprolol succinate (TOPROL XL) 25 MG extended release tablet  TAKE 1 TABLET BY MOUTH DAILY             nitroGLYCERIN (NITROSTAT) 0.4 MG SL tablet  Take 1 tablet at onset of chest pain, if no relief in 5 min may repeat x 2 then call 911             topiramate (TOPAMAX) 50 MG tablet  TAKE 1 TABLET BY MOUTH EVERY NIGHT                     Physical Examination     Vitals:  Vitals:    08/10/20 0444 08/10/20 0816 08/10/20 0928 08/10/20 1220   BP: (!) 150/80 (!) 120/57  114/72   Pulse: 62 64  75   Resp: 16 18  18   Temp: 97.6 °F (36.4 °C) 98.1 °F (36.7 °C)  98 °F (36.7 °C)   TempSrc: Oral Oral  Oral   SpO2: 98% 95% 95% 95%   Weight:       Height:           Weight: Weight: 191 lb 3.2 oz (86.7 kg)     24 hour intake/output:    Intake/Output Summary (Last 24 hours) at 8/10/2020 1509  Last data filed at 8/10/2020 1426  Gross per 24 hour   Intake 740 ml   Output --   Net 740 ml       General appearance:  No apparent distress. HEENT: Normocephalic. Extraocular motion intact. Conjunctivae clear. Nose symmetric without evidence of discharge. Oral mucosa moist w/o erythema or exudate. Neck: Supple. No  Trachea midline. No thyromegaly. Cardiovascular:  RRR w/ normal S1/S2. No murmurs, rubs or gallops. Respiratory: Clear to auscultation, bilaterally without rales/wheezes/rhonchi. Abdomen: Soft, non-tender, non-distended with normal bowel sounds. Musculoskeletal:  Full ROM without deformity. Neurologic:Mild left sided weakness at the bilateral upper and lower extremities. Cranial nerves: II-XII intact. Lymphatic: Deferred  Psychiatric:  Alert and oriented. Vascular: Dorsalis pedis pulses bilaterally palpable 2+. Radial pulses palpable bilaterally 2+. No peripheral edema. Capillary refill<3 seconds  Genitourinary: Deferred. Skin: No visible rashes or lesions. Labs     Labs: For convenience and continuity at follow-up the following most recent labs are provided:      Recent Labs     08/09/20  1429 08/10/20  0351   WBC 4.2* 6.1   HGB 13.6* 14.1   HCT 42.3 43.9    211     Recent Labs     08/09/20  1429 08/10/20  0351 08/10/20  0406    140  --    K 3.6 3.9  --     112*  --    CO2 21* 20*  --    BUN 22 15  --    CREATININE 1.0 1.0  --    CALCIUM 8.2* 8.0*  --    PHOS  --   --  3.1     No results for input(s): AST, ALT, BILIDIR, BILITOT, ALKPHOS in the last 72 hours. No results for input(s): INR in the last 72 hours. No results for input(s): Leellen Carota in the last 72 hours.   Renal:    Lab Results   Component Value Date     08/10/2020    K 3.9 08/10/2020     08/10/2020    CO2 20 08/10/2020    BUN 15 08/10/2020    CREATININE 1.0 08/10/2020    CALCIUM 8.0 08/10/2020    PHOS 3.1 08/10/2020           Radiology Radiology:     [facility] W Wo Contrast    Result Date: 8/9/2020  PROCEDURE: CTA HEAD W WO CONTRAST, CTA NECK W WO CONTRAST CLINICAL INFORMATION: gait abnormality. Unsteady gait. Left arm weakness. COMPARISON: Head CT 8/9/2020. Brain MRI 7/15/2020. TECHNIQUE: 1 mm axial images were obtained through the head and neck after the fast bolus administration of contrast. A noncontrast localizer was obtained. 3-D reconstructions were performed on a dedicated 3-D workstation. These include multiplanar MPR images and multiplanar MIP images. Centerline reconstructions were obtained of the carotid systems. Isovue intravenous contrast was given. All CT scans at this facility use dose modulation, iterative reconstruction, and/or weight-based dosing when appropriate to reduce radiation dose to as low as reasonably achievable. FINDINGS: CTA NECK: Aortic arch and branches: There is minimal atherosclerosis of the aortic arch. There is no stenosis of the common origin of innominate artery left common carotid artery. There is mild atherosclerosis of each subclavian artery. There is no stenosis. Right common carotid artery/ICA: There is mild atheromatosis of the right common carotid artery. There is no stenosis. There is minimal atherosclerosis of the right carotid bulb. There is no stenosis. There is no stenosis of the right internal carotid artery. Left common carotid artery/ICA: The left common carotid artery is normal. There is mild calcified atherosclerosis of the left carotid bulb. There is no stenosis. There is no stenosis of the left internal carotid artery. Vertebral arteries: There is mild atheromatosis of the origin of each vertebral artery. There is mild stenosis at the origin of the left vertebral artery. The left vertebral artery is dominant. No other stenoses are noted.  CTA HEAD: Internal carotid arteries: Normal. The ophthalmic artery origins are also normal. Middle cerebral arteries: Normal. The proximal branches are also normal. Anterior cerebral arteries: Normal. The proximal branches are normal. There is a normal small anterior communicating artery. Vertebral arteries: The vertebral arteries are normal.  The vertebral arteries are relatively codominant distally. Basilar artery: There is dolichoectasia. There is no stenosis. Superior cerebellar arteries: Normal. Posterior cerebral arteries: Normal. The proximal branches are also normal. There is a normal posterior communicating artery on the left. No aneurysms, stenoses or occlusions are noted. The superior sagittal sinus, vein of Kayden, internal cerebral veins, straight sinus, transverse sinuses and sigmoid sinuses are patent. Axial source data: There are no suspicious findings in the lung apices. There is no upper mediastinal or cervical adenopathy. There is some very mild mucosal thickening along the floors of the maxillary sinuses. 1. Mild stenosis of both carotid bulbs. There is no associated stenosis. 2. No intracranial stenoses or occlusions. **This report has been created using voice recognition software. It may contain minor errors which are inherent in voice recognition technology. ** Final report electronically signed by Dr. Adair Babinski on 8/9/2020 4:56 PM    Xr Chest (2 Vw)    Result Date: 8/9/2020  PROCEDURE: PA and lateral chest x-ray CLINICAL INFORMATION: gait abnormality, hx of NSTEMI COMPARISON: No prior study. TECHNIQUE: PA and lateral views of the chest were obtained. FINDINGS: The cardiomediastinal silhouette appears within normal limits. No focal consolidation, pleural effusion or pneumothorax is seen. No acute osseous abnormalities are demonstrated. There is a calcified granuloma in the left suprahilar region. There is mild S-shaped scoliotic curvature of the thoracic spine. 1. No acute cardiopulmonary disease. **This report has been created using voice recognition software.   It may contain minor errors which are inherent in voice recognition technology. ** Final report electronically signed by Dr. Linda Rinaldi on 8/9/2020 3:53 PM    Ct Head Wo Contrast    Result Date: 8/9/2020  CT SCAN OF THE BRAIN WITHOUT CONTRAST CLINICAL INFORMATION: Left arm weakness and unsteady gait. COMPARISON: 12/28/2019 TECHNIQUE:  Scans were obtained from the base of the skull to the vertex without IV contrast. All CT scans at this facility use dose modulation, iterative reconstruction, and/or weight-based dosing when appropriate to reduce radiation dose to as low as reasonably achievable. FINDINGS:  No acute intracranial hemorrhage or mass effect is seen. There is no midline shift. The lateral ventricles and cerebral sulci appear normal in size and configuration. Slight subcortical white matter hypoattenuation within the right insular region. However this appears similar to the previous examination and may represent sequela from prior remote insult. Further characterization could be obtained with MRI as clinically indicated. The basal cisterns appear within normal limits. The posterior fossa appears normal. No extra-axial fluid collections are seen. The visualized globes and orbits appear grossly intact. No abnormalities of the calvarium are identified. The visualized paranasal sinuses and mastoid air cells appear clear. There is tortuosity of the vertebrobasilar system. 1. No acute intracranial hemorrhage. 2. Slight subcortical white matter hypoattenuation within the right insular region. However this appears similar to the previous examination and may represent sequela from prior remote insult. Further characterization could be obtained with MRI as clinically indicated. **This report has been created using voice recognition software. It may contain minor errors which are inherent in voice recognition technology. ** Final report electronically signed by Dr. Linda Rinaldi on 8/9/2020 3:50 PM    Cta Neck W Wo Contrast    Result Date: 8/9/2020  PROCEDURE: CTA HEAD Zadie Agent a normal small anterior communicating artery. Vertebral arteries: The vertebral arteries are normal.  The vertebral arteries are relatively codominant distally. Basilar artery: There is dolichoectasia. There is no stenosis. Superior cerebellar arteries: Normal. Posterior cerebral arteries: Normal. The proximal branches are also normal. There is a normal posterior communicating artery on the left. No aneurysms, stenoses or occlusions are noted. The superior sagittal sinus, vein of Kayden, internal cerebral veins, straight sinus, transverse sinuses and sigmoid sinuses are patent. Axial source data: There are no suspicious findings in the lung apices. There is no upper mediastinal or cervical adenopathy. There is some very mild mucosal thickening along the floors of the maxillary sinuses. 1. Mild stenosis of both carotid bulbs. There is no associated stenosis. 2. No intracranial stenoses or occlusions. **This report has been created using voice recognition software. It may contain minor errors which are inherent in voice recognition technology. ** Final report electronically signed by Dr. Gautam Mcdaniel on 8/9/2020 4:56 PM    Mri Brain Wo Contrast    Result Date: 8/10/2020  PROCEDURE: MRI BRAIN WO CONTRAST INDICATION:  R/o CVA. Gait instability and left-sided weakness with pain in the bilateral lower extremities. COMPARISON: CT head dated 10/9/2020 and MR brain dated 7/15/2020. TECHNIQUE: Multiplanar and multiple spin echo MRI images were obtained of the brain without contrast. FINDINGS: Ventricles, cisterns and sulci are symmetric and mildly prominent, commensurate with age. No significant focal areas of abnormal T2/flair prolongation are identified within the parenchyma. No intra or extra-axial mass is identified. No focal areas of restricted diffusion are present. The major vascular flow voids appear patent. Orbits are unremarkable.  There is mild mucosal thickening in the maxillary sinuses, stable compared to prior exam. Mastoid air cells are clear. 1. No intracranial abnormality identified. 2. Mild chronic sinusitis. **This report has been created using voice recognition software. It may contain minor errors which are inherent in voice recognition technology. ** Final report electronically signed by Dr. Ross Wesley MD on 8/10/2020 11:27 AM    Mri Brain Wo Contrast    Result Date: 7/15/2020  PROCEDURE: MRI BRAIN WO CONTRAST INDICATION:  New onset of headaches after age 48, Migraine with aura and without status migrainosus, not intractable, Malignant neoplasm of thyroid gland ([address] Severe intermittent headaches for several months. History of thyroid cancer. COMPARISON: CT head dated 12/28/2019. TECHNIQUE: Multiplanar and multiple spin echo MRI images were obtained of the brain without contrast. FINDINGS: Ventricles, cisterns and sulci are symmetric and mildly prominent, commensurate with age. There are minimal scattered focal areas of T2/FLAIR prolongation in the subcortical deep white matter. No intra or extra-axial mass is identified. No focal areas of  restricted diffusion are present. The major vascular flow voids appear patent. Orbits are unremarkable. There is mild mucosal thickening in the maxillary sinuses. Mastoid air cells are clear. 1. No evidence of acute intracranial abnormality. 2. Minimal chronic microvascular angiopathy. **This report has been created using voice recognition software. It may contain minor errors which are inherent in voice recognition technology. ** Final report electronically signed by Dr. Ross Wesley MD on 7/15/2020 5:01 PM        Consults     IP CONSULT TO NEUROLOGY          Discharge Instructions     Patient Instructions:    Discharge lab work: None   Activity: as tolerated  Diet: DIET GENERAL;      Follow-up visits     KATIE Beasley - CNP  [address] [facility], [address] [place] [address] Rd  [address]    In 1 week  [facility] [address]

## 2020-08-10 NOTE — CARE COORDINATION
8/10/20, 1:54 PM EDT  DISCHARGE PLANNING EVALUATION:    Kenneth Arrieta       Admitted from: ED 8/9/2020/ [address] Nw day: 0   Location: -19/019-A Reason for admit: Gait instability [R26.81] Status: IP  Admit order signed?: yes  PMH:  has a past medical history of Arthritis, Chronic diastolic CHF (congestive heart failure) ([facility] [address] Coronary artery disease involving native coronary artery of native heart without angina pectoris, Hyperlipidemia, Hypertension, Liver disease, and Malignant neoplasm of thyroid gland ([facility] [address] Procedure: ECHO  Pertinent abnormal Imaging:MRI Brain WO Contrast   Impression:              1. No intracranial abnormality identified. 2. Mild chronic sinusitis. CTA Head Neck W WO Contrast    Impression:             1. Mild stenosis of both carotid bulbs. There is no associated stenosis. 2. No intracranial stenoses or occlusions. Medications:  Scheduled Meds:   amLODIPine  5 mg Oral Daily    aspirin  81 mg Oral Daily    atorvastatin  40 mg Oral Daily    clopidogrel  75 mg Oral Daily    levothyroxine  125 mcg Oral Daily    topiramate  50 mg Oral Nightly    sodium chloride flush  10 mL Intravenous 2 times per day    enoxaparin  40 mg Subcutaneous Daily     Continuous Infusions:   Pertinent Info/Orders/Treatment Plan: COVID (-), telemetry, neuro checks, Neurology consult. PT/OT/ST. Diet: DIET GENERAL;   Smoking status:  reports that he has never smoked.  He has never used smokeless tobacco.   PCP: KATIE Soto CNP  Readmission 30 days or less: no   %    Discharge Planning Evaluation  Current Residence:  Private Residence  Living Arrangements:  Family Members, Spouse/Significant Other   Support Systems:  Spouse/Significant Other  Current Services PTA:     Potential Assistance Needed:  N/A  Potential Assistance Purchasing Medications:  No  Does patient want to participate in local refill/ meds to beds program?  Not Assessed  Type of Home Care Services:

## 2020-08-10 NOTE — PROGRESS NOTES
Discharge teaching and instructions for diagnosis/procedure of cervical stenosis completed with patient using teachback method. AVS reviewed. Printed prescriptions given to patient. Patient voiced understanding regarding prescriptions, follow up appointments, and care of self at home. Discharged with all belongings and medications in a wheelchair to  home with support per family.

## 2020-08-10 NOTE — PLAN OF CARE
Problem: Falls - Risk of:  Goal: Will remain free from falls  Description: Will remain free from falls  8/10/2020 0841 by Cj Mancini RN  Outcome: Ongoing  Note: Call light in reach, bed in lowest position, and bed alarm activated. Education given on use of call light before ambulation and when in need of assistance. Patient expressed understanding. Hourly visual checks performed and charted. Toileting offered to patient. No falls this shift, at any time. Arm band and falling star in place. Will continue to monitor. Problem: Falls - Risk of:  Goal: Absence of physical injury  Description: Absence of physical injury  8/10/2020 0841 by Cj Mancini RN  Outcome: Ongoing  Note: Patient remains free of injury this shift. Call light within reach and hourly rounds performed. Problem: Discharge Planning:  Goal: Discharged to appropriate level of care  Description: Discharged to appropriate level of care  8/10/2020 0841 by Cj Mancini RN  Outcome: Ongoing  Note: Pt comes from home with wife, plans to return on discharge. Problem: Pain:  Goal: Pain level will decrease  Description: Pain level will decrease  8/10/2020 0841 by Cj Mancini RN  Outcome: Ongoing  Note: Patient able to use 0-10 pain scale, pain goal of no pain. Denies pain at this time. Agreeable to take PRN pain medications. Problem: Skin Integrity/Risk  Goal: No skin breakdown during hospitalization  8/10/2020 0841 by Cj Mancini RN  Outcome: Ongoing  Note: No signs of new skin breakdown with each assessment. Skin remains warm, dry, intact. Mucous membranes pink & moist. Patient is able to turn self.         Problem: HEMODYNAMIC STATUS  Goal: Patient has stable vital signs and fluid balance  8/10/2020 0841 by Cj Mancini RN  Outcome: Ongoing  Note:   Vitals:    08/09/20 2118 08/10/20 0053 08/10/20 0444 08/10/20 0816   BP: (!) 185/95 119/67 (!) 150/80 (!) 120/57   Pulse: 69 66 62 64   Resp: 20 18 16 18   Temp: 98.1 °F (36.7 °C)  97.6 °F (36.4 °C) 98.1 °F (36.7 °C)   TempSrc: Oral  Oral Oral   SpO2: 96% 93% 98% 95%   Weight: 191 lb 3.2 oz (86.7 kg)      Height:              Problem: ACTIVITY INTOLERANCE/IMPAIRED MOBILITY  Goal: Mobility/activity is maintained at optimum level for patient  8/10/2020 0841 by Jeovany Kan RN  Outcome: Ongoing  Note: Therapies to see today, pt reports some weakness to L side. Problem: COMMUNICATION IMPAIRMENT  Goal: Ability to express needs and understand communication  8/10/2020 0841 by Jeovany Kan RN  Outcome: Ongoing  Note: Pt is alert and oriented x4, able to communicate all needs appropriately. Care plan reviewed with patient. Patient verbalizes understanding of the plan of care and contributed to goal setting.

## 2020-08-10 NOTE — PROGRESS NOTES
showed an abnormality at the right insular region. MRI is pending    8/10: Patient states that he feels better at the moment. He states that he was able to ambulate to the bathroom. I saw him ambulating in the hallway and he was ambulating without the use of a walker      OBJECTIVE     Medications:  Reviewed    Infusion Medications   Scheduled Medications    amLODIPine  5 mg Oral Daily    aspirin  81 mg Oral Daily    atorvastatin  40 mg Oral Daily    clopidogrel  75 mg Oral Daily    levothyroxine  125 mcg Oral Daily    topiramate  50 mg Oral Nightly    sodium chloride flush  10 mL Intravenous 2 times per day    enoxaparin  40 mg Subcutaneous Daily     PRN Meds: sodium chloride flush, acetaminophen **OR** acetaminophen, polyethylene glycol, promethazine **OR** ondansetron    Ins and outs:      Intake/Output Summary (Last 24 hours) at 8/10/2020 1255  Last data filed at 8/10/2020 0848  Gross per 24 hour   Intake 380 ml   Output --   Net 380 ml       Physical Examination     /72   Pulse 75   Temp 98 °F (36.7 °C) (Oral)   Resp 18   Ht 5' 9\" (1.753 m)   Wt 191 lb 3.2 oz (86.7 kg)   SpO2 95%   BMI 28.24 kg/m²     General appearance: No apparent distress. HEENT: Extraocular motion intact. Trachea midline. Neck: Supple. Respiratory:  CTA bilaterally without rales/wheezes/rhonchi. Cardiovascular: RRR with normal S1/S2 without murmurs, rubs or gallops. Abdomen: Soft, non-tender, non-distended with normal bowel sounds. Musculoskeletal: Patient is moving extremities x 4 spontaneously  Neurologic:Left upper extremity weakness, otherwise unremarkable. CN: II-XII intact  Psychiatric: Alert and oriented  Vascular: Dorsalis pedis palpable bilaterally. Radial pulses palpable bilaterally. Skin:  No visible rashes or lesions.     Labs     Recent Labs     08/09/20  1429 08/10/20  0351   WBC 4.2* 6.1   HGB 13.6* 14.1   HCT 42.3 43.9    211     Recent Labs     08/09/20  1429 08/10/20  0351 08/10/20  0406    140  --    K 3.6 3.9  --     112*  --    CO2 21* 20*  --    BUN 22 15  --    CREATININE 1.0 1.0  --    CALCIUM 8.2* 8.0*  --    PHOS  --   --  3.1     No results for input(s): AST, ALT, BILIDIR, BILITOT, ALKPHOS in the last 72 hours. No results for input(s): INR in the last 72 hours. No results for input(s): Helena Ke in the last 72 hours. Urinalysis:      Lab Results   Component Value Date    NITRU NEGATIVE 08/10/2020    WBCUA NONE SEEN 08/10/2020    BACTERIA NONE SEEN 08/10/2020    RBCUA 5-10 08/10/2020    BLOODU TRACE 08/10/2020    SPECGRAV 1.018 08/10/2020    GLUCOSEU 100 12/28/2019       Diagnostic imaging/procedures       Cta Head W Wo Contrast    Result Date: 8/9/2020  PROCEDURE: CTA HEAD W WO CONTRAST, CTA NECK W WO CONTRAST CLINICAL INFORMATION: gait abnormality. Unsteady gait. Left arm weakness. COMPARISON: Head CT 8/9/2020. Brain MRI 7/15/2020. TECHNIQUE: 1 mm axial images were obtained through the head and neck after the fast bolus administration of contrast. A noncontrast localizer was obtained. 3-D reconstructions were performed on a dedicated 3-D workstation. These include multiplanar MPR images and multiplanar MIP images. Centerline reconstructions were obtained of the carotid systems. Isovue intravenous contrast was given. All CT scans at this facility use dose modulation, iterative reconstruction, and/or weight-based dosing when appropriate to reduce radiation dose to as low as reasonably achievable. FINDINGS: CTA NECK: Aortic arch and branches: There is minimal atherosclerosis of the aortic arch. There is no stenosis of the common origin of innominate artery left common carotid artery. There is mild atherosclerosis of each subclavian artery. There is no stenosis. Right common carotid artery/ICA: There is mild atheromatosis of the right common carotid artery. There is no stenosis. There is minimal atherosclerosis of the right carotid bulb.  There is no stenosis. There is no stenosis of the right internal carotid artery. Left common carotid artery/ICA: The left common carotid artery is normal. There is mild calcified atherosclerosis of the left carotid bulb. There is no stenosis. There is no stenosis of the left internal carotid artery. Vertebral arteries: There is mild atheromatosis of the origin of each vertebral artery. There is mild stenosis at the origin of the left vertebral artery. The left vertebral artery is dominant. No other stenoses are noted. CTA HEAD: Internal carotid arteries: Normal. The ophthalmic artery origins are also normal. Middle cerebral arteries: Normal. The proximal branches are also normal. Anterior cerebral arteries: Normal. The proximal branches are normal. There is a normal small anterior communicating artery. Vertebral arteries: The vertebral arteries are normal.  The vertebral arteries are relatively codominant distally. Basilar artery: There is dolichoectasia. There is no stenosis. Superior cerebellar arteries: Normal. Posterior cerebral arteries: Normal. The proximal branches are also normal. There is a normal posterior communicating artery on the left. No aneurysms, stenoses or occlusions are noted. The superior sagittal sinus, vein of Kayden, internal cerebral veins, straight sinus, transverse sinuses and sigmoid sinuses are patent. Axial source data: There are no suspicious findings in the lung apices. There is no upper mediastinal or cervical adenopathy. There is some very mild mucosal thickening along the floors of the maxillary sinuses. 1. Mild stenosis of both carotid bulbs. There is no associated stenosis. 2. No intracranial stenoses or occlusions. **This report has been created using voice recognition software. It may contain minor errors which are inherent in voice recognition technology. ** Final report electronically signed by Dr. Jordyn Issa on 8/9/2020 4:56 PM    Xr Chest (2 Vw)    Result Date: 8/9/2020  PROCEDURE: PA and lateral chest x-ray CLINICAL INFORMATION: gait abnormality, hx of NSTEMI COMPARISON: No prior study. TECHNIQUE: PA and lateral views of the chest were obtained. FINDINGS: The cardiomediastinal silhouette appears within normal limits. No focal consolidation, pleural effusion or pneumothorax is seen. No acute osseous abnormalities are demonstrated. There is a calcified granuloma in the left suprahilar region. There is mild S-shaped scoliotic curvature of the thoracic spine. 1. No acute cardiopulmonary disease. **This report has been created using voice recognition software. It may contain minor errors which are inherent in voice recognition technology. ** Final report electronically signed by Dr. Ammy Valentino on 8/9/2020 3:53 PM    Ct Head Wo Contrast    Result Date: 8/9/2020  CT SCAN OF THE BRAIN WITHOUT CONTRAST CLINICAL INFORMATION: Left arm weakness and unsteady gait. COMPARISON: 12/28/2019 TECHNIQUE:  Scans were obtained from the base of the skull to the vertex without IV contrast. All CT scans at this facility use dose modulation, iterative reconstruction, and/or weight-based dosing when appropriate to reduce radiation dose to as low as reasonably achievable. FINDINGS:  No acute intracranial hemorrhage or mass effect is seen. There is no midline shift. The lateral ventricles and cerebral sulci appear normal in size and configuration. Slight subcortical white matter hypoattenuation within the right insular region. However this appears similar to the previous examination and may represent sequela from prior remote insult. Further characterization could be obtained with MRI as clinically indicated. The basal cisterns appear within normal limits. The posterior fossa appears normal. No extra-axial fluid collections are seen. The visualized globes and orbits appear grossly intact. No abnormalities of the calvarium are identified.  The visualized paranasal sinuses and mastoid air cells appear clear. There is tortuosity of the vertebrobasilar system. 1. No acute intracranial hemorrhage. 2. Slight subcortical white matter hypoattenuation within the right insular region. However this appears similar to the previous examination and may represent sequela from prior remote insult. Further characterization could be obtained with MRI as clinically indicated. **This report has been created using voice recognition software. It may contain minor errors which are inherent in voice recognition technology. ** Final report electronically signed by Dr. Glen Hu on 8/9/2020 3:50 PM    Cta Neck W Wo Contrast    Result Date: 8/9/2020  PROCEDURE: CTA HEAD W WO CONTRAST, CTA NECK W WO CONTRAST CLINICAL INFORMATION: gait abnormality. Unsteady gait. Left arm weakness. COMPARISON: Head CT 8/9/2020. Brain MRI 7/15/2020. TECHNIQUE: 1 mm axial images were obtained through the head and neck after the fast bolus administration of contrast. A noncontrast localizer was obtained. 3-D reconstructions were performed on a dedicated 3-D workstation. These include multiplanar MPR images and multiplanar MIP images. Centerline reconstructions were obtained of the carotid systems. Isovue intravenous contrast was given. All CT scans at this facility use dose modulation, iterative reconstruction, and/or weight-based dosing when appropriate to reduce radiation dose to as low as reasonably achievable. FINDINGS: CTA NECK: Aortic arch and branches: There is minimal atherosclerosis of the aortic arch. There is no stenosis of the common origin of innominate artery left common carotid artery. There is mild atherosclerosis of each subclavian artery. There is no stenosis. Right common carotid artery/ICA: There is mild atheromatosis of the right common carotid artery. There is no stenosis. There is minimal atherosclerosis of the right carotid bulb. There is no stenosis. There is no stenosis of the right internal carotid artery.  Left common carotid artery/ICA: The left common carotid artery is normal. There is mild calcified atherosclerosis of the left carotid bulb. There is no stenosis. There is no stenosis of the left internal carotid artery. Vertebral arteries: There is mild atheromatosis of the origin of each vertebral artery. There is mild stenosis at the origin of the left vertebral artery. The left vertebral artery is dominant. No other stenoses are noted. CTA HEAD: Internal carotid arteries: Normal. The ophthalmic artery origins are also normal. Middle cerebral arteries: Normal. The proximal branches are also normal. Anterior cerebral arteries: Normal. The proximal branches are normal. There is a normal small anterior communicating artery. Vertebral arteries: The vertebral arteries are normal.  The vertebral arteries are relatively codominant distally. Basilar artery: There is dolichoectasia. There is no stenosis. Superior cerebellar arteries: Normal. Posterior cerebral arteries: Normal. The proximal branches are also normal. There is a normal posterior communicating artery on the left. No aneurysms, stenoses or occlusions are noted. The superior sagittal sinus, vein of Kayden, internal cerebral veins, straight sinus, transverse sinuses and sigmoid sinuses are patent. Axial source data: There are no suspicious findings in the lung apices. There is no upper mediastinal or cervical adenopathy. There is some very mild mucosal thickening along the floors of the maxillary sinuses. 1. Mild stenosis of both carotid bulbs. There is no associated stenosis. 2. No intracranial stenoses or occlusions. **This report has been created using voice recognition software. It may contain minor errors which are inherent in voice recognition technology. ** Final report electronically signed by Dr. Helena Cordero on 8/9/2020 4:56 PM    Mri Brain Wo Contrast    Result Date: 7/15/2020  PROCEDURE: MRI BRAIN WO CONTRAST INDICATION:  New onset of headaches after age 48, Migraine with aura and without status migrainosus, not intractable, Malignant neoplasm of thyroid gland ([address] Severe intermittent headaches for several months. History of thyroid cancer. COMPARISON: CT head dated 12/28/2019. TECHNIQUE: Multiplanar and multiple spin echo MRI images were obtained of the brain without contrast. FINDINGS: Ventricles, cisterns and sulci are symmetric and mildly prominent, commensurate with age. There are minimal scattered focal areas of T2/FLAIR prolongation in the subcortical deep white matter. No intra or extra-axial mass is identified. No focal areas of  restricted diffusion are present. The major vascular flow voids appear patent. Orbits are unremarkable. There is mild mucosal thickening in the maxillary sinuses. Mastoid air cells are clear. 1. No evidence of acute intracranial abnormality. 2. Minimal chronic microvascular angiopathy. **This report has been created using voice recognition software. It may contain minor errors which are inherent in voice recognition technology. ** Final report electronically signed by Dr. Dudley Dennis MD on 7/15/2020 5:01 PM      DVT prophylaxis: [x] Lovenox                                 [x] SCDs                                 [] SQ Heparin                                 [] Encourage ambulation           [] Already on Anticoagulation     Disposition:    [x] Home       [] TCU       [] Rehab       [] Psych       [] SNF       [] [place]       [] Other-

## 2020-08-10 NOTE — ED NOTES
Assumed pt care from [facility]. Upon initial contact  pt resting in bed. Wife at bedside. Denies any further needs. Will continue to monitor.       Kimmie Dumont RN  08/09/20 2028

## 2020-08-10 NOTE — PROGRESS NOTES
Patient admitted to  Room 19 in a wheelchair  Complaint upon arrival to the room left arm weakness  IV site free of s/s of infection or infiltration. Vital signs obtained. Assessment and data collection initiated. Oriented to room. Policies and procedures for  explained All questions answered with no further questions at this time. Fall prevention and safety brochure discussed with patient. 2 person skin check completed with Joyce Smith RN. Patient declines PCP notification  Patient declines family notification.

## 2020-08-10 NOTE — ED NOTES
Pt resting in bed. Wife at bedside. call light within reach. Awaiting admission. will continue to monitor.       Abel Cormier RN  08/09/20 2015

## 2020-08-10 NOTE — PLAN OF CARE
Problem: Falls - Risk of:  Goal: Will remain free from falls  Description: Will remain free from falls  Outcome: Met This Shift  Note: Patient remains free from falls this shift. Fall precautions in place with bed exit alarmed. Fall sign posted and fall armband in place. Nonskid footwear used with transferring. Educated patient to use call light when in need of staff assistance with transferring, ambulating, and other activities of daily living. Patient appropriately uses call light this shift. Goal: Absence of physical injury  Description: Absence of physical injury  Outcome: Met This Shift  Note: Hourly rounding in place to anticipate patient needs, such as assistance with pain, toileting, or repositioning, in order to decrease risk for injuries such as falls. Problem: Discharge Planning:  Goal: Discharged to appropriate level of care  Description: Discharged to appropriate level of care  Outcome: Met This Shift  Note: Patient is from home with wife and would like to discharge there once medically stable. Problem: Skin Integrity/Risk  Goal: No skin breakdown during hospitalization  Outcome: Met This Shift  Note: Patient's skin remains intact this shift with no new signs of impaired skin integrity noted. Patient able to turn and reposition self. Special mattress in place to decrease pressure on high risk areas. Problem: COMMUNICATION IMPAIRMENT  Goal: Ability to express needs and understand communication  Outcome: Met This Shift  Note: Patient able to clearly communicate needs with no aphasia or dysarthria noted. Care plan reviewed with patient. Patient verbalized understanding of the plan of care and contributed to goal setting.

## 2020-08-10 NOTE — PROGRESS NOTES
[facility] 4A - [id number]    Time In: 4043  Time Out: 0933  Timed Code Treatment Minutes: 8 Minutes  Minutes: 12          Date: 8/10/2020  Patient Name: Chantelle Lopez,  Gender:  male        MRN: [id number]  : 1955  (72 y.o.)      Referring Practitioner: Dr. Jeremi Olvera  Diagnosis: gait instability  Additional Pertinent Hx: Patient presents with complaints of LUE/LLE weakness x 2-3 weeks, with new gait instability first noticed this morning by his wife / states the patient attempted to ambulate and veered off to the left / no fall. She also reports chronic history of intermittent bloody drainage from his ears (no previous workup / additional details unclear). Patient admits to feeling weak and off-balance recently, but is unable to characterize these symptoms further. Endorses history of chronic headaches (unchanged in frequency/severity from baseline; takes topamax). Denies fever/chills, lightheadedness/dizziness, vision/hearing changes, chest pain, palpitations, cough, shortness of breath, n/v/d/c, abdominal pain, paresthesias or syncope. Denies similar symptoms in the past. Denies known history of stroke. Endorses good compliance with daily medications. He is a never smoker. Denies alcohol or illicit drug use.  Denies recent illness, travel, trauma or sick contacts     Restrictions/Precautions:  Restrictions/Precautions: General Precautions    Subjective:  Chart Reviewed: Yes  Patient assessed for rehabilitation services?: Yes  Subjective: pleasant and cooperative, per pt feels is at baseline for mobility and no further needs for PT    General:       Vision: Within Functional Limits    Hearing: Within functional limits         Pain: no pain per pt    Social/Functional History:    Lives With: Spouse  Type of Home: House  Home Layout: One level  Home Access: Ramped entrance(with HR)  Home Equipment: (no AD used PTA)     Ambulation Assistance: Independent  Transfer Assistance: Independent      OBJECTIVE:  Range of Motion:  Bilateral Lower Extremity: WNL    Strength:  Bilateral Lower Extremity: WNL    Balance:  Static Sitting Balance:  Independent  Dynamic Sitting Balance: Independent  Static Standing Balance: Modified Independent  Dynamic Standing Balance: Modified Independent    Bed Mobility:  Rolling to Left: Independent   Rolling to Right: Independent   Supine to Sit: Independent  Sit to Supine: Independent   Scooting: Independent    Transfers:  Sit to Stand: Independent  Stand to Sit:Independent    Ambulation:  Modified Independent  Distance: 10'x1, 350'x1  Surface: Level Tile  Device:No Device  Gait Deviations:  None        Functional Outcome Measures: Completed  Balance Score: 16  Gait Score: 12  Tinetti Total Score: 28  AM-PAC Inpatient Mobility Raw Score : 24  AM-PAC Inpatient T-Scale Score : 61.14    ASSESSMENT:  Activity Tolerance:  Patient tolerance of  treatment: good. Treatment Initiated: Treatment and education initiated within context of evaluation. Evaluation time included review of current medical information, gathering information related to past medical, social and functional history, completion of standardized testing, formal and informal observation of tasks, assessment of data and development of plan of care and goals. Treatment time included skilled education and facilitation of tasks to increase safety and independence with functional mobility for improved independence and quality of life.     Assessment:  Assessment: pt tolerated well, pt is I with home mobility, pt scored 28 on Tinetti which demonstrates low fall risk, no further PT at this time  Prognosis: Excellent    REQUIRES PT FOLLOW UP: No  No Skilled PT: Independent with functional mobility     Discharge Recommendations:  Discharge Recommendations: Home independently    Patient Education:  PT Education: PT Role, Plan of Care    Equipment Recommendations:  Equipment Needed: No    Plan:  Times per week: NA    Goals:  Patient goals : return home  Short term goals  Time Frame for Short term goals: NA  Long term goals  Time Frame for Long term goals : NA    Following session, patient left in safe position with all fall risk precautions in place.

## 2020-08-10 NOTE — PROGRESS NOTES
Samanthaarsalan Webernadia 60  INPATIENT OCCUPATIONAL THERAPY  [facility] NEUROSCIENCES 4A  EVALUATION    Time:   Time In: 1400  Time Out: 1422  Timed Code Treatment Minutes: 14 Minutes  Minutes: 22          Date: 8/10/2020  Patient Name: Aspen Romero,   Gender: male      MRN: [id number]  : 1955  (72 y.o.)  Referring Practitioner: Warren Gillespie MD  Diagnosis: gait instability  Additional Pertinent Hx: Per EMR:Patient presents with complaints of LUE/LLE weakness x 2-3 weeks, with new gait instability first noticed this morning by his wife / states the patient attempted to ambulate and veered off to the left / no fall. She also reports chronic history of intermittent bloody drainage from his ears (no previous workup / additional details unclear). Patient admits to feeling weak and off-balance recently. CT/MRI of head negative. Restrictions/Precautions:  Restrictions/Precautions: General Precautions    Subjective  Chart Reviewed: Yes, Orders, Progress Notes, History and Physical  Patient assessed for rehabilitation services?: Yes  Family / Caregiver Present: Yes(wife)    Subjective: Pt supine in bed upon arrival, requesting to go for a walk. Pain:  Pain Assessment  Patient Currently in Pain: Denies    Social/Functional History:  Lives With: Spouse  Type of Home: House  Home Layout: One level  Home Access: Ramped entrance(with HR)  Home Equipment: (no AD used PTA)   Bathroom Shower/Tub: Tub/Shower unit  Bathroom Toilet: Standard       ADL Assistance: Independent  Homemaking Assistance: Independent  Ambulation Assistance: Independent  Transfer Assistance: Independent    Active : Yes  Leisure & Hobbies: takes care of 11 month old grandson       VISION:Corrected    HEARING:  WFL    COGNITION: WFL    RANGE OF MOTION:  Bilateral Upper Extremity:  WNL    STRENGTH:  Bilateral Upper Extremity:  WNL-grossly 5/5    ADL:   No ADL's completed this session. Esteban Munoz BALANCE:  Sitting Balance:  Independent.     Standing Balance: Independent. BED MOBILITY:  Supine to Sit: Independent    Sit to Supine: Independent    Scooting: Independent      TRANSFERS:  Sit to Stand:  Independent. Stand to Sit: Independent. FUNCTIONAL MOBILITY:  Assistive Device: None  Assist Level: Independent. Distance: in room, 2 laps in hallway  Steady, no LOB. Activity Tolerance:  Patient tolerance of  treatment: good. Pt reported h/o LUE weakness within last few weeks after prolonged activity with LUE (I.e. when holding grandson). Pt reported weakness starts in hand/wrist and progresses proximally, although then later reported has H/O carpal tunnel and cubital tunnel surgeries. Pt presents without overall weakness in LUE compared to RUE. Education provided re: modifying tasks if needed to avoid any overuse with LUE, potential decrease in hours of watching grandson if needed. Encouraged avoiding of any heavy lifting/overhead lifting or prolonged holding of heavier items. Encouraged pt to seek out referral from family MD for outpatient OT if overall LUE weakness persists, as pt currently declines any need for outpatient OT at this time or any LUE exercises. Pt/wife otherwise decline any further need for OT services as pt at baseline. Assessment:  Assessment: Pt currently at baseline for ADLs, mobility, and transfers and safe to return home when medically stable. No further OT services warranted at this time. Prognosis: Good  REQUIRES OT FOLLOW UP: No  No Skilled OT: Independent with functional mobility, Independent with ADL's, At baseline function, Safe to return home, No OT goals identified  Decision Making: Low Complexity    Treatment Initiated: Treatment and education initiated within context of evaluation.   Evaluation time included review of current medical information, gathering information related to past medical, social and functional history, completion of standardized testing, formal and informal observation of tasks, assessment of data and development of plan of care and goals. Treatment time included skilled education and facilitation of tasks to increase safety and independence with ADL's for improved functional independence and quality of life. Discharge Recommendations:  Home with assist PRN, Defer OT at this time    Patient Education:  OT Education: OT Role(ADL/IADL modifications)    Equipment Recommendations:  Equipment Needed: No    Plan:  Times per week: N/A. Goals:     Short term goals  Time Frame for Short term goals: N/A         Following session, patient left in safe position with all fall risk precautions in place.

## 2020-08-10 NOTE — PROGRESS NOTES
Stroke Folder given.    What is Stroke/CVA  Signs and Symptoms of stroke (BEFAST)  Treatments for Stroke  Personal Risk Factors for Stroke discussed  Education--Call [phone]

## 2020-08-10 NOTE — CONSULTS
NEUROLOGY CONSULT NOTE      Requesting Physician: Christina Sorto MD    Reason for Consult:  Evaluate for left side tingling and numbness    History of Present Illness:  Katey Schumacher is a 72 y.o. male admitted to [address] [place] [address] on 8/9/2020.        72year old man with of HTN, HLD, he started c/o left arm numbness since yesterday, the numbness is more like tingling sensation, he feels better since coming in to the hospital, pt does complain of some neck pain. Patient has chronic headache and takes topamax. Reports no chest pain. No shortness of breath with exertion. No vision changes. No dysphagia. No fever. No rash. No weight loss.     Past Medical History:        Diagnosis Date    Arthritis     Coronary artery disease involving native coronary artery of native heart without angina pectoris 7/10/2018    Hyperlipidemia     Hypertension     Liver disease     Hep A 18 years ago    Malignant neoplasm of thyroid gland ([facility] [address] 3/15/2019           Procedure Laterality Date    CARPAL TUNNEL RELEASE      CHOLECYSTECTOMY      HERNIA REPAIR      THYROIDECTOMY      TONSILLECTOMY AND ADENOIDECTOMY      TOTAL KNEE ARTHROPLASTY Left        Allergies:    No Known Allergies     Current Medications:   amLODIPine (NORVASC) tablet 5 mg, Daily  aspirin chewable tablet 81 mg, Daily  atorvastatin (LIPITOR) tablet 40 mg, Daily  clopidogrel (PLAVIX) tablet 75 mg, Daily  levothyroxine (SYNTHROID) tablet 125 mcg, Daily  topiramate (TOPAMAX) tablet 50 mg, Nightly  sodium chloride flush 0.9 % injection 10 mL, 2 times per day  sodium chloride flush 0.9 % injection 10 mL, PRN  acetaminophen (TYLENOL) tablet 650 mg, Q6H PRN    Or  acetaminophen (TYLENOL) suppository 650 mg, Q6H PRN  polyethylene glycol (GLYCOLAX) packet 17 g, Daily PRN  promethazine (PHENERGAN) tablet 12.5 mg, Q6H PRN    Or  ondansetron (ZOFRAN) injection 4 mg, Q6H PRN  enoxaparin (LOVENOX) injection 40 mg, Daily         Social History:  Social History     Tobacco Use   Smoking Status Never Smoker   Smokeless Tobacco Never Used     Social History     Substance and Sexual Activity   Alcohol Use No     Social History     Substance and Sexual Activity   Drug Use No         Family History:       Problem Relation Age of Onset    Heart Disease Mother     High Blood Pressure Mother     High Cholesterol Mother     Arthritis Mother     Miscarriages / Stillbirths Mother     Heart Disease Father     Arthritis Father     Cancer Brother         melanoma       Review of Systems:  All systems reviewed and are all negative except what is mentioned in history of present illness. Physical Exam:  BP (!) 120/57   Pulse 64   Temp 98.1 °F (36.7 °C) (Oral)   Resp 18   Ht 5' 9\" (1.753 m)   Wt 191 lb 3.2 oz (86.7 kg)   SpO2 95%   BMI 28.24 kg/m²  I Body mass index is 28.24 kg/m². I   Wt Readings from Last 1 Encounters:   08/09/20 191 lb 3.2 oz (86.7 kg)           General appearance - alert, well appearing, and in no distress, oriented to person, place, and time and weight  Mental status -Level of Alertness: awake  Orientation: person, place, time  Memory: normal  Fund of Knowledge: normal  Attention/Concentration: normal  Language: normal. Mood is normal  Neck - supple, no neck adenopathy, carotids upstroke normal bilaterally, no carotid bruits. There is no LAP in the neck. There is no thyroid enlargement.      Neurological -   Cranial Kylefi-NP-VTG:   Cranial nerve II: Normal. There is full visual fields  Cranial nerve III: Pupils: equal, round, reactive to light   Cranial nerves III, IV, VI: Extraocular Movements: intact   Cranial nerve V: Facial sensation: intact   Cranial nerve VII:Facial strength: intact   Cranial nerve VIII: Hearing: intact   Cranial nerve IX: Palate Elevation intact bilaterally  Cranial nerve XI: Shoulder shrug intact bilaterally  Cranial nerve XII: Tongue midline   neck supple without rigidity  Motor exam is 5/5 in the upper and lower extremities. Normal muscle tone . There is no muscle atrophy. There is no muscle fasciculation    Sensory is intact   Coordination: finger to nose intact  Gait and station not tested     Abnormal movement none. Long tracts are intact   Skin - no rashes or lesions, warm and dry to touch  Superficial temporal artery pulses are normal.   Musculoskeletal: Has no hand arthritis, no limitation of ROM in any of the four extremities. no joint tenderness, deformity or swelling. There is no leg edema. The Heart was regular in rate and rhythm. No heart murmur  Chest- Clear  Abdomen: soft, intact bowel sounds. Labs:    CBC:   Recent Labs     08/09/20  1429 08/10/20  0351   WBC 4.2* 6.1   HGB 13.6* 14.1    211   MCV 87.6 86.8   MCH 28.2 27.9   MCHC 32.2 32.1*     CMP:  Recent Labs     08/09/20  1429 08/10/20  0351    140   K 3.6 3.9    112*   CO2 21* 20*   BUN 22 15   CREATININE 1.0 1.0   LABGLOM 75* 75*   GLUCOSE 114* 111*   CALCIUM 8.2* 8.0*     Liver: No results for input(s): AST, ALT, ALKPHOS, PROT, LABALBU, BILITOT in the last 72 hours. Invalid input(s): BILDIR  MRI BRAIN:  No results found for this or any previous visit. No results found for this or any previous visit. Results for orders placed during the hospital encounter of 08/09/20   CTA HEAD W WO CONTRAST    Narrative PROCEDURE: CTA HEAD W WO CONTRAST, CTA [address] INFORMATION: gait abnormality. Unsteady gait. Left arm weakness. COMPARISON: Head CT 8/9/2020. Brain MRI 7/15/2020. TECHNIQUE: 1 mm axial images were obtained through the head and neck after the fast bolus administration of contrast. A noncontrast localizer was obtained. 3-D reconstructions were performed on a dedicated 3-D workstation. These include multiplanar MPR   images and multiplanar MIP images. Centerline reconstructions were obtained of the carotid systems. Isovue intravenous contrast was given.     All CT scans at this facility use dose modulation, iterative reconstruction, and/or weight-based dosing when appropriate to reduce radiation dose to as low as reasonably achievable. FINDINGS:      CTA NECK:    Aortic arch and branches: There is minimal atherosclerosis of the aortic arch. There is no stenosis of the common origin of innominate artery left common carotid artery. There is mild atherosclerosis of each subclavian artery. There is no stenosis. Right common carotid artery/ICA: There is mild atheromatosis of the right common carotid artery. There is no stenosis. There is minimal atherosclerosis of the right carotid bulb. There is no stenosis. There is no stenosis of the right internal carotid   artery. Left common carotid artery/ICA: The left common carotid artery is normal. There is mild calcified atherosclerosis of the left carotid bulb. There is no stenosis. There is no stenosis of the left internal carotid artery. Vertebral arteries: There is mild atheromatosis of the origin of each vertebral artery. There is mild stenosis at the origin of the left vertebral artery. The left vertebral artery is dominant. No other stenoses are noted. CTA HEAD:      Internal carotid arteries: Normal. The ophthalmic artery origins are also normal.    Middle cerebral arteries: Normal. The proximal branches are also normal.    Anterior cerebral arteries: Normal. The proximal branches are normal. There is a normal small anterior communicating artery. Vertebral arteries: The vertebral arteries are normal.  The vertebral arteries are relatively codominant distally. Basilar artery: There is dolichoectasia. There is no stenosis. Superior cerebellar arteries: Normal.    Posterior cerebral arteries: Normal. The proximal branches are also normal.    There is a normal posterior communicating artery on the left. No aneurysms, stenoses or occlusions are noted.     The superior sagittal sinus, vein of Kayden, internal cerebral veins, straight sinus, transverse sinuses and sigmoid sinuses are patent. Axial source data: There are no suspicious findings in the lung apices. There is no upper mediastinal or cervical adenopathy. There is some very mild mucosal thickening along the floors of the maxillary sinuses. Impression    1. Mild stenosis of both carotid bulbs. There is no associated stenosis. 2. No intracranial stenoses or occlusions. **This report has been created using voice recognition software. It may contain minor errors which are inherent in voice recognition technology. **    Final report electronically signed by Dr. Tiesha Brown on 8/9/2020 4:56 PM     Results for orders placed during the hospital encounter of 08/09/20   CTA NECK W WO CONTRAST    Narrative PROCEDURE: CTA HEAD W [address] INFORMATION: gait abnormality. Unsteady gait. Left arm weakness. COMPARISON: Head CT 8/9/2020. Brain MRI 7/15/2020. TECHNIQUE: 1 mm axial images were obtained through the head and neck after the fast bolus administration of contrast. A noncontrast localizer was obtained. 3-D reconstructions were performed on a dedicated 3-D workstation. These include multiplanar MPR   images and multiplanar MIP images. Centerline reconstructions were obtained of the carotid systems. Isovue intravenous contrast was given. All CT scans at this facility use dose modulation, iterative reconstruction, and/or weight-based dosing when appropriate to reduce radiation dose to as low as reasonably achievable. FINDINGS:      CTA NECK:    Aortic arch and branches: There is minimal atherosclerosis of the aortic arch. There is no stenosis of the common origin of innominate artery left common carotid artery. There is mild atherosclerosis of each subclavian artery. There is no stenosis. Right common carotid artery/ICA: There is mild atheromatosis of the right common carotid artery. There is no stenosis.  There is minimal atherosclerosis of the right carotid bulb. There is no stenosis. There is no stenosis of the right internal carotid   artery. Left common carotid artery/ICA: The left common carotid artery is normal. There is mild calcified atherosclerosis of the left carotid bulb. There is no stenosis. There is no stenosis of the left internal carotid artery. Vertebral arteries: There is mild atheromatosis of the origin of each vertebral artery. There is mild stenosis at the origin of the left vertebral artery. The left vertebral artery is dominant. No other stenoses are noted. CTA HEAD:      Internal carotid arteries: Normal. The ophthalmic artery origins are also normal.    Middle cerebral arteries: Normal. The proximal branches are also normal.    Anterior cerebral arteries: Normal. The proximal branches are normal. There is a normal small anterior communicating artery. Vertebral arteries: The vertebral arteries are normal.  The vertebral arteries are relatively codominant distally. Basilar artery: There is dolichoectasia. There is no stenosis. Superior cerebellar arteries: Normal.    Posterior cerebral arteries: Normal. The proximal branches are also normal.    There is a normal posterior communicating artery on the left. No aneurysms, stenoses or occlusions are noted. The superior sagittal sinus, vein of Kayden, internal cerebral veins, straight sinus, transverse sinuses and sigmoid sinuses are patent. Axial source data: There are no suspicious findings in the lung apices. There is no upper mediastinal or cervical adenopathy. There is some very mild mucosal thickening along the floors of the maxillary sinuses. Impression    1. Mild stenosis of both carotid bulbs. There is no associated stenosis. 2. No intracranial stenoses or occlusions. **This report has been created using voice recognition software.  It may contain minor errors which are inherent in voice recognition technology. **    Final report electronically signed by Dr. Radha Farias on 8/9/2020 4:56 PM     Results for orders placed during the hospital encounter of 08/09/20   MRI BRAIN WO CONTRAST    Narrative PROCEDURE: MRI BRAIN WO CONTRAST    INDICATION:  R/o CVA. Gait instability and left-sided weakness with pain in the bilateral lower extremities. COMPARISON: CT head dated 10/9/2020 and MR brain dated 7/15/2020. TECHNIQUE: Multiplanar and multiple spin echo MRI images were obtained of the brain without contrast.    FINDINGS:  Ventricles, cisterns and sulci are symmetric and mildly prominent, commensurate with age. No significant focal areas of abnormal T2/flair prolongation are identified within the parenchyma. No intra or extra-axial mass is identified. No focal areas of   restricted diffusion are present. The major vascular flow voids appear patent. Orbits are unremarkable. There is mild mucosal thickening in the maxillary sinuses, stable compared to prior exam. Mastoid air cells are clear. Impression    1. No intracranial abnormality identified. 2. Mild chronic sinusitis. **This report has been created using voice recognition software. It may contain minor errors which are inherent in voice recognition technology. **    Final report electronically signed by Dr. Corean Meigs, MD on 8/10/2020 11:27 AM     No results found for this or any previous visit. No results found for this or any previous visit. Results for orders placed during the hospital encounter of 08/09/20   CT Head WO Contrast    Narrative CT SCAN OF THE BRAIN WITHOUT CONTRAST    CLINICAL INFORMATION: Left arm weakness and unsteady gait.     COMPARISON: 12/28/2019    TECHNIQUE:  Scans were obtained from the base of the skull to the vertex without IV contrast. All CT scans at this facility use dose modulation, iterative reconstruction, and/or weight-based dosing when appropriate to reduce radiation dose to as low as chronic headache  Pt can be discharged today from neuro standpoint                                            1. Case was discussed with primary service. 2. All questions were answered. It was my pleasure to evaluate Salvatore Traylor today. Please call with questions.       Electronically signed by Eleni Jones MD on 8/10/2020 at 12:09 PM    Татьяна Valentine MD  Attending Neurologist/Neurointensivist

## 2020-08-10 NOTE — TELEPHONE ENCOUNTER
.Transition of Care visit scheduled. 08/13/20    Visit date not found  Patient is being discharged to home  Date of discharge 08/10/20  Discharge from facility [facility]  Reason for admission bulging disk and stroke like symptoms

## 2020-08-10 NOTE — FLOWSHEET NOTE
08/10/20 [id number]   Provider Notification   Reason for Communication Evaluate   Provider Name Dr. Gloria Calderon   Provider Notification Physician   Method of Communication Secure Message   Response Waiting for response   Notification Time 0820   Notified Dr. Gloria Calderon of new consult. Currently waiting for response. 5589- States to add to list and will see patient.

## 2020-08-11 ENCOUNTER — TELEPHONE (OUTPATIENT)
Dept: FAMILY MEDICINE CLINIC | Age: 65
End: 2020-08-11

## 2020-08-11 NOTE — TELEPHONE ENCOUNTER
[facility] Transitions Initial Follow Up Call    Outreach made within 2 business days of discharge: Yes    Patient: Gee King Patient : 1955   MRN: [id number]  Reason for Admission: There are no discharge diagnoses documented for the most recent discharge. Discharge Date: 8/10/20       Spoke with: patient    Discharge department/facility: [facility]    TCM Interactive Patient Contact:  Was patient able to fill all prescriptions: Yes  Was patient instructed to bring all medications to the follow-up visit: Yes  Is patient taking all medications as directed in the discharge summary?  Yes  Does patient understand their discharge instructions: Yes  Does patient have questions or concerns that need addressed prior to 7-14 day follow up office visit: no    Scheduled appointment with PCP within 7-14 days    Follow Up  Future Appointments   Date Time Provider Jose Chou   2020  2:45 PM Anastasiia Forrester, [address] [place] [address]   2020 10:30 AM Gerald Fair  [facility] [place] [place] [facility] [place]   2020 10:30 AM Landy Aiken MD [address] [place] [address]       Melissa Garcia LPN

## 2020-08-11 NOTE — TELEPHONE ENCOUNTER
[facility] Transitions Initial Follow Up Call    Outreach made within 2 business days of discharge: Yes    Patient: Neftali Ramsey Patient : 1955   MRN: [id number]  Reason for Admission: There are no discharge diagnoses documented for the most recent discharge. Discharge Date: 8/10/20       Spoke with: WINIFRED    Discharge department/facility: [facility]    TCM Interactive Patient Contact:  Was patient able to fill all prescriptions: N/A  Was patient instructed to bring all medications to the follow-up visit: N/A  Is patient taking all medications as directed in the discharge summary?  N/A  Does patient understand their discharge instructions: N/A  Does patient have questions or concerns that need addressed prior to 7-14 day follow up office visit: N/A    Scheduled appointment with PCP within 7-14 days    Follow Up  Future Appointments   Date Time Provider Jose Chou   2020  2:45 PM Beronica Chacon, [facility][note 3]   2020 10:30 AM Chris Jones MD [facility]   2020 10:30 AM Margarette Weinberg  [facility]

## 2020-08-13 ENCOUNTER — OFFICE VISIT (OUTPATIENT)
Dept: FAMILY MEDICINE CLINIC | Age: 65
End: 2020-08-13
Payer: MEDICARE

## 2020-08-13 VITALS
RESPIRATION RATE: 18 BRPM | TEMPERATURE: 97.9 F | DIASTOLIC BLOOD PRESSURE: 86 MMHG | SYSTOLIC BLOOD PRESSURE: 136 MMHG | WEIGHT: 194.2 LBS | HEART RATE: 64 BPM | BODY MASS INDEX: 28.68 KG/M2

## 2020-08-13 PROCEDURE — 99495 TRANSJ CARE MGMT MOD F2F 14D: CPT | Performed by: NURSE PRACTITIONER

## 2020-08-13 RX ORDER — TADALAFIL 20 MG/1
20 TABLET ORAL PRN
Qty: 10 TABLET | Refills: 2 | Status: SHIPPED | OUTPATIENT
Start: 2020-08-13 | End: 2020-09-16

## 2020-08-13 ASSESSMENT — ENCOUNTER SYMPTOMS
SHORTNESS OF BREATH: 0
ABDOMINAL PAIN: 0
NAUSEA: 0
COUGH: 0

## 2020-08-13 NOTE — PROGRESS NOTES
Post-Discharge Transitional Care Management Services      Elise Pro   YOB: 1955    Date of Visit:  8/13/2020  30 DayPost-Discharge Date: 9/10/20    Chief Complaint   Patient presents with    Follow-Up from [address] [place] [address] D/C'd from [facility] on 8/10/20       Admit Date: 8/9/2020                                                  Discharge Date:  8/10/2020      Admitting Physician: Jimbo Perry MD  Discharge Physician: Janice Collins MD      Discharge Diagnoses      DISCHARGE DIAGNOSES:  1. Left sided weakness with gait instability, r/o musculoskeletal etiology: Initial CT head showed a subcortical white mattehypoattenuation within the right insular region that was similar to previous CT scans. MRI brain did  not show evidence of a stroke. MRI of cervical spine showed multilevel degenerative changes of the cervical spine most pronounced at C4-C5 with a shallow disc bulge with mild spinal stenosis and moderate right and severe left left neural foraminal stenosis of cervical spine. CTA of head and neck did not show significant stenosis. . Continue aspirin and Plavix. The patient was referred to Dr. Vernon Rodríguez as an outpatient. The case was discussed with Dr. Vernon Rodríguez prior to discharge  2. Left ear hemorrhage: referral to ENT as outpatient  3. HTN: continue home medications  4. Hyperlipidemia  5. Chronic diastolic CHF  6. History of thyroid CA s/p thyroidectomy: Free T4 unremarkable  7. Chronic headaches: On Topamax. Follow up with Neurology as an outpatient. Patient may have tension headaches as well given the findings of ostearthritis of the cervical spine     Disposition:      [x]? Home                               Condition at Discharge: Stable  and improved     The patient was seen and examined on day of discharge and this discharge summary is in conjunction with any daily progress note from day of discharge.  [place] course      Elise Pro is a 72 y.o. male 72 y. o. male w/ PMH of HTN who was admitted on 8/9/2020 .  Some of the history is provided by the patient's wife. The patient mentions that on the weekend he was out at his camper when he noticed that his left ear was bleeding. This is not unusual for him and he has experienced some intermittent bleeding from his left ear. He then went for a walk and noticed some gait instability and left sided weakness as well as pain at the bilateral LE. He also mentioned that for the past 2-3 weeks when lifting his grandchildren, he has noticed some LUE weakness. He also mentioned persistent headaches at the base of his skull for which he was prescribed migraine medications. He also feels some \"cracking in his neck:  In the ED, CT scan of the head showed an abnormality at the right insular region. MRI did not show any evidence of a stroke. MRI of the cervical spine is pending. The patient was improved at the time of discharge.      Follow Up         Future Appointments   Date Time Provider Jose Chou   8/13/2020  2:45 PM KATIE Alcaraz   8/26/2020 10:30 AM MD Melissa Richardson [address]   11/19/2020 10:30 AM Dalila Farias MD [facility] [address]         Allergies   Allergen Reactions    Pepto-Bismol [Bismuth Subsalicylate] Nausea And Vomiting     Outpatient Medications Marked as Taking for the 8/13/20 encounter (Office Visit) with KATIE Alcaraz - CNP   Medication Sig Dispense Refill    tadalafil (CIALIS) 20 MG tablet Take 1 tablet by mouth as needed for Erectile Dysfunction 10 tablet 2    acetaminophen (TYLENOL) 500 MG tablet Take 1 tablet by mouth 4 times daily as needed for Pain (for neck pain and headaches) 360 tablet 1    topiramate (TOPAMAX) 50 MG tablet TAKE 1 TABLET BY MOUTH EVERY NIGHT 90 tablet 1    atorvastatin (LIPITOR) 40 MG tablet TAKE 1 TABLET BY MOUTH DAILY 30 tablet 11    metoprolol succinate (TOPROL XL) 25 MG extended release tablet TAKE 1 TABLET BY MOUTH DAILY 30 tablet 11    clopidogrel (PLAVIX) 75 MG tablet TAKE 1 TABLET BY MOUTH DAILY 30 tablet 11    aspirin 81 MG chewable tablet CHEW AND SWALLOW 1 TABLET BY MOUTH DAILY 30 tablet 11    nitroGLYCERIN (NITROSTAT) 0.4 MG SL tablet Take 1 tablet at onset of chest pain, if no relief in 5 min may repeat x 2 then call 911 25 tablet 3    levothyroxine (SYNTHROID) 125 MCG tablet            Vitals:    08/13/20 1451   BP: 136/86   Site: Right Upper Arm   Position: Sitting   Cuff Size: Medium Adult   Pulse: 64   Resp: 18   Temp: 97.9 °F (36.6 °C)   TempSrc: Temporal   Weight: 194 lb 3.2 oz (88.1 kg)     Body mass index is 28.68 kg/m². Wt Readings from Last 3 Encounters:   08/13/20 194 lb 3.2 oz (88.1 kg)   08/09/20 191 lb 3.2 oz (86.7 kg)   05/22/20 186 lb 12.8 oz (84.7 kg)     BP Readings from Last 3 Encounters:   08/13/20 136/86   08/10/20 114/72   05/22/20 126/74        Patient was admitted to [facility] from 8/9/20 to 8/10/20 for Left Arm Weakness. Inpatient course: Discharge summary reviewed- see chart. Current status:     Review of Systems:  Review of Systems   Constitutional: Negative for chills and fever. HENT: Negative. Respiratory: Negative for cough and shortness of breath. Cardiovascular: Negative for chest pain. Gastrointestinal: Negative for abdominal pain and nausea. Musculoskeletal: Positive for neck pain. Negative for neck stiffness. Skin: Negative for rash. Neurological: Positive for weakness. Negative for dizziness, light-headedness and headaches. Psychiatric/Behavioral: Negative. Physical Exam:  Physical Exam  Constitutional:       General: He is not in acute distress. Eyes:      Pupils: Pupils are equal, round, and reactive to light. Neck:      Musculoskeletal: Normal range of motion and neck supple. Cardiovascular:      Rate and Rhythm: Normal rate and regular rhythm. Heart sounds: No murmur.    Pulmonary:      Effort: Pulmonary effort is normal.

## 2020-08-13 NOTE — PROGRESS NOTES
Visit Information    Have you changed or started any medications since your last visit including any over-the-counter medicines, vitamins, or herbal medicines? no   Are you having any side effects from any of your medications? -  no  Have you stopped taking any of your medications? Is so, why? -  no    Have you seen any other physician or provider since your last visit? Yes - Records Obtained  Have you had any other diagnostic tests since your last visit? Yes - Records Obtained  Have you been seen in the emergency room and/or had an admission to a hospital since we last saw you? Yes - Records Obtained  Have you had your routine dental cleaning in the past 6 months? n/a    Have you activated your [employer] account? If not, what are your barriers?  Yes     Patient Care Team:  KATIE Vega CNP as PCP - General (Certified Nurse Practitioner)  KATIE Vega CNP as PCP - [facility] Provider    Medical History Review  Past Medical, Family, and Social History reviewed and does not contribute to the patient presenting condition    Health Maintenance   Topic Date Due    DTaP/Tdap/Td vaccine (1 - Tdap) 03/19/1974    Shingles Vaccine (1 of 2) 03/19/2005    Colon cancer screen colonoscopy  03/19/2005    Pneumococcal 65+ years Vaccine (1 of 1 - PPSV23) 03/19/2020    Flu vaccine (1) 09/01/2020    A1C test (Diabetic or Prediabetic)  08/10/2021    Lipid screen  08/10/2021    Potassium monitoring  08/10/2021    Creatinine monitoring  08/10/2021    Hepatitis C screen  Addressed    HIV screen  Addressed    Hepatitis A vaccine  Aged Out    Hepatitis B vaccine  Aged Out    Hib vaccine  Aged Out    Meningococcal (ACWY) vaccine  Aged Out

## 2020-08-13 NOTE — LETTER
[address] [address]  Phone: [phone]  Fax: [facility], APRN - CNP        August 13, 2020     Patient: Alena Lennox   YOB: 1955   Date of Visit: 8/13/2020       To Whom It May Concern: It is my medical opinion that Lennart Phoenix require the surveillance of his wife Danette Sarabia s/p hospitalization from 8/10/20 through 8/12/20. If you have any questions or concerns, please don't hesitate to call.     Sincerely,        Monika Palacios, KATIE - CNP

## 2020-08-27 ENCOUNTER — OFFICE VISIT (OUTPATIENT)
Dept: NEUROSURGERY | Age: 65
End: 2020-08-27
Payer: MEDICARE

## 2020-08-27 VITALS
HEART RATE: 72 BPM | WEIGHT: 194.6 LBS | DIASTOLIC BLOOD PRESSURE: 96 MMHG | TEMPERATURE: 98 F | SYSTOLIC BLOOD PRESSURE: 149 MMHG | HEIGHT: 69 IN | BODY MASS INDEX: 28.82 KG/M2

## 2020-08-27 PROCEDURE — 99204 OFFICE O/P NEW MOD 45 MIN: CPT | Performed by: NEUROLOGICAL SURGERY

## 2020-08-27 NOTE — LETTER
[address] [facility] Neurosurgery  [address]  Phone: [phone]  Fax: [phone]    Rick Shaikh MD        August 27, 2020     Kayleen Christopher, APRN - CNP  [address] [facility], [address] [place] [address]  [place] Cabral [address]    Patient: Elise Pro  MR Number: [id number]  YOB: 1955  Date of Visit: 8/27/2020    Dear Dr. Kayleen Christopher: Thank you for the request for consultation for Renato Lockwood to me for the evaluation of abnormal findings seen in his recent cervical spine MRI. Below are the relevant portions of my assessment and plan of care. If you have questions, please do not hesitate to call me. I look forward to following Bogdan along with you.     Sincerely,        Rick Shaikh MD

## 2020-08-27 NOTE — PROGRESS NOTES
[facility] PROFESSIONAL SERVS  [address] [place] [address] Road [facility]  Dept: [phone]  Dept Fax: [phone]      Patient Name:  Oliverio Butler  Visit Date:  8/27/2020    HPI:     Mr. Peggy Robles is a 72 y.o. male that presents today at [facility] Neurosurgery for evaluation of the following:      Chief Complaint   Patient presents with    Follow-Up from Hospital     Cervical spine disease        HPI     This is a 70-year-old male who underwent cervical spine MRI that showed:      Multilevel degenerative changes of the cervical spine most pronounced at C4-5 where a shallow disc bulge with rim osteophytes causes mild spinal canal stenosis and moderate right and severe left neural foraminal stenosis in association with    uncovertebral joint degenerative change and facet hypertrophy. Patient underwent that cervical spine MRI as as a part of work-up for left arm numbness and tingling and unsteady gait. In today evaluation, patient denied any significant neck pain (despite he stated that he has neck pain from time to time). Patient denied any significant radiating pain in his arms. Patient denied      Any significant numbness in his hands now. However, he stated that he has numbness in his left hand from time to time. Patient denied any major issues controlling her urination or bowel habits. Patient has past medical history significant for left-sided carpal tunnel surgery.     Medications:    Current Outpatient Medications:     Multiple Vitamins-Minerals (MENS 50+ MULTI VITAMIN/MIN) TABS, Take by mouth daily, Disp: , Rfl:     tadalafil (CIALIS) 20 MG tablet, Take 1 tablet by mouth as needed for Erectile Dysfunction, Disp: 10 tablet, Rfl: 2    acetaminophen (TYLENOL) 500 MG tablet, Take 1 tablet by mouth 4 times daily as needed for Pain (for neck pain and headaches), Disp: 360 tablet, Rfl: 1    topiramate (TOPAMAX) 50 MG tablet, TAKE 1 TABLET BY MOUTH EVERY NIGHT, Disp: 90 tablet, Rfl: 1    atorvastatin (LIPITOR) 40 MG tablet, TAKE 1 TABLET BY MOUTH DAILY, Disp: 30 tablet, Rfl: 11    metoprolol succinate (TOPROL XL) 25 MG extended release tablet, TAKE 1 TABLET BY MOUTH DAILY, Disp: 30 tablet, Rfl: 11    clopidogrel (PLAVIX) 75 MG tablet, TAKE 1 TABLET BY MOUTH DAILY, Disp: 30 tablet, Rfl: 11    aspirin 81 MG chewable tablet, CHEW AND SWALLOW 1 TABLET BY MOUTH DAILY, Disp: 30 tablet, Rfl: 11    nitroGLYCERIN (NITROSTAT) 0.4 MG SL tablet, Take 1 tablet at onset of chest pain, if no relief in 5 min may repeat x 2 then call 911, Disp: 25 tablet, Rfl: 3    levothyroxine (SYNTHROID) 125 MCG tablet, , Disp: , Rfl:     The patient is allergic to pepto-bismol [bismuth subsalicylate]. Past Medical History  Bogdan  has a past medical history of Arthritis, Chronic diastolic CHF (congestive heart failure) ([address] Coronary artery disease involving native coronary artery of native heart without angina pectoris, Hyperlipidemia, Hypertension, Liver disease, and Malignant neoplasm of thyroid gland ([address] Past Surgical History  The patient  has a past surgical history that includes Carpal tunnel release; Tonsillectomy and adenoidectomy; hernia repair; Cholecystectomy; Total knee arthroplasty (Left); and Thyroidectomy. Family History  This patient's family history includes Arthritis in his father and mother; Cancer in his brother; Heart Disease in his father and mother; High Blood Pressure in his mother; High Cholesterol in his mother; Ardeen Federico / [place] in his mother. Social History  Bogdan  reports that he has never smoked. He has never used smokeless tobacco. He reports that he does not drink alcohol or use drugs.     Subjective:      Review of Systems    Objective:     BP (!) 149/96 (Site: Right Upper Arm, Position: Sitting, Cuff Size: Large Adult)   Pulse 72   Temp 98 °F (36.7 °C)   Ht 5' 9.02\" (1.753 m)   Wt 194 lb 9.6 oz (88.3 kg)   BMI 28.72 kg/m² Examination of carotid arteries (puls, amplitude, bruits) or Examination of peripheral vascular system  (swelling, varicosities and pulses, temperature, edema,tenderness : Unremarkable  Patient is awake/ alert/oriented x3  Recent and remote memory: Decline  Attention span and concentration: Decline  Language (naming objects;repeating phrases;spontaneous speech): WNL  Fund of knowledge: Good  Cranial nerves: 2-12: Grossly intact  Muscle strength, tone in all 4 extremities: 5/5 throughout  DTR in all 4 extremities: 2+  Babinski: Down response  Gait: Guarded  Cerebellar function: WNL  Sensation: Grossly intact  Straight leg raising test: Negative  Has postural coronal and sagittal imbalance. Has limitation in cervical spine movement in all direction. Slightly positive Tinel's sign  and Phalen's sign    Reviewed MRI Type:  Film and Report    Lab Results   Component Value Date    WBC 6.1 08/10/2020    HGB 14.1 08/10/2020    HCT 43.9 08/10/2020     08/10/2020    CHOL 130 08/10/2020    TRIG 138 08/10/2020    HDL 46 08/10/2020    ALT 21 05/29/2020    AST 16 05/29/2020     08/10/2020    K 3.9 08/10/2020     (H) 08/10/2020    CREATININE 1.0 08/10/2020    BUN 15 08/10/2020    CO2 20 (L) 08/10/2020    TSH 0.284 (L) 08/10/2020    PSA 0.72 09/13/2018    INR 1.07 03/17/2018    LABA1C 6.2 08/10/2020       Assessment and Plan      Diagnosis Orders   1. Left hand weakness  CT CERVICAL SPINE [address] [facility]. [facility] Neurology (EMG) - Barbie Flores MD   2. Gait instability  CT CERVICAL SPINE [address] Neurology (EMG) - Barbie Flores MD   3. Spondylosis of cervical spine  CT CERVICAL SPINE [address] Neurology (EMG) - Barbie Flores MD   4.  Numbness of left hand  CT CERVICAL SPINE [address] Neurology (EMG) - Barbie Flores MD     -This is a 69-year-old who has a clinical picture coupled with cervical spine MRI suggestive of cervical spine degenerative disease. This time and before finalize patient recommendation treatment plan from neurosurgical perspective, I will request for patient cervical spine CT and EMG for the upper left extremity. After this new investigations are completed I will see the patient again for evaluation to update his recommendation and treatment plan. -I had a discussion with patient today regarding the finding of his  Cervical spine MRI and my recommendation and treatment plan for him at this time.  -All questions and concerns were addressed and answered.  -Patient was in agreement with the above recommendation and treatment plan. *Time spent with the patient was 45 minutes. More than 50% was spent counseling/coordinating the patient's care.       Electronically signed by Leatha Hernández MD on 8/27/2020 at 11:05 AM

## 2020-08-27 NOTE — LETTER
[address] [facility] Neurosurgery  [address]  Phone: [phone]  Fax: [phone]    Neftali Fortune MD        August 27, 2020     Randy Harris, APRN - CNP  [address] [facility], [address] [place] [address]  [address] [address]    Patient: Arin Patterson  MR Number: [id number]  YOB: 1955  Date of Visit: 8/27/2020    Dear Dr. Randy Harris: Thank you for the request for consultation for Jacobo Seaman to me for the evaluation of his neck pain and left arm numbness issues. Below are the relevant portions of my assessment and plan of care. HPI:    This is a 17-year-old male who underwent cervical spine MRI that showed:      Multilevel degenerative changes of the cervical spine most pronounced at C4-5 where a shallow disc bulge with rim osteophytes causes mild spinal canal stenosis and moderate right and severe left neural foraminal stenosis in association with    uncovertebral joint degenerative change and facet hypertrophy. Patient underwent that cervical spine MRI as as a part of work-up for left arm numbness and tingling and unsteady gait. In today evaluation, patient denied any significant neck pain (despite he stated that he has neck pain from time to time). Patient denied any significant radiating pain in his arms. Patient denied      Any significant numbness in his hands now. However, he stated that he has numbness in his left hand from time to time. Patient denied any major issues controlling her urination or bowel habits. Patient has past medical history significant for left-sided carpal tunnel surgery.     On physical exam:    Patient is awake/ alert/oriented x3  Recent and remote memory: Decline  Attention span and concentration: Decline  Language (naming objects;repeating phrases;spontaneous speech): WNL  Fund of knowledge: Good  Cranial nerves: 2-12: Grossly intact  Muscle strength, tone in all 4 extremities: 5/5 throughout  DTR in all 4 extremities: 2+ Babinski: Down response  Gait: Guarded  Cerebellar function: WNL  Sensation: Grossly intact  Straight leg raising test: Negative  Has postural coronal and sagittal imbalance. Has limitation in cervical spine movement in all direction. Slightly positive Tinel's sign  and Phalen's sign  Assessment and plan:      -This is a 77-year-old who has a clinical picture coupled with cervical spine MRI suggestive of cervical spine degenerative disease. This time and before finalize patient recommendation treatment plan from neurosurgical perspective, I will request for patient cervical spine CT and EMG for the upper left extremity. After this new investigations are completed I will see the patient again for evaluation to update his recommendation and treatment plan. -I had a discussion with patient today regarding the finding of his  Cervical spine MRI and my recommendation and treatment plan for him at this time.  -All questions and concerns were addressed and answered.  -Patient was in agreement with the above recommendation and treatment plan. If you have questions, please do not hesitate to call me. I look forward to following Bogdan along with you.     Sincerely,        Ana Asif MD

## 2020-09-08 ENCOUNTER — TELEPHONE (OUTPATIENT)
Dept: FAMILY MEDICINE CLINIC | Age: 65
End: 2020-09-08

## 2020-09-08 NOTE — TELEPHONE ENCOUNTER
Pt says you had given him Cialis and it is not really working for him. Also when he gets an erection, \"it is crooked\". Please advise.

## 2020-09-08 NOTE — TELEPHONE ENCOUNTER
Pt called office left vm stating he needs to see a Urologist and wants a referral. His son saw Dr. Flaquito Lamas at [facility] and would like to see him as well. Please advise.

## 2020-09-11 ENCOUNTER — HOSPITAL ENCOUNTER (OUTPATIENT)
Dept: CT IMAGING | Age: 65
Discharge: HOME | End: 2020-09-11
Payer: MEDICARE

## 2020-09-11 PROCEDURE — 72125 CT NECK SPINE W/O DYE: CPT

## 2020-09-15 ENCOUNTER — PROCEDURE VISIT (OUTPATIENT)
Dept: NEUROLOGY | Age: 65
End: 2020-09-15
Payer: MEDICARE

## 2020-09-15 PROCEDURE — 95909 NRV CNDJ TST 5-6 STUDIES: CPT | Performed by: PSYCHIATRY & NEUROLOGY

## 2020-09-15 PROCEDURE — 95886 MUSC TEST DONE W/N TEST COMP: CPT | Performed by: PSYCHIATRY & NEUROLOGY

## 2020-09-16 ENCOUNTER — OFFICE VISIT (OUTPATIENT)
Dept: UROLOGY | Age: 65
End: 2020-09-16
Payer: MEDICARE

## 2020-09-16 VITALS — WEIGHT: 198 LBS | TEMPERATURE: 97.5 F | BODY MASS INDEX: 28.35 KG/M2 | HEIGHT: 70 IN

## 2020-09-16 LAB
BILIRUBIN URINE: NEGATIVE
BLOOD URINE, POC: ABNORMAL
CHARACTER, URINE: CLEAR
COLOR, URINE: YELLOW
GLUCOSE URINE: NEGATIVE MG/DL
KETONES, URINE: NEGATIVE
LEUKOCYTE CLUMPS, URINE: NEGATIVE
NITRITE, URINE: NEGATIVE
PH, URINE: 5.5 (ref 5–9)
PROTEIN, URINE: NEGATIVE MG/DL
SPECIFIC GRAVITY, URINE: 1.01 (ref 1–1.03)
UROBILINOGEN, URINE: 0.2 EU/DL (ref 0–1)

## 2020-09-16 PROCEDURE — G8427 DOCREV CUR MEDS BY ELIG CLIN: HCPCS | Performed by: UROLOGY

## 2020-09-16 PROCEDURE — 1123F ACP DISCUSS/DSCN MKR DOCD: CPT | Performed by: UROLOGY

## 2020-09-16 PROCEDURE — 81003 URINALYSIS AUTO W/O SCOPE: CPT | Performed by: UROLOGY

## 2020-09-16 PROCEDURE — 99204 OFFICE O/P NEW MOD 45 MIN: CPT | Performed by: UROLOGY

## 2020-09-16 PROCEDURE — 1036F TOBACCO NON-USER: CPT | Performed by: UROLOGY

## 2020-09-16 PROCEDURE — 4040F PNEUMOC VAC/ADMIN/RCVD: CPT | Performed by: UROLOGY

## 2020-09-16 PROCEDURE — G8417 CALC BMI ABV UP PARAM F/U: HCPCS | Performed by: UROLOGY

## 2020-09-16 PROCEDURE — 3017F COLORECTAL CA SCREEN DOC REV: CPT | Performed by: UROLOGY

## 2020-09-16 RX ORDER — SILDENAFIL 100 MG/1
100 TABLET, FILM COATED ORAL DAILY PRN
Qty: 30 TABLET | Refills: 1 | Status: SHIPPED | OUTPATIENT
Start: 2020-09-16 | End: 2022-10-10 | Stop reason: SDUPTHER

## 2020-09-16 NOTE — PROGRESS NOTES
Florida Quiñones MD        [address] [place] [phone]  Dept: [phone]  Dept Fax: [phone]: [address] [address] Urology Office Note -     Patient:  Elvia Reyes  YOB: 1955  Date: 10/15/2020    The patient is a 72 y.o. male who presents today for evaluation of the following problems:   Chief Complaint   Patient presents with    Advice Only     New Patient, ED, referred by Rachel Nicolas CNP    referred/consultation requested by KATIE Velasquez CNP. HISTORY OF PRESENT ILLNESS:     ED  Onset was  Years ago  Overall, the problem(s) are worse. Severity is described as moderate. Associated Symptoms: No dysuria, no gross hematuria. Current Pain Severity:0    Worsening. Has tried cialis 20 mg. Has good libido  Interested in trying viagra    Secondary Diagnosis:    peyronie's-  Unsure of onset of symptoms. painless. Bends to right. Unsure of degree curvature    Hematospermia- resolved    Summary of Previous Records:  Elvia Reyes is a 61 y.o. with past medical history of Hypertension, hyperlipidemia, coronary artery disease who present today for blood in semen. Onset of symptoms occurred 1 month ago, still present. He reports semen's right red colored. He started out as a dark, brown color. He denies any pain with ejaculation. He denies any urinary changes. He denies frequency, urgency, nocturia, weak stream, intermittent stream. He has never seen blood in his urine. Trend of PSA:  0.72 09/2018  There is no family history of prostate cancer. He comes in today by himself. Hx is obtained from the patient and medical record.      Requested/reviewed records from KATIE Velasquez CNP office and/or outside [de-identified]    (Patient's old records have been requested, reviewed and pertinent findings summarized in today's note.)    Procedures Today: N/A      Last several PSA's:  Lab Results   Component Value Date    PSA 0.72 09/13/2018       Last total testosterone:  No results found for: TESTOSTERONE    Urinalysis today:  Results for POC orders placed in visit on 09/16/20   POCT Urinalysis No Micro (Auto)   Result Value Ref Range    Glucose, Ur Negative NEGATIVE mg/dl    Bilirubin Urine Negative     Ketones, Urine Negative NEGATIVE    Specific Gravity, Urine 1.015 1.002 - 1.030    Blood, UA POC Small (A) NEGATIVE    pH, Urine 5.50 5.0 - 9.0    Protein, Urine Negative NEGATIVE mg/dl    Urobilinogen, Urine 0.20 0.0 - 1.0 eu/dl    Nitrite, Urine Negative NEGATIVE    Leukocyte Clumps, Urine Negative NEGATIVE    Color, Urine Yellow [id number]    Character, Urine Clear [id number].[id number]       Last BUN and creatinine:  Lab Results   Component Value Date    BUN 15 08/10/2020     Lab Results   Component Value Date    CREATININE 1.0 08/10/2020         Imaging Reviewed during this Office Visit:   John Matias MD independently reviewed the images and verified the radiology reports from:    Ct Cervical Spine Wo Contrast    Result Date: 9/11/2020  PROCEDURE: CT CERVICAL SPINE WO CONTRAST CLINICAL INFORMATION: Numbness of left hand, Left hand weakness, Gait instability, Spondylosis of cervical spine. COMPARISON: MRI cervical spine dated 8/10/2020. TECHNIQUE: 3 mm noncontrast axial images were obtained through the cervical spine with sagittal and coronal reconstructions. All CT scans at this facility use dose modulation, iterative reconstruction, and/or weight-based dosing when appropriate to reduce radiation dose to as low as reasonably achievable. FINDINGS: There is a dextrocurvature of the cervical spine, stable compared to prior MRI. There is a mild rotational component. There is otherwise anatomic vertebral body height and alignment. No fracture of the cervical vertebral column is identified. The craniocervical junction appears intact. No paraspinal or epidural fluid collection is identified.  There is a mildly prominent level 1A lymph node measuring 7 mm in greatest short axis diameter. There are scattered cervical chain lymph nodes elsewhere without definite cervical lymphadenopathy. Paraspinal soft tissues are otherwise grossly unremarkable. At C2-3 there is no significant spinal canal stenosis. There is severe left neuroforaminal stenosis in association with uncovertebral joint degenerative change and facet hypertrophy. At C3-4 there is no significant spinal canal stenosis. There is moderate left and severe right neural foraminal stenosis in association with uncovertebral joint degenerative change and facet hypertrophy. At C4-5 there is a shallow disc bulge with rim osteophytes which contributes to mild spinal canal stenosis and moderate left neuroforaminal stenosis in association with uncovertebral joint degenerative change and facet hypertrophy. At C5-6 there is a shallow disc bulge without significant spinal canal stenosis and mild bilateral neural foraminal stenosis in association with uncovertebral joint degenerative change. At C6-7 there is a shallow disc bulge without significant spinal canal stenosis and mild left and moderate right neural frontal stenosis in association with uncovertebral joint degenerative change. At C7-T1 there is no significant spinal canal or neuroforaminal stenosis. 1. Dextroscoliosis of the cervical spine with superimposed degenerative changes with areas of up to severe neural foraminal stenosis at the C2-3 and C3 levels. 2. Mildly prominent level 1A lymph node. Recommend close clinical follow-up with consideration of cytologic evaluation if persistent/increased. **This report has been created using voice recognition software. It may contain minor errors which are inherent in voice recognition technology. ** Final report electronically signed by Dr. Triny Ortiz MD on 9/11/2020 5:13 PM      PAST MEDICAL, FAMILY AND SOCIAL HISTORY:  Past Medical History:   Diagnosis Date    Arthritis     Chronic diastolic CHF (congestive heart failure) (HCC)     Coronary artery disease involving native coronary artery of native heart without angina pectoris 7/10/2018    Hyperlipidemia     Hypertension     Liver disease     Hep A 18 years ago    Malignant neoplasm of thyroid gland ([address] 3/15/2019     Past Surgical History:   Procedure Laterality Date    CARPAL TUNNEL RELEASE      CHOLECYSTECTOMY      HERNIA REPAIR      THYROIDECTOMY      TONSILLECTOMY AND ADENOIDECTOMY      TOTAL KNEE ARTHROPLASTY Left      Family History   Problem Relation Age of Onset    Heart Disease Mother     High Blood Pressure Mother     High Cholesterol Mother     Arthritis Mother     Miscarriages / Thera Has Mother     Heart Disease Father     Arthritis Father     Cancer Brother         melanoma     Outpatient Medications Marked as Taking for the 9/16/20 encounter (Office Visit) with Susie Hollins MD   Medication Sig Dispense Refill    sildenafil (VIAGRA) 100 MG tablet Take 1 tablet by mouth daily as needed for Erectile Dysfunction 30 tablet 1    Multiple Vitamins-Minerals (MENS 50+ MULTI VITAMIN/MIN) TABS Take by mouth daily      acetaminophen (TYLENOL) 500 MG tablet Take 1 tablet by mouth 4 times daily as needed for Pain (for neck pain and headaches) 360 tablet 1    atorvastatin (LIPITOR) 40 MG tablet TAKE 1 TABLET BY MOUTH DAILY 30 tablet 11    metoprolol succinate (TOPROL XL) 25 MG extended release tablet TAKE 1 TABLET BY MOUTH DAILY 30 tablet 11    clopidogrel (PLAVIX) 75 MG tablet TAKE 1 TABLET BY MOUTH DAILY 30 tablet 11    aspirin 81 MG chewable tablet CHEW AND SWALLOW 1 TABLET BY MOUTH DAILY 30 tablet 11    nitroGLYCERIN (NITROSTAT) 0.4 MG SL tablet Take 1 tablet at onset of chest pain, if no relief in 5 min may repeat x 2 then call 911 25 tablet 3    levothyroxine (SYNTHROID) 125 MCG tablet          Pepto-bismol [bismuth subsalicylate]  Social History     Tobacco Use   Smoking Status Never Smoker   Smokeless Tobacco Never Used      (If patient a smoker, smoking cessation counseling offered)   Social History     Substance and Sexual Activity   Alcohol Use No       REVIEW OF SYSTEMS:  Constitutional: negative  Eyes: negative  Respiratory: negative  Cardiovascular: negative  Gastrointestinal: negative  Genitourinary: see HPI  Musculoskeletal: negative  Skin: negative   Neurological: negative  Hematological/Lymphatic: negative  Psychological: negative        Physical Exam:    This a 72 y.o. male  Vitals:    09/16/20 1328   Temp: 97.5 °F (36.4 °C)     Body mass index is 28.82 kg/m². Constitutional: Patient in no acute distress; Neuro: alert and oriented to person place and time. Psych: Mood and affect normal.  Skin: warm, dry  Lungs: Respiratory effort normal, Symmetric chest rise  Cardiovascular:  Normal peripheral pulses; Regular rate, radial pulses palpable  Abdomen: Soft, non-tender, non-distended with no CVA, flank pain, hepatosplenomegaly or hernia. Kidneys normal.  Bladder non-tender and not distended. Lymphatics: no palpable lymphadenopathy  Minimal/no edema in bilateral lower extremities        Assessment and Plan        1. Erectile dysfunction, unspecified erectile dysfunction type    2. Peyronie's disease    3. Hematospermia               Plan:        Follow up in six weeks with PSA and testosterone  Pt is on nitrate therapy. Never had to take this.  Does not take this  Discussed contraindication with nitrate  Will prescribe Sildenafil 100 mg  Check testosterone      Prescriptions Ordered:  Orders Placed This Encounter   Medications    sildenafil (VIAGRA) 100 MG tablet     Sig: Take 1 tablet by mouth daily as needed for Erectile Dysfunction     Dispense:  30 tablet     Refill:  1      Orders Placed:  Orders Placed This Encounter   Procedures    Testosterone    PSA, Diagnostic     Standing Status:   Future     Standing Expiration Date:   9/16/2021    POCT Urinalysis No Micro (Auto)            YASSINE Lopez MD

## 2020-09-17 ENCOUNTER — TELEPHONE (OUTPATIENT)
Dept: UROLOGY | Age: 65
End: 2020-09-17

## 2020-09-17 NOTE — TELEPHONE ENCOUNTER
Patient stated he would like the sildenafil transferred to [facility]. Spoke to Joselin Gamboa at [facility], she stated she would transfer it and he could pick it up tomorrow.

## 2020-10-23 ENCOUNTER — HOSPITAL ENCOUNTER (OUTPATIENT)
Age: 65
Discharge: HOME | End: 2020-10-23
Payer: MEDICARE

## 2020-10-23 LAB — PROSTATE SPECIFIC ANTIGEN: 0.51 NG/ML (ref 0–1)

## 2020-10-23 PROCEDURE — 84403 ASSAY OF TOTAL TESTOSTERONE: CPT

## 2020-10-23 PROCEDURE — 36415 COLL VENOUS BLD VENIPUNCTURE: CPT

## 2020-10-23 PROCEDURE — 84153 ASSAY OF PSA TOTAL: CPT

## 2020-10-25 LAB — TESTOSTERONE TOTAL: 494 NG/DL (ref 300–720)

## 2020-10-27 ENCOUNTER — OFFICE VISIT (OUTPATIENT)
Dept: UROLOGY | Age: 65
End: 2020-10-27
Payer: MEDICARE

## 2020-10-27 VITALS — WEIGHT: 201.8 LBS | TEMPERATURE: 97.8 F | HEIGHT: 69 IN | BODY MASS INDEX: 29.89 KG/M2

## 2020-10-27 PROCEDURE — 99214 OFFICE O/P EST MOD 30 MIN: CPT | Performed by: UROLOGY

## 2020-10-27 PROCEDURE — 1123F ACP DISCUSS/DSCN MKR DOCD: CPT | Performed by: UROLOGY

## 2020-10-27 PROCEDURE — 1036F TOBACCO NON-USER: CPT | Performed by: UROLOGY

## 2020-10-27 PROCEDURE — G8417 CALC BMI ABV UP PARAM F/U: HCPCS | Performed by: UROLOGY

## 2020-10-27 PROCEDURE — 4040F PNEUMOC VAC/ADMIN/RCVD: CPT | Performed by: UROLOGY

## 2020-10-27 PROCEDURE — G8484 FLU IMMUNIZE NO ADMIN: HCPCS | Performed by: UROLOGY

## 2020-10-27 PROCEDURE — G8427 DOCREV CUR MEDS BY ELIG CLIN: HCPCS | Performed by: UROLOGY

## 2020-10-27 PROCEDURE — 3017F COLORECTAL CA SCREEN DOC REV: CPT | Performed by: UROLOGY

## 2020-10-27 NOTE — PROGRESS NOTES
Solo Hamilton MD        [address] [place] [address],[address] [place] [facility] [phone]  Dept: [phone]  Dept Fax: [phone]: [address] [address] Urology Office Note -     Patient:  Gerber Jj  YOB: 1955  Date: 11/8/2020    The patient is a 72 y.o. male who presents today for evaluation of the following problems:   Chief Complaint   Patient presents with    Erectile Dysfunction    Follow-up     PSA and testosterone f/u    referred/consultation requested by KATIE Garcia CNP. HISTORY OF PRESENT ILLNESS:     ED  Onset was  Years ago  Overall, the problem(s) are worse. Severity is described as moderate. Associated Symptoms: No dysuria, no gross hematuria. Current Pain Severity:0    Worsening. Has tried cialis 20 mg. Has good libido  Viagra helped with him getting erect, but was unable to maintain long enough to ejaculate. We discussed trimix- at this time he is not interested. Secondary Diagnosis:    peyronie's-  Unsure of onset of symptoms. painless. Bends to right. Unsure of degree curvature    Hematospermia- resolved    Testosterone and PSA are wnl. Summary of Previous Records:  Gerber Jj is a 61 y.o. with past medical history of Hypertension, hyperlipidemia, coronary artery disease who present today for blood in semen. Onset of symptoms occurred 1 month ago, still present. He reports semen's right red colored. He started out as a dark, brown color. He denies any pain with ejaculation. He denies any urinary changes. He denies frequency, urgency, nocturia, weak stream, intermittent stream. He has never seen blood in his urine. Trend of PSA:  0.72 09/2018  There is no family history of prostate cancer. He comes in today by himself. Hx is obtained from the patient and medical record.      Requested/reviewed records from KATIE Garcia CNP office and/or outside [de-identified]    (Patient's old records have been requested, reviewed and pertinent findings summarized in today's note.)    Procedures Today: N/A      Last several PSA's:  Lab Results   Component Value Date    PSA 0.51 10/23/2020    PSA 0.72 09/13/2018       Last total testosterone:  Lab Results   Component Value Date    TESTOSTERONE 494 10/23/2020       Urinalysis today:  No results found for this visit on 10/27/20. Last BUN and creatinine:  Lab Results   Component Value Date    BUN 15 08/10/2020     Lab Results   Component Value Date    CREATININE 1.0 08/10/2020         Imaging Reviewed during this Office Visit:   Elle Hines MD independently reviewed the images and verified the radiology reports from:    Ct Cervical Spine Wo Contrast    Result Date: 9/11/2020  PROCEDURE: CT CERVICAL SPINE WO CONTRAST CLINICAL INFORMATION: Numbness of left hand, Left hand weakness, Gait instability, Spondylosis of cervical spine. COMPARISON: MRI cervical spine dated 8/10/2020. TECHNIQUE: 3 mm noncontrast axial images were obtained through the cervical spine with sagittal and coronal reconstructions. All CT scans at this facility use dose modulation, iterative reconstruction, and/or weight-based dosing when appropriate to reduce radiation dose to as low as reasonably achievable. FINDINGS: There is a dextrocurvature of the cervical spine, stable compared to prior MRI. There is a mild rotational component. There is otherwise anatomic vertebral body height and alignment. No fracture of the cervical vertebral column is identified. The craniocervical junction appears intact. No paraspinal or epidural fluid collection is identified. There is a mildly prominent level 1A lymph node measuring 7 mm in greatest short axis diameter. There are scattered cervical chain lymph nodes elsewhere without definite cervical lymphadenopathy. Paraspinal soft tissues are otherwise grossly unremarkable.  At C2-3 there is no significant spinal canal stenosis. There is severe left neuroforaminal stenosis in association with uncovertebral joint degenerative change and facet hypertrophy. At C3-4 there is no significant spinal canal stenosis. There is moderate left and severe right neural foraminal stenosis in association with uncovertebral joint degenerative change and facet hypertrophy. At C4-5 there is a shallow disc bulge with rim osteophytes which contributes to mild spinal canal stenosis and moderate left neuroforaminal stenosis in association with uncovertebral joint degenerative change and facet hypertrophy. At C5-6 there is a shallow disc bulge without significant spinal canal stenosis and mild bilateral neural foraminal stenosis in association with uncovertebral joint degenerative change. At C6-7 there is a shallow disc bulge without significant spinal canal stenosis and mild left and moderate right neural frontal stenosis in association with uncovertebral joint degenerative change. At C7-T1 there is no significant spinal canal or neuroforaminal stenosis. 1. Dextroscoliosis of the cervical spine with superimposed degenerative changes with areas of up to severe neural foraminal stenosis at the C2-3 and C3 levels. 2. Mildly prominent level 1A lymph node. Recommend close clinical follow-up with consideration of cytologic evaluation if persistent/increased. **This report has been created using voice recognition software. It may contain minor errors which are inherent in voice recognition technology. ** Final report electronically signed by Dr. Deepthi Thao MD on 9/11/2020 5:13 PM      PAST MEDICAL, FAMILY AND SOCIAL HISTORY:  Past Medical History:   Diagnosis Date    Arthritis     Chronic diastolic [facility]congestive [facility] failure) [facility]     Coronary artery disease involving native coronary artery of native heart without angina pectoris 7/10/2018    Hyperlipidemia     Hypertension     Liver disease     Hep A 18 years ago    Malignant neoplasm of [facility] gland ([facility] [address] 3/15/2019     Past Surgical History:   Procedure Laterality Date    CARPAL TUNNEL RELEASE      CHOLECYSTECTOMY      HERNIA REPAIR      THYROIDECTOMY      TONSILLECTOMY AND ADENOIDECTOMY      TOTAL KNEE ARTHROPLASTY Left      Family History   Problem Relation Age of Onset    Heart Disease Mother     High Blood Pressure Mother     High Cholesterol Mother     Arthritis Mother     Miscarriages / Stillbirths Mother     Heart Disease Father     Arthritis Father     Cancer Brother         melanoma     Outpatient Medications Marked as Taking for the 10/27/20 encounter (Office Visit) with Doc Mora MD   Medication Sig Dispense Refill    sildenafil (VIAGRA) 100 MG tablet Take 1 tablet by mouth daily as needed for Erectile Dysfunction 30 tablet 1    Multiple Vitamins-Minerals (MENS 50+ MULTI VITAMIN/MIN) TABS Take by mouth daily      acetaminophen (TYLENOL) 500 MG tablet Take 1 tablet by mouth 4 times daily as needed for Pain (for neck pain and headaches) 360 tablet 1    topiramate (TOPAMAX) 50 MG tablet TAKE 1 TABLET BY MOUTH EVERY NIGHT 90 tablet 1    atorvastatin (LIPITOR) 40 MG tablet TAKE 1 TABLET BY MOUTH DAILY 30 tablet 11    metoprolol succinate (TOPROL XL) 25 MG extended release tablet TAKE 1 TABLET BY MOUTH DAILY 30 tablet 11    clopidogrel (PLAVIX) 75 MG tablet TAKE 1 TABLET BY MOUTH DAILY 30 tablet 11    aspirin 81 MG chewable tablet CHEW AND SWALLOW 1 TABLET BY MOUTH DAILY 30 tablet 11    nitroGLYCERIN (NITROSTAT) 0.4 MG SL tablet Take 1 tablet at onset of chest pain, if no relief in 5 min may repeat x 2 then call 911 25 tablet 3    levothyroxine (SYNTHROID) 125 MCG tablet          Pepto-bismol [bismuth subsalicylate]  Social History     Tobacco Use   Smoking Status Never Smoker   Smokeless Tobacco Never Used      (If patient a smoker, smoking cessation counseling offered)   Social History     Substance and Sexual Activity   Alcohol Use No       REVIEW OF SYSTEMS:  Constitutional: negative  Eyes: negative  Respiratory: negative  Cardiovascular: negative  Gastrointestinal: negative  Genitourinary: see HPI  Musculoskeletal: negative  Skin: negative   Neurological: negative  Hematological/Lymphatic: negative  Psychological: negative        Physical Exam:    This a 72 y.o. male  Vitals:    10/27/20 1420   Temp: 97.8 °F (36.6 °C)     Body mass index is 29.8 kg/m². Constitutional: Patient in no acute distress;     Assessment and Plan        1. Erectile dysfunction, unspecified erectile dysfunction type    2. Peyronie's disease    3. Hematospermia    4. Prostate cancer screening               Plan:      ED-Continue viagra. Not interested in trimix at this time  peyronies- can penetrate without pain at this time  Hematospermia- self limiting. resolved  Return in a year with a PSA prior. Prescriptions Ordered:  No orders of the defined types were placed in this encounter.      Orders Placed:  Orders Placed This Encounter   Procedures    PSA Prostatic Specific Antigen     Standing Status:   Future     Standing Expiration Date:   12/27/2021            Gail Nichols MD

## 2020-11-03 PROBLEM — R55 SYNCOPE AND COLLAPSE: Status: RESOLVED | Noted: 2018-03-16 | Resolved: 2020-11-03

## 2020-11-17 ENCOUNTER — OFFICE VISIT (OUTPATIENT)
Dept: NEUROSURGERY | Age: 65
End: 2020-11-17
Payer: MEDICARE

## 2020-11-17 VITALS
HEIGHT: 69 IN | HEART RATE: 62 BPM | WEIGHT: 198.2 LBS | SYSTOLIC BLOOD PRESSURE: 140 MMHG | BODY MASS INDEX: 29.36 KG/M2 | TEMPERATURE: 97.5 F | DIASTOLIC BLOOD PRESSURE: 95 MMHG

## 2020-11-17 PROCEDURE — G8484 FLU IMMUNIZE NO ADMIN: HCPCS | Performed by: PHYSICIAN ASSISTANT

## 2020-11-17 PROCEDURE — 3017F COLORECTAL CA SCREEN DOC REV: CPT | Performed by: PHYSICIAN ASSISTANT

## 2020-11-17 PROCEDURE — 4040F PNEUMOC VAC/ADMIN/RCVD: CPT | Performed by: PHYSICIAN ASSISTANT

## 2020-11-17 PROCEDURE — 1123F ACP DISCUSS/DSCN MKR DOCD: CPT | Performed by: PHYSICIAN ASSISTANT

## 2020-11-17 PROCEDURE — 99214 OFFICE O/P EST MOD 30 MIN: CPT | Performed by: PHYSICIAN ASSISTANT

## 2020-11-17 PROCEDURE — 1036F TOBACCO NON-USER: CPT | Performed by: PHYSICIAN ASSISTANT

## 2020-11-17 PROCEDURE — G8427 DOCREV CUR MEDS BY ELIG CLIN: HCPCS | Performed by: PHYSICIAN ASSISTANT

## 2020-11-17 PROCEDURE — G8417 CALC BMI ABV UP PARAM F/U: HCPCS | Performed by: PHYSICIAN ASSISTANT

## 2020-11-17 NOTE — PROGRESS NOTES
[address] [place] [address],[address]  Dept: [phone]  Dept Fax: [phone]  Loc: [facility] Follow Visit  Visit Date: 11/17/2020      Susanna Osorio  is a 72 y.o. male who is returning to the office today for a follow-up visit continuing evaluation of cervical pain with radiation to the left arm that includes weakness and numbness of the left hand. Patient was last seen and evaluated in the office setting by Dr. Jose Wilson on 8/27/2020 he presented with an MRI of the cervical spine that showed multilevel degenerative changes most pronounced at C4-5 with a shallow disc bulge and osteophytes causing mild spinal canal stenosis and moderate right and severe left neuroforaminal stenosis. Time of the last evaluation patient was complaining of significant numbness in the hands with numbness in the left hand described as transient. Of note, patient has a history significant for left carpal tunnel release. Is intact for strength and sensation at his last visit. A referral to neurology for an EMG NCV was processed at the last visit along with a CT scan of the cervical spine for review. Since with a CT scan of the cervical spine imaged on 9/11/2020 which reveals some dextroscoliosis of the cervical spine with degenerative changes superimposed with areas of severe neuroforaminal stenosis at C2-3 and 3 4 levels. EMG/NCV's performed by neurology are significant for C5-6 radiculopathy and mild to moderate carpal tunnel syndrome for the left hand. He presents today unaccompanied and without relating any significant changes to his symptom presentation. He relates some cervical discomfort with radiation primarily into the left upper extremity to the hands with transient numbness and tingling and weakness with difficulty for for some fine motor skills and for grasping and holding an object for any length of time.   Physical exam he demonstrated 5/5 strength for the upper extremity groups bilaterally and was intact to pinprick sensation symmetrically the only exception involved thumb finger opposition weakness on the left hand. He denies having undergone any injection therapies but is agreeable to do so in hopes of avoiding surgery. We reviewed imaging as well as EMG/NCV studies and results and have agreed to refer him to pain management for evaluation for injection therapies with return to office follow-up appointment in 3 to 4 weeks to ascertain any improvement from those therapies. He remains very happy with the outcome of today's appointment with the majority of his questions and concerns addressed and answered.     · Patient was evaluated today and is doing marginally overall. · No new complaints were voiced. · Patient  lives with their family  · Wound: none  · Follow-up Studies: No orders of the defined types were placed in this encounter. ·      Assessment/Plan:  · Status Post in [place] evaluation of radiating cervical pain with left hand weakness and pain. · Doing marginally overall  · Encouraged gradual increase in physical and mental activity. · Fall precaution and home safety education provided to patient. · Follow-up: We are referring him to pain management for evaluation for injection therapies with return to office follow-up appointment to be scheduled in 3 to 4 weeks to ascertain any improvements from those therapies before considering any surgical intervention. He is happy with today's visit having his questions concerns addressed and answered and is encouraged to reach out to our office with any additional questions or concerns or should experience any significant changes in his general presentation.       Electronically signed by Frederick Plascencia PA-C on 11/17/20 at 1:59 PM EST

## 2020-12-15 ENCOUNTER — OFFICE VISIT (OUTPATIENT)
Dept: CARDIOLOGY CLINIC | Age: 65
End: 2020-12-15
Payer: MEDICARE

## 2020-12-15 VITALS
HEIGHT: 69 IN | HEART RATE: 63 BPM | BODY MASS INDEX: 29.27 KG/M2 | SYSTOLIC BLOOD PRESSURE: 130 MMHG | WEIGHT: 197.6 LBS | DIASTOLIC BLOOD PRESSURE: 80 MMHG

## 2020-12-15 PROBLEM — I20.9 ANGINA PECTORIS (HCC): Status: ACTIVE | Noted: 2020-12-15

## 2020-12-15 PROCEDURE — G8417 CALC BMI ABV UP PARAM F/U: HCPCS | Performed by: INTERNAL MEDICINE

## 2020-12-15 PROCEDURE — G8427 DOCREV CUR MEDS BY ELIG CLIN: HCPCS | Performed by: INTERNAL MEDICINE

## 2020-12-15 PROCEDURE — 1123F ACP DISCUSS/DSCN MKR DOCD: CPT | Performed by: INTERNAL MEDICINE

## 2020-12-15 PROCEDURE — 1036F TOBACCO NON-USER: CPT | Performed by: INTERNAL MEDICINE

## 2020-12-15 PROCEDURE — 4040F PNEUMOC VAC/ADMIN/RCVD: CPT | Performed by: INTERNAL MEDICINE

## 2020-12-15 PROCEDURE — G8484 FLU IMMUNIZE NO ADMIN: HCPCS | Performed by: INTERNAL MEDICINE

## 2020-12-15 PROCEDURE — 99214 OFFICE O/P EST MOD 30 MIN: CPT | Performed by: INTERNAL MEDICINE

## 2020-12-15 PROCEDURE — 3017F COLORECTAL CA SCREEN DOC REV: CPT | Performed by: INTERNAL MEDICINE

## 2020-12-15 NOTE — PROGRESS NOTES
No chief complaint on file. Originally  patient here - ref by Dr. Theron Pérez - NSTEMI  referred from [facility] post Thyriod surgery  He sufferred NSTEMI post op in [facility]    Patient presents in office today for a 6mth fu.      No dizziness    Denied cp, sob, dizziness, swelling or palpitations    Reviewed the record from [facility]          Patient Active Problem List   Diagnosis    Tinnitus    Hearing loss d/t noise    Essential hypertension    Impaired fasting blood sugar    Primary osteoarthritis of left knee    Liver disease    Near syncope    Mixed dyslipidemia    Postural dizziness with presyncope    Orthostatic hypotension    S/P angioplasty with stent of the LM to LCX with 100% lad lesion and mod rca lesion- med RX 06/04/2018    Coronary artery disease involving native coronary artery of native heart without angina pectoris    Non-ST elevation (NSTEMI) myocardial infarction (HCC)    Malignant neoplasm of thyroid gland (HCC)    Gait instability    Left-sided weakness    Otorrhagia of left ear    Hyperlipidemia    Chronic diastolic CHF (congestive heart failure) (HCC)    History of thyroid cancer    Chronic nonintractable headache    Angina pectoris (HCC)       Past Surgical History:   Procedure Laterality Date    CARPAL TUNNEL RELEASE      CHOLECYSTECTOMY      HERNIA REPAIR      THYROIDECTOMY      TONSILLECTOMY AND ADENOIDECTOMY      TOTAL KNEE ARTHROPLASTY Left        Allergies   Allergen Reactions    Pepto-Bismol [Bismuth Subsalicylate] Nausea And Vomiting        Family History   Problem Relation Age of Onset    Heart Disease Mother     High Blood Pressure Mother     High Cholesterol Mother     Arthritis Mother     Justo Whitney / Stillbirths Mother     Heart Disease Father     Arthritis Father     Cancer Brother         melanoma        Social History     Socioeconomic History    Marital status:      Spouse name: Mandy Daniel Number of children: 4    Years of education: 15    Highest education level: Some college, no degree   Occupational History    Not on file   Social Needs    Financial resource strain: Somewhat hard    Food insecurity     Worry: Never true     Inability: Never true    Transportation needs     Medical: No     Non-medical: No   Tobacco Use    Smoking status: Never Smoker    Smokeless tobacco: Never Used   Substance and Sexual Activity    Alcohol use: No    Drug use: No    Sexual activity: Yes     Partners: Female   Lifestyle    Physical activity     Days per week: Not on file     Minutes per session: Not on file    Stress: Not on file   Relationships    Social connections     Talks on phone: Not on file     Gets together: Not on file     Attends Nondenominational service: Not on file     Active member of club or organization: Not on file     Attends meetings of clubs or organizations: Not on file     Relationship status: Not on file    Intimate partner violence     Fear of current or ex partner: Not on file     Emotionally abused: Not on file     Physically abused: Not on file     Forced sexual activity: Not on file   Other Topics Concern    Not on file   Social History Narrative    Not on file       Current Outpatient Medications   Medication Sig Dispense Refill    sildenafil (VIAGRA) 100 MG tablet Take 1 tablet by mouth daily as needed for Erectile Dysfunction 30 tablet 1    Multiple Vitamins-Minerals (MENS 50+ MULTI VITAMIN/MIN) TABS Take by mouth daily      acetaminophen (TYLENOL) 500 MG tablet Take 1 tablet by mouth 4 times daily as needed for Pain (for neck pain and headaches) 360 tablet 1    topiramate (TOPAMAX) 50 MG tablet TAKE 1 TABLET BY MOUTH EVERY NIGHT 90 tablet 1    atorvastatin (LIPITOR) 40 MG tablet TAKE 1 TABLET BY MOUTH DAILY 30 tablet 11    metoprolol succinate (TOPROL XL) 25 MG extended release tablet TAKE 1 TABLET BY MOUTH DAILY 30 tablet 11    clopidogrel (PLAVIX) 75 MG tablet TAKE 1 TABLET BY MOUTH DAILY 30 tablet 11    aspirin 81 27.9 08/10/2020    MCHC 32.1 08/10/2020    RDW 15.6 03/17/2018     08/10/2020    MPV 10.0 08/10/2020     BMP:    Lab Results   Component Value Date     08/10/2020    K 3.9 08/10/2020     08/10/2020    CO2 20 08/10/2020    BUN 15 08/10/2020    LABALBU 4.2 05/29/2020    CREATININE 1.0 08/10/2020    CALCIUM 8.0 08/10/2020    LABGLOM 75 08/10/2020    GLUCOSE 111 08/10/2020     Hepatic Function Panel:    Lab Results   Component Value Date    ALKPHOS 102 05/29/2020    ALT 21 05/29/2020    AST 16 05/29/2020    PROT 6.8 05/29/2020    BILITOT 0.6 05/29/2020    BILIDIR <0.2 05/29/2020    LABALBU 4.2 05/29/2020     Magnesium:    Lab Results   Component Value Date    MG 2.2 08/10/2020     Warfarin PT/INR:  No components found for: PTPATWAR, PTINRWAR  HgBA1c:    Lab Results   Component Value Date    LABA1C 6.2 08/10/2020     FLP:    Lab Results   Component Value Date    TRIG 138 08/10/2020    HDL 46 08/10/2020    LDLCALC 56 08/10/2020     TSH:    Lab Results   Component Value Date    TSH 0.284 08/10/2020     06/04/2018 post op cath at [facility] summary:  · There is 100% chronic total occlusion for the LAD with R to L   collaterals  · There is 100% acute thrombotic occlusion of the left main to LCx   stenosis that represents culprit lesion  · Moderate non obstructive disease in RCA with R to L collaterals  · LVEDP mildly elevated at 15 mmHg; No LV-AO gradient on pull back    Intervention summary:  · Complex PCI of left main-LCx using OTW. Femoral access was obtained at   beginning of procedure in anticipation of possible hemodynamic support  · The left main-LCx lesion was pre-dilated (Mimetas [id number]) and   treated with BMSx1 ( BMS INTEGRITY 3.50 X 18). After deployment there was   no residual stenosis, no evidence of dissection and ASHA III flow   distally. Stent was then post dilated (BALL 3.5 X 12 RX NC EUPHO)    Recommendations  · Aspirin indefinitely.  Plavix for at least 1 month given, preferably   longer given NSTEMI  · Transfer to Heart 3 team for further care    Echo 06/04/2018    Indication:  S/P NSTEMI, preop CABG        Findings   L Ventricle: The left ventricle is enlarged. There is no left ventricular  hypertrophy. The left ventricular septal wall appears aneurysmal.  There  is diffuse global hypokinesis. The estimated ejection fraction is  55-60%, consistent with normal systolic function. The left ventricular  biplane EF is 55%. EF by 3D echo was 55%. There is an E to A reversal in  the mitral valve flow pattern suggestive of diastolic dysfunction. The   basal anterolateral, and  mid anterolateral wall segments are  hypokinetic (score 2). Overall wallmotion score index is  1.13 The  global longitudinal strain was -16.8%. EKG 7/10/18  Sinus  Rhythm   WITHIN NORMAL LIMITS    ekg  9/30/19  NSR, No acute abn  TW abn    Conclusions      Summary   Normal left ventricle size and systolic function. Ejection fraction was   estimated at 55 %. There were no regional left ventricular wall motion   abnormalities and wall thickness was within normal limits. Doppler parameters were consistent with abnormal left ventricular   relaxation (grade 1 diastolic dysfunction). The left atrium is Mildly dilated. Signature      ----------------------------------------------------------------   Electronically signed by Lianne Hendricks MD (Interpreting   physician) on 08/10/2020 at 05:45 PM   ----------------------------------------------------------------      ekg  08/2020  Normal sinus rhythm  Minimal voltage criteria for LVH, may be normal variant  Septal infarct (cited on or before 30-SEP-2018)  Abnormal ECG  When compared with ECG of 28-DEC-2019 18:46,  No significant change was found    Assessment   Diagnosis Orders   1. Coronary artery disease involving native coronary artery of native heart without angina pectoris     2. Pure hypercholesterolemia     3. Orthostatic hypotension     4.  Postural dizziness with presyncope     5. S/P angioplasty with stent of the LM to LCX with 100% lad lesion and mod rca lesion- med RX 06/04/2018         Plan   The  current meds and labs reviewed    patient is advised to exercise 30 min s a day three times a week and about weight loss ,balance diet and     More fruits and vegetables . Advised aerobic exercise  tOTAL LAD GET COLLATERAL FROM RCA  MED RX  MINIMAL RESISTANCE EXERCISE   DO NOT RECOMMEND WORK WITH LIFTING HEAVY OBJECT LIKE 48 LB ALL THE TIME DURING THE WORK      Coronary artery disease, seems to be stable. Denies angina or change in breathing pattern  Cont asa and plavix  Cont statins and BB  Low salt diet  Active  No limitation    Pre op eval for neck surgery  May need lexisc nuc stress if surgery to be done    Hx Low Normal BP- normalized after hydration  Advised hydration     Hyperlipidemia: on statins, followed periodically. Patient need periodic lipid and liver profile. D/w the pat the plan of care    Lab prior to next visit    patient is advised to exercise 30 min s a day three times a week and about weight loss ,balance diet and     More fruits and vegetables . Doing well and stable    Discussed use, benefit, and side effects of prescribed medications. All patient questions answered. Pt voiced understanding. Instructed to continue current medications, diet and exercise. Continue risk factor modification and medical management. Patient agreed with treatment plan. Follow up as directed.       RTC in 6 months    Formerly [facility], [facility]

## 2021-01-21 ENCOUNTER — TELEPHONE (OUTPATIENT)
Dept: FAMILY MEDICINE CLINIC | Age: 66
End: 2021-01-21

## 2021-01-21 DIAGNOSIS — G43.009 MIGRAINE WITHOUT AURA AND WITHOUT STATUS MIGRAINOSUS, NOT INTRACTABLE: ICD-10-CM

## 2021-01-21 NOTE — TELEPHONE ENCOUNTER
Pt says he had stopped the Topamax he was given for headaches because he thought the headaches were from a pinched nerve in his neck. Pt started with headaches again, so pt is asking if he should start back on the med. He will need a refill. Please advise.

## 2021-01-21 NOTE — TELEPHONE ENCOUNTER
Pt called office left vm asking if he is to go back on a certain medication. Pt did not leave the name of the medication. I attempted to clarify with pt and had to leave a vm.

## 2021-01-22 RX ORDER — TOPIRAMATE 50 MG/1
TABLET, FILM COATED ORAL
Qty: 90 TABLET | Refills: 1 | Status: SHIPPED | OUTPATIENT
Start: 2021-01-22 | End: 2021-07-20 | Stop reason: ALTCHOICE

## 2021-01-22 NOTE — TELEPHONE ENCOUNTER
Pt was notified and voiced understanding. He is asking for a refill of this to [facility]. If no call back, he will check with the pharmacy around noon. Please advise.

## 2021-02-10 ENCOUNTER — TELEPHONE (OUTPATIENT)
Dept: CARDIOLOGY CLINIC | Age: 66
End: 2021-02-10

## 2021-02-10 RX ORDER — MEDICAL SUPPLY, MISCELLANEOUS
1 EACH MISCELLANEOUS DAILY
Qty: 1 EACH | Refills: 0 | Status: SHIPPED | OUTPATIENT
Start: 2021-02-10

## 2021-02-15 ENCOUNTER — TELEPHONE (OUTPATIENT)
Dept: CARDIOLOGY CLINIC | Age: 66
End: 2021-02-15

## 2021-02-15 ENCOUNTER — OFFICE VISIT (OUTPATIENT)
Dept: PHYSICAL MEDICINE AND REHAB | Age: 66
End: 2021-02-15
Payer: MEDICARE

## 2021-02-15 VITALS
TEMPERATURE: 97.9 F | HEART RATE: 70 BPM | BODY MASS INDEX: 29.18 KG/M2 | DIASTOLIC BLOOD PRESSURE: 88 MMHG | WEIGHT: 197 LBS | SYSTOLIC BLOOD PRESSURE: 148 MMHG | HEIGHT: 69 IN

## 2021-02-15 DIAGNOSIS — I25.10 CORONARY ARTERY DISEASE INVOLVING NATIVE CORONARY ARTERY OF NATIVE HEART WITHOUT ANGINA PECTORIS: Primary | ICD-10-CM

## 2021-02-15 DIAGNOSIS — Z95.820 S/P ANGIOPLASTY WITH STENT: ICD-10-CM

## 2021-02-15 DIAGNOSIS — G89.4 CHRONIC PAIN SYNDROME: ICD-10-CM

## 2021-02-15 DIAGNOSIS — M47.812 CERVICAL SPONDYLOSIS: ICD-10-CM

## 2021-02-15 DIAGNOSIS — M54.81 BILATERAL OCCIPITAL NEURALGIA: ICD-10-CM

## 2021-02-15 DIAGNOSIS — M48.02 FORAMINAL STENOSIS OF CERVICAL REGION: ICD-10-CM

## 2021-02-15 DIAGNOSIS — Z01.818 PRE-OP TESTING: ICD-10-CM

## 2021-02-15 DIAGNOSIS — M48.02 CERVICAL SPINAL STENOSIS: Primary | ICD-10-CM

## 2021-02-15 PROCEDURE — G8417 CALC BMI ABV UP PARAM F/U: HCPCS | Performed by: NURSE PRACTITIONER

## 2021-02-15 PROCEDURE — G8427 DOCREV CUR MEDS BY ELIG CLIN: HCPCS | Performed by: NURSE PRACTITIONER

## 2021-02-15 PROCEDURE — 99214 OFFICE O/P EST MOD 30 MIN: CPT | Performed by: NURSE PRACTITIONER

## 2021-02-15 PROCEDURE — G8484 FLU IMMUNIZE NO ADMIN: HCPCS | Performed by: NURSE PRACTITIONER

## 2021-02-15 ASSESSMENT — ENCOUNTER SYMPTOMS
EYE PAIN: 0
COUGH: 0
RHINORRHEA: 0
DIARRHEA: 0
SORE THROAT: 0
SINUS PRESSURE: 0
SHORTNESS OF BREATH: 0
ABDOMINAL PAIN: 0
VOMITING: 0
NAUSEA: 0
CONSTIPATION: 0
PHOTOPHOBIA: 0
CHEST TIGHTNESS: 0
COLOR CHANGE: 0
BACK PAIN: 0
WHEEZING: 0

## 2021-02-15 NOTE — TELEPHONE ENCOUNTER
Pre op Risk Assessment    Procedure REJI @ C6 #1  Physician [facility]  Date of surgery/procedure TBD    Last OV 12-15-20  Last Stress 3-16-18  Last Echo 8-10-20  Last Cath 6-4-18  Last Stent 6-4-18  Is patient on blood thinners Plavix/ASA  Hold Meds/how many days 5 days for both    Fax: [phone]

## 2021-02-15 NOTE — PROGRESS NOTES
[facility] [address]  Dept: [phone]  Dept Fax: [phone]: [phone]    Visit Date: 2/15/2021    Sangita Barrow is a 72 y.o. male who is referred for pain management evaluation and treatment per Dr. Farrah Ahn. CAGE and CAGE-AID Questions   1. In the last three months, have you felt you should cut down or stop drinking or using drugs? Yes []        No [x]     2. In the last three months, has anyone annoyed you or gotten on your nerves by telling you to cut down or stop drinking or using drugs? Yes []        No [x]     3. In the last three months, have you felt guilty or bad about how much you drink or use drugs? Yes []        No [x]     4. In the last three months, have you been waking up wanting to have an alcoholic drink or use drugs? Yes []        No [x]        Opioid Risk Tool:  Clinician Form       1. Family History of Substance Abuse: Female Male    Alcohol   []1   []3    Illegal drugs   []2   []3    Prescription drugs     []4   []4   2. Personal History of Substance Abuse:          Alcohol   []3   []3    Illegal drugs   []4   []4    Prescription drugs     []5   []5   3. Age (justyna box if between 12 and 39):     []1   []1   4. History of Preadolescent Sexual Abuse:     []3   []0   5. Psychological Disease:      Attention deficit disorder, obsessive-compulsive disorder, bipolar, schizophrenia   []2   []2      Depression     []1   []1    Scoring Totals  0     Total Score  Low Risk  Moderate Risk  High Risk   Risk Category   0 - 3   4 - 7   8 or Above      Patient states symptoms interfere with:  A.  General Activity:  yes   B. Mood: no    C. Walking Ability:   no   D. Normal Work (Includes both work outside the home and housework):   yes    E.  Relations with Other People:  no   F. Sleep:   no   G.  Enjoyment of Life:  yes     HPI: ChiefComplaint: Neck pain and Headaches    HPI  New pt here with c/o neck pain with headaches. He denies cervical surgery. He has seen Dr Pamela Harris in 2020 no surgery recommended. He denies any major falls trauma or MVC in past that could have inured his neck. He works as a . 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 He has had mild neck pain for past 5 years. He started noticing increased neck pain in 2020 when he started babysitting his grandson and has had increased neck pain spasms stiffness burning pain  that increases with reaching pushing pulling bending lifting sleeping driving using phone turning head side to side up and down. He has headaches maybe weekly. He has limited ROM  His neck pops cracks grinds with movement. His neck pain is intermittent aching sharp burning at times and increases with all the movement above. He has occipital neuralgia also. His neck pain is an aching burning sharp at times. His pain sometimes radiates into LUE and hands fingers have numbness and weakness at times but not often. He has seen Dr Pamela Harris and Dr David Chaudhry. He is on Topamax and states it helps. His headaches go into his  Left eye and make him nauseated. Treatments tried: ice, heat, NSAIDS, PT, rubs creams patches, Tylenol. He has had thyroid cancer. He denies long term steroid use. Cervical MRI   FINDINGS:   There is a dextrocurvature of the cervical spine, similar to prior exam which is nonspecific and may be positional. There is minimal, grade 1, anterolisthesis of C4 relative to C5 on the basis of degenerative change, stable compared to prior CT. There is    otherwise anatomic vertebral body height and alignment. There is mild hyperintense T1 and T2 signal at the opposing endplates of U6-1 with associated osteophytes as evidence for Modic 2 degenerative change. Marrow signal is otherwise within normal    limits. The craniocervical junction appears intact.  The cervical spinal cord is normal in caliber without focal area of abnormal T2 signal identified. Paraspinal soft tissues are unremarkable. At C2-3 there is a minimal disc bulge which contributes to mild spinal canal stenosis in association with posterior ligament flavum thickening and mild right and moderate left neural foraminal stenosis in association with facet hypertrophy. At C3-4 there is a shallow disc bulge which contribute to mild spinal canal stenosis in association with posterior ligament flavum thickening and moderate bilateral neural foraminal stenosis in association with uncovertebral joint degenerative change and    facet hypertrophy. At C4-5 there is a shallow disc bulge with rim osteophytes which causes mild spinal canal stenosis, moderate right and severe left neural foraminal stenosis in association with uncovertebral joint degenerative change and facet hypertrophy. At C5-6 there is a minimal disc bulge without significant spinal canal stenosis and mild bilateral neural foraminal stenosis in association with uncovertebral joint degenerative change. At C6-7 there is a shallow disc bulge which appears to mild spinal canal stenosis and moderate right and mild left neural foraminal stenosis in association with uncovertebral joint degenerative change. At C7-T1 there is no significant spinal canal stenosis. There is mild bilateral neural foraminal stenosis in association with facet hypertrophy.           Impression    Multilevel degenerative changes of the cervical spine most pronounced at C4-5 where a shallow disc bulge with rim osteophytes causes mild spinal canal stenosis and moderate right and severe left neural foraminal stenosis in association with    uncovertebral joint degenerative change and facet hypertrophy.         EMG BUE 2020    The patient is allergic to pepto-bismol [bismuth subsalicylate].     PastMedical History  Bogdan  has a past medical history of Arthritis, Chronic diastolic nitroGLYCERIN (NITROSTAT) 0.4 MG SL tablet, Take 1 tablet at onset of chest pain, if no relief in 5 min may repeat x 2 then call 911, Disp: 25 tablet, Rfl: 3    levothyroxine (SYNTHROID) 125 MCG tablet, , Disp: , Rfl:     Subjective:      Review of Systems   Constitutional: Positive for activity change. Negative for appetite change, chills, diaphoresis, fatigue, fever and unexpected weight change. HENT: Negative for congestion, ear pain, hearing loss, mouth sores, nosebleeds, rhinorrhea, sinus pressure and sore throat. Eyes: Negative for photophobia, pain and visual disturbance. Respiratory: Negative for cough, chest tightness, shortness of breath and wheezing. Cardiovascular: Negative for chest pain and palpitations. NSTEMI post thyroid removal  2018 [facility]   Gastrointestinal: Negative for abdominal pain, constipation, diarrhea, nausea and vomiting. Endocrine: Negative for cold intolerance, heat intolerance, polydipsia, polyphagia and polyuria. Thyroid removed 2018  [facility]      Genitourinary: Negative for decreased urine volume, difficulty urinating, frequency and hematuria. Follows urology for ED   Musculoskeletal: Positive for arthralgias, myalgias, neck pain and neck stiffness. Negative for back pain, gait problem and joint swelling. Skin: Negative for color change and rash. Allergic/Immunologic: Negative for food allergies and immunocompromised state. Neurological: Positive for headaches. Negative for dizziness, tremors, seizures, syncope, facial asymmetry, speech difficulty, weakness and numbness. Hematological: Does not bruise/bleed easily. Psychiatric/Behavioral: Negative for agitation, behavioral problems, confusion, decreased concentration, dysphoric mood, hallucinations, self-injury, sleep disturbance and suicidal ideas. The patient is not nervous/anxious and is not hyperactive.         Objective:     Vitals:    02/15/21 1058 02/15/21 1106   BP: (!) 144/88 (!) 148/88   Site: Left Upper Arm Right Upper Arm   Position: Sitting    Cuff Size: Large Adult    Pulse: 70    Temp: 97.9 °F (36.6 °C)    TempSrc: Temporal    Weight: 197 lb (89.4 kg)    Height: 5' 9\" (1.753 m)        Physical Exam  Vitals signs and nursing note reviewed. Constitutional:       General: He is not in acute distress. Appearance: He is well-developed. He is not diaphoretic. HENT:      Head: Normocephalic and atraumatic. Right Ear: External ear normal.      Left Ear: External ear normal.      Nose: Nose normal.      Mouth/Throat:      Pharynx: No oropharyngeal exudate. Eyes:      General: No scleral icterus. Right eye: No discharge. Left eye: No discharge. Conjunctiva/sclera: Conjunctivae normal.      Pupils: Pupils are equal, round, and reactive to light. Neck:      Musculoskeletal: Neck supple. Decreased range of motion. Pain with movement and muscular tenderness present. No edema, erythema or neck rigidity. Thyroid: No thyromegaly. Cardiovascular:      Rate and Rhythm: Normal rate and regular rhythm. Heart sounds: Normal heart sounds. No murmur. No friction rub. No gallop. Pulmonary:      Effort: Pulmonary effort is normal. No respiratory distress. Breath sounds: Normal breath sounds. No wheezing or rales. Chest:      Chest wall: No tenderness. Abdominal:      General: Bowel sounds are normal. There is no distension. Palpations: Abdomen is soft. Tenderness: There is no abdominal tenderness. There is no guarding or rebound. Musculoskeletal:         General: Tenderness present. Right shoulder: Normal.      Left shoulder: He exhibits tenderness. Right wrist: Normal.      Left wrist: He exhibits tenderness.       Right hip: Normal.      Left hip: Normal.      Right knee: Normal.      Left knee: Normal.      Right ankle: Normal.      Left ankle: Normal.      Cervical back: He exhibits decreased range of motion, tenderness, bony tenderness, pain and spasm. Lumbar back: He exhibits tenderness and bony tenderness. Back:       Left hand: He exhibits tenderness. Decreased sensation noted. Decreased strength noted. Comments: CTS on left in past    Skin:     General: Skin is warm. Coloration: Skin is not pale. Findings: No erythema or rash. Neurological:      Mental Status: He is alert and oriented to person, place, and time. He is not disoriented. Cranial Nerves: Cranial nerves are intact. No cranial nerve deficit. Sensory: No sensory deficit. Motor: Motor function is intact. No atrophy or abnormal muscle tone. Coordination: Coordination is intact. Coordination normal.      Gait: Gait is intact. Gait normal.      Deep Tendon Reflexes: Babinski sign absent on the right side. Reflex Scores:       Tricep reflexes are 2+ on the right side and 1+ on the left side. Bicep reflexes are 2+ on the right side and 1+ on the left side. Brachioradialis reflexes are 2+ on the right side and 1+ on the left side. Patellar reflexes are 2+ on the right side and 2+ on the left side. Achilles reflexes are 2+ on the right side and 2+ on the left side. Comments: Motor 4/5 RUE  5/5 LUE BLE   Psychiatric:         Attention and Perception: Attention and perception normal. He is attentive. Mood and Affect: Mood and affect normal. Mood is not anxious or depressed. Affect is not labile, blunt, angry or inappropriate. Speech: Speech normal. He is communicative. Speech is not rapid and pressured, delayed, slurred or tangential.         Behavior: Behavior normal. Behavior is not agitated, slowed, aggressive, withdrawn, hyperactive or combative. Thought Content: Thought content normal. Thought content is not paranoid or delusional. Thought content does not include homicidal or suicidal ideation. Thought content does not include homicidal or suicidal plan.          Cognition and Memory: Cognition and memory normal. Memory is not impaired. He does not exhibit impaired recent memory or impaired remote memory. Judgment: Judgment normal. Judgment is not impulsive or inappropriate. Assessment:     1. Cervical spinal stenosis    2. Cervical spondylosis    3. Chronic pain syndrome    4. Foraminal stenosis of cervical region    5. Bilateral occipital neuralgia            Plan:      · Patient read and signed orientation and opioid agreement. · OARRS reviewed. Current MED:0  · Patient was not offered naloxone for home. · Discussed long term side effects of medications, tolerance, dependency and addiction. · UDS preformed today. · Patient told can not receive any pain medications from any other source. · No evidence of abuse, diversion or aberrant behavior. · Prescription Needs: No prescription pain medications at this time   Medications and/or procedures to improve function and quality of life- patient understanding with this and that may not be pain free   Discussed possible weaning of medication dosing dependent on treatment/procedure results.  Discussed with patient about safe storage of medications at home   Testing: reviewed Cervical MRI, EMG BUE   Dr Adalid Chambers notes    Procedures: REJI C6 #1   Discussed with patient about risks with procedure including infection, reaction to medication, increased pain, or bleeding.  Medications:none not interested   If patient is on blood thinners will need approval to hold: PLAVIX ASPIRIN per Dr Mika Epps Discussed Cervical Facet MBB and TFCESI if need      Previous Treatments tried:  · PT: Yes,  any benefit? No  · NSAIDs: Yes,  any benefit? No UNABLE TO TAKE DUE TO THINNERS  · Chiropractic: No,  any benefit? No  · Muscle relaxants: No,  any benefit? No  · Narcotics: No,  any benefit? No  · Spine surgeon consult: Yes Dr Adalid Chambers 2020 no surgery recommended  · Any Implants: Yes stent  KR    Meds.  Prescribed:   No orders of the defined types were placed in this encounter. Return for REJI @C6#1, Follow up after procedure. Time spent with patient was 60 minutes more than 50% was spent  Counseling/coordinated the patient'scare.     Electronically signed by KATIE Rausch CNP on 2/15/2021 at 11:49 AM

## 2021-02-16 NOTE — TELEPHONE ENCOUNTER
From my note  \"Pre op eval for neck surgery  May need lexisc nuc stress if surgery to be done\"'    Last cath and pci 2018    Need lexisc nuc  If the test okay may proceed with mod risk

## 2021-03-30 ENCOUNTER — HOSPITAL ENCOUNTER (OUTPATIENT)
Dept: NON INVASIVE DIAGNOSTICS | Age: 66
Discharge: HOME | End: 2021-03-30
Payer: MEDICARE

## 2021-03-30 DIAGNOSIS — Z95.820 S/P ANGIOPLASTY WITH STENT: ICD-10-CM

## 2021-03-30 DIAGNOSIS — I25.10 CORONARY ARTERY DISEASE INVOLVING NATIVE CORONARY ARTERY OF NATIVE HEART WITHOUT ANGINA PECTORIS: ICD-10-CM

## 2021-03-30 DIAGNOSIS — Z01.818 PRE-OP TESTING: ICD-10-CM

## 2021-03-30 PROCEDURE — 3430000000 HC RX DIAGNOSTIC RADIOPHARMACEUTICAL: Performed by: INTERNAL MEDICINE

## 2021-03-30 PROCEDURE — 93017 CV STRESS TEST TRACING ONLY: CPT | Performed by: INTERNAL MEDICINE

## 2021-03-30 PROCEDURE — A9500 TC99M SESTAMIBI: HCPCS | Performed by: INTERNAL MEDICINE

## 2021-03-30 PROCEDURE — 6360000002 HC RX W HCPCS

## 2021-03-30 PROCEDURE — 78452 HT MUSCLE IMAGE SPECT MULT: CPT

## 2021-03-30 RX ADMIN — Medication 30 MILLICURIE: at 08:50

## 2021-03-30 RX ADMIN — Medication 8.6 MILLICURIE: at 07:55

## 2021-04-07 ENCOUNTER — TELEPHONE (OUTPATIENT)
Dept: PHYSICAL MEDICINE AND REHAB | Age: 66
End: 2021-04-07

## 2021-04-08 ENCOUNTER — TELEPHONE (OUTPATIENT)
Dept: PHYSICAL MEDICINE AND REHAB | Age: 66
End: 2021-04-08

## 2021-04-08 NOTE — TELEPHONE ENCOUNTER
Called patient to schedule procedure due to having cardiac clearance after stress test done from Dr. Virgie Hopper. Pt unsure if he wants to have procedure anymore stating it is not bothering him as much as before and he still has questions about procedure.   Pt scheduled for follow up with Christ Avila on 4-21-21 @ 3:40pm.

## 2021-04-19 ENCOUNTER — OFFICE VISIT (OUTPATIENT)
Dept: FAMILY MEDICINE CLINIC | Age: 66
End: 2021-04-19
Payer: MEDICARE

## 2021-04-19 ENCOUNTER — HOSPITAL ENCOUNTER (OUTPATIENT)
Dept: INTERVENTIONAL RADIOLOGY/VASCULAR | Age: 66
Discharge: HOME | End: 2021-04-19
Payer: MEDICARE

## 2021-04-19 VITALS
DIASTOLIC BLOOD PRESSURE: 80 MMHG | BODY MASS INDEX: 29.25 KG/M2 | HEART RATE: 80 BPM | RESPIRATION RATE: 16 BRPM | WEIGHT: 198.1 LBS | SYSTOLIC BLOOD PRESSURE: 146 MMHG

## 2021-04-19 DIAGNOSIS — M79.661 RIGHT CALF PAIN: Primary | ICD-10-CM

## 2021-04-19 DIAGNOSIS — M79.89 RIGHT LEG SWELLING: ICD-10-CM

## 2021-04-19 DIAGNOSIS — I50.32 CHRONIC DIASTOLIC CHF (CONGESTIVE HEART FAILURE) (HCC): ICD-10-CM

## 2021-04-19 DIAGNOSIS — M79.661 RIGHT CALF PAIN: ICD-10-CM

## 2021-04-19 PROBLEM — I20.9 ANGINA PECTORIS (HCC): Status: RESOLVED | Noted: 2020-12-15 | Resolved: 2021-04-19

## 2021-04-19 PROBLEM — C73 MALIGNANT NEOPLASM OF THYROID GLAND (HCC): Status: RESOLVED | Noted: 2019-03-15 | Resolved: 2021-04-19

## 2021-04-19 PROCEDURE — 1123F ACP DISCUSS/DSCN MKR DOCD: CPT | Performed by: NURSE PRACTITIONER

## 2021-04-19 PROCEDURE — 1036F TOBACCO NON-USER: CPT | Performed by: NURSE PRACTITIONER

## 2021-04-19 PROCEDURE — 99213 OFFICE O/P EST LOW 20 MIN: CPT | Performed by: NURSE PRACTITIONER

## 2021-04-19 PROCEDURE — G8427 DOCREV CUR MEDS BY ELIG CLIN: HCPCS | Performed by: NURSE PRACTITIONER

## 2021-04-19 PROCEDURE — 4040F PNEUMOC VAC/ADMIN/RCVD: CPT | Performed by: NURSE PRACTITIONER

## 2021-04-19 PROCEDURE — 3017F COLORECTAL CA SCREEN DOC REV: CPT | Performed by: NURSE PRACTITIONER

## 2021-04-19 PROCEDURE — G8417 CALC BMI ABV UP PARAM F/U: HCPCS | Performed by: NURSE PRACTITIONER

## 2021-04-19 PROCEDURE — 93971 EXTREMITY STUDY: CPT

## 2021-04-19 RX ORDER — NAPROXEN 500 MG/1
500 TABLET ORAL 2 TIMES DAILY WITH MEALS
Qty: 30 TABLET | Refills: 0 | Status: SHIPPED | OUTPATIENT
Start: 2021-04-19 | End: 2021-07-20 | Stop reason: ALTCHOICE

## 2021-04-19 ASSESSMENT — ENCOUNTER SYMPTOMS: RESPIRATORY NEGATIVE: 1

## 2021-04-19 ASSESSMENT — PATIENT HEALTH QUESTIONNAIRE - PHQ9
2. FEELING DOWN, DEPRESSED OR HOPELESS: 0
SUM OF ALL RESPONSES TO PHQ QUESTIONS 1-9: 0
1. LITTLE INTEREST OR PLEASURE IN DOING THINGS: 0
SUM OF ALL RESPONSES TO PHQ QUESTIONS 1-9: 0

## 2021-04-19 NOTE — PROGRESS NOTES
Pati Ruiz (1955) 77 y.o. male here for evaluation of the following chief complaint(s):      HPI:  Chief Complaint   Patient presents with    Leg Pain     right x 1 week    Leg Swelling     right       Onset of 1 week with leg pain. Started out in back of leg. Felt like a snap in back of knee and calf area. Improves with rest.  Worse with walking long period of time. Denies cramping pain. Ache. Denies knee pain. FROM knee. Denies knee instability. Walks a lot at work. No recent flight or travel. No recent surgery. Vitals:    04/19/21 0900   BP: (!) 146/80   Pulse:    Resp:        Lab Results   Component Value Date     08/10/2020    K 3.9 08/10/2020     08/10/2020    CO2 20 08/10/2020    BUN 15 08/10/2020    CREATININE 1.0 08/10/2020    GLUCOSE 111 08/10/2020    CALCIUM 8.0 08/10/2020        Patient Active Problem List   Diagnosis    Tinnitus    Hearing loss d/t noise    Essential hypertension    Impaired fasting blood sugar    Primary osteoarthritis of left knee    Liver disease    Near syncope    Mixed dyslipidemia    Postural dizziness with presyncope    Orthostatic hypotension    S/P angioplasty with stent of the LM to LCX with 100% lad lesion and mod rca lesion- med RX 06/04/2018    Coronary artery disease involving native coronary artery of native heart without angina pectoris    Non-ST elevation (NSTEMI) myocardial infarction (HCC)    Gait instability    Left-sided weakness    Otorrhagia of left ear    Hyperlipidemia    Chronic diastolic CHF (congestive heart failure) (HCC)    History of thyroid cancer    Chronic nonintractable headache       SUBJECTIVE/OBJECTIVE:  Review of Systems   Constitutional: Negative. Respiratory: Negative. Cardiovascular: Positive for leg swelling. Musculoskeletal: Positive for arthralgias and myalgias. Physical Exam  Constitutional:       General: He is not in acute distress.      Appearance: Normal appearance. He is not ill-appearing. Cardiovascular:      Rate and Rhythm: Normal rate and regular rhythm. Pulses: Normal pulses. Heart sounds: Normal heart sounds. Pulmonary:      Effort: Pulmonary effort is normal.      Breath sounds: Normal breath sounds. Musculoskeletal:      Right knee: He exhibits normal range of motion, no swelling and no bony tenderness. No tenderness found. No medial joint line and no lateral joint line tenderness noted. Left upper leg: He exhibits tenderness. He exhibits no bony tenderness and no edema. Left lower leg: He exhibits tenderness. He exhibits no bony tenderness. Edema present. Legs:    Neurological:      Mental Status: He is alert. ASSESSMENT/PLAN:   Diagnosis Orders   1. Right calf pain  VL DUP LOWER EXTREMITY VENOUS RIGHT    naproxen (NAPROSYN) 500 MG tablet   2. Right leg swelling  VL DUP LOWER EXTREMITY VENOUS RIGHT   3. Chronic diastolic CHF (congestive heart failure) (HCC)           MDM: Doppler US - NEG DVT   MSK pain r/t previous inactivity and sudden increase in activity   Naproxen BID   Heating pad   RTO prn       An electronic signature was used to authenticate this note.     --Jennifer Forrester, KATIE - CNP

## 2021-04-21 ENCOUNTER — OFFICE VISIT (OUTPATIENT)
Dept: PHYSICAL MEDICINE AND REHAB | Age: 66
End: 2021-04-21
Payer: MEDICARE

## 2021-04-21 VITALS
HEART RATE: 70 BPM | SYSTOLIC BLOOD PRESSURE: 122 MMHG | WEIGHT: 198 LBS | DIASTOLIC BLOOD PRESSURE: 64 MMHG | BODY MASS INDEX: 29.33 KG/M2 | HEIGHT: 69 IN

## 2021-04-21 DIAGNOSIS — M48.02 CERVICAL SPINAL STENOSIS: Primary | ICD-10-CM

## 2021-04-21 DIAGNOSIS — G89.4 CHRONIC PAIN SYNDROME: ICD-10-CM

## 2021-04-21 DIAGNOSIS — M48.02 FORAMINAL STENOSIS OF CERVICAL REGION: ICD-10-CM

## 2021-04-21 DIAGNOSIS — M47.812 CERVICAL SPONDYLOSIS: ICD-10-CM

## 2021-04-21 DIAGNOSIS — M54.81 BILATERAL OCCIPITAL NEURALGIA: ICD-10-CM

## 2021-04-21 PROCEDURE — 1036F TOBACCO NON-USER: CPT | Performed by: NURSE PRACTITIONER

## 2021-04-21 PROCEDURE — 99213 OFFICE O/P EST LOW 20 MIN: CPT | Performed by: NURSE PRACTITIONER

## 2021-04-21 PROCEDURE — 1123F ACP DISCUSS/DSCN MKR DOCD: CPT | Performed by: NURSE PRACTITIONER

## 2021-04-21 PROCEDURE — G8417 CALC BMI ABV UP PARAM F/U: HCPCS | Performed by: NURSE PRACTITIONER

## 2021-04-21 PROCEDURE — 3017F COLORECTAL CA SCREEN DOC REV: CPT | Performed by: NURSE PRACTITIONER

## 2021-04-21 PROCEDURE — 4040F PNEUMOC VAC/ADMIN/RCVD: CPT | Performed by: NURSE PRACTITIONER

## 2021-04-21 PROCEDURE — G8427 DOCREV CUR MEDS BY ELIG CLIN: HCPCS | Performed by: NURSE PRACTITIONER

## 2021-04-21 ASSESSMENT — ENCOUNTER SYMPTOMS
PHOTOPHOBIA: 0
CONSTIPATION: 0
NAUSEA: 0
DIARRHEA: 0
SORE THROAT: 0
SHORTNESS OF BREATH: 0
COLOR CHANGE: 0
BACK PAIN: 0
CHEST TIGHTNESS: 0
WHEEZING: 0
ABDOMINAL PAIN: 0
SINUS PRESSURE: 0
COUGH: 0
RHINORRHEA: 0
VOMITING: 0
EYE PAIN: 0

## 2021-04-21 NOTE — PROGRESS NOTES
weight change. HENT: Negative for congestion, ear pain, hearing loss, mouth sores, nosebleeds, rhinorrhea, sinus pressure and sore throat. Eyes: Negative for photophobia, pain and visual disturbance. Respiratory: Negative for cough, chest tightness, shortness of breath and wheezing. Cardiovascular: Negative for chest pain and palpitations. NSTEMI post thyroid removal  2018 at [facility]   Gastrointestinal: Negative for abdominal pain, constipation, diarrhea, nausea and vomiting. Endocrine: Negative for cold intolerance, heat intolerance, polydipsia, polyphagia and polyuria. Thyroid removed 2018  [facility]      Genitourinary: Negative for decreased urine volume, difficulty urinating, frequency and hematuria. Follows urology for ED   Musculoskeletal: Positive for arthralgias, myalgias, neck pain and neck stiffness. Negative for back pain, gait problem and joint swelling. Skin: Negative for color change and rash. Allergic/Immunologic: Negative for food allergies and immunocompromised state. Neurological: Positive for headaches. Negative for dizziness, tremors, seizures, syncope, facial asymmetry, speech difficulty, weakness and numbness. Hematological: Does not bruise/bleed easily. Psychiatric/Behavioral: Negative for agitation, behavioral problems, confusion, decreased concentration, dysphoric mood, hallucinations, self-injury, sleep disturbance and suicidal ideas. The patient is not nervous/anxious and is not hyperactive. Objective:     Vitals:    04/21/21 1544   BP: 122/64   Site: Left Upper Arm   Position: Sitting   Cuff Size: Medium Adult   Pulse: 70   Weight: 198 lb (89.8 kg)   Height: 5' 9\" (1.753 m)       Physical Exam  Vitals signs and nursing note reviewed. Constitutional:       General: He is not in acute distress. Appearance: He is well-developed. He is not diaphoretic. HENT:      Head: Normocephalic and atraumatic.       Right Ear: External ear normal.      Left Ear: External ear normal.      Nose: Nose normal.      Mouth/Throat:      Pharynx: No oropharyngeal exudate. Eyes:      General: No scleral icterus. Right eye: No discharge. Left eye: No discharge. Conjunctiva/sclera: Conjunctivae normal.      Pupils: Pupils are equal, round, and reactive to light. Neck:      Musculoskeletal: Neck supple. Decreased range of motion. Pain with movement and muscular tenderness present. No edema, erythema or neck rigidity. Thyroid: No thyromegaly. Cardiovascular:      Rate and Rhythm: Normal rate and regular rhythm. Heart sounds: Normal heart sounds. No murmur. No friction rub. No gallop. Pulmonary:      Effort: Pulmonary effort is normal. No respiratory distress. Breath sounds: Normal breath sounds. No wheezing or rales. Chest:      Chest wall: No tenderness. Abdominal:      General: Bowel sounds are normal. There is no distension. Palpations: Abdomen is soft. Tenderness: There is no abdominal tenderness. There is no guarding or rebound. Musculoskeletal:         General: Tenderness present. Right shoulder: Normal.      Left shoulder: He exhibits tenderness. Right wrist: Normal.      Left wrist: He exhibits tenderness. Right hip: Normal.      Left hip: Normal.      Right knee: Normal.      Left knee: Normal.      Right ankle: Normal.      Left ankle: Normal.      Cervical back: He exhibits decreased range of motion, tenderness, bony tenderness, pain and spasm. Lumbar back: He exhibits tenderness and bony tenderness. Back:       Left hand: He exhibits tenderness. Decreased sensation noted. Decreased strength noted. Comments: CTS on left in past    Skin:     General: Skin is warm. Coloration: Skin is not pale. Findings: No erythema or rash. Neurological:      Mental Status: He is alert and oriented to person, place, and time. He is not disoriented.       Cranial Nerves: Cranial nerves are intact. No cranial nerve deficit. Sensory: No sensory deficit. Motor: Motor function is intact. No atrophy or abnormal muscle tone. Coordination: Coordination is intact. Coordination normal.      Gait: Gait is intact. Gait normal.      Deep Tendon Reflexes: Babinski sign absent on the right side. Reflex Scores:       Tricep reflexes are 2+ on the right side and 1+ on the left side. Bicep reflexes are 2+ on the right side and 1+ on the left side. Brachioradialis reflexes are 2+ on the right side and 1+ on the left side. Patellar reflexes are 2+ on the right side and 2+ on the left side. Achilles reflexes are 2+ on the right side and 2+ on the left side. Comments: Motor 4/5 RUE  5/5 LUE BLE   Psychiatric:         Attention and Perception: Attention and perception normal. He is attentive. Mood and Affect: Mood and affect normal. Mood is not anxious or depressed. Affect is not labile, blunt, angry or inappropriate. Speech: Speech normal. He is communicative. Speech is not rapid and pressured, delayed, slurred or tangential.         Behavior: Behavior normal. Behavior is not agitated, slowed, aggressive, withdrawn, hyperactive or combative. Thought Content: Thought content normal. Thought content is not paranoid or delusional. Thought content does not include homicidal or suicidal ideation. Thought content does not include homicidal or suicidal plan. Cognition and Memory: Cognition and memory normal. Memory is not impaired. He does not exhibit impaired recent memory or impaired remote memory. Judgment: Judgment normal. Judgment is not impulsive or inappropriate. Assessment:     1. Cervical spinal stenosis    2. Cervical spondylosis    3. Foraminal stenosis of cervical region    4. Bilateral occipital neuralgia    5. Chronic pain syndrome            Plan:      · OARRS reviewed.  Current MED:  · Patient was not offered naloxone for home. · Discussed long term side effects of medications, tolerance, dependency and addiction. · Previous UDS reviewed  · UDS preformed today for compliance. · Patient told can not receive any pain medications from any other source. · No evidence of abuse, diversion or aberrant behavior.  Medications and/or procedures to improve function and quality of life- patient understanding with this and that may not be pain free   Discussed with patient about safe storage of medications at home   Discussed possible weaning of medication dosing dependent on treatment/procedure results.  Testing: none   Procedures: refuses states neck pain is  gone   Discussed with patient about risks with procedure including infection, reaction to medication, increased pain, or bleeding.  Medications: none   If patient is on blood thinners will need approval to hold:N/A      Meds. Prescribed:   No orders of the defined types were placed in this encounter. Return for pt will call .                Electronically signed by KATIE Camacho CNP on4/21/2021 at 4:22 PM

## 2021-06-28 RX ORDER — METOPROLOL SUCCINATE 25 MG/1
25 TABLET, EXTENDED RELEASE ORAL DAILY
Qty: 30 TABLET | Refills: 11 | Status: SHIPPED | OUTPATIENT
Start: 2021-06-28 | End: 2021-09-27

## 2021-06-28 RX ORDER — ATORVASTATIN CALCIUM 40 MG/1
40 TABLET, FILM COATED ORAL DAILY
Qty: 30 TABLET | Refills: 11 | Status: SHIPPED | OUTPATIENT
Start: 2021-06-28 | End: 2021-09-27

## 2021-06-28 RX ORDER — CLOPIDOGREL BISULFATE 75 MG/1
75 TABLET ORAL DAILY
Qty: 30 TABLET | Refills: 11 | Status: SHIPPED | OUTPATIENT
Start: 2021-06-28 | End: 2022-06-27

## 2021-07-06 RX ORDER — ASPIRIN 81 MG/1
TABLET, CHEWABLE ORAL
Qty: 30 TABLET | Refills: 11 | Status: SHIPPED | OUTPATIENT
Start: 2021-07-06 | End: 2022-07-07

## 2021-07-20 ENCOUNTER — OFFICE VISIT (OUTPATIENT)
Dept: CARDIOLOGY CLINIC | Age: 66
End: 2021-07-20
Payer: MEDICARE

## 2021-07-20 VITALS
WEIGHT: 193.2 LBS | DIASTOLIC BLOOD PRESSURE: 86 MMHG | BODY MASS INDEX: 28.61 KG/M2 | SYSTOLIC BLOOD PRESSURE: 140 MMHG | HEIGHT: 69 IN | HEART RATE: 64 BPM

## 2021-07-20 DIAGNOSIS — Z95.820 S/P ANGIOPLASTY WITH STENT: ICD-10-CM

## 2021-07-20 DIAGNOSIS — E78.00 PURE HYPERCHOLESTEROLEMIA: ICD-10-CM

## 2021-07-20 DIAGNOSIS — I25.10 CORONARY ARTERY DISEASE INVOLVING NATIVE CORONARY ARTERY OF NATIVE HEART WITHOUT ANGINA PECTORIS: Primary | ICD-10-CM

## 2021-07-20 DIAGNOSIS — I10 ESSENTIAL HYPERTENSION: ICD-10-CM

## 2021-07-20 PROBLEM — E78.2 MIXED DYSLIPIDEMIA: Status: RESOLVED | Noted: 2018-03-16 | Resolved: 2021-07-20

## 2021-07-20 PROCEDURE — 93000 ELECTROCARDIOGRAM COMPLETE: CPT | Performed by: INTERNAL MEDICINE

## 2021-07-20 PROCEDURE — 99214 OFFICE O/P EST MOD 30 MIN: CPT | Performed by: INTERNAL MEDICINE

## 2021-07-20 NOTE — PROGRESS NOTES
Chief Complaint   Patient presents with    6 Month Follow-Up    Coronary Artery Disease     Originally  patient here - ref by Dr. Gio Zuniga - NSTEMI  referred from [facility] post Thyriod surgery  He sufferred NSTEMI post op in [facility]      6 month follow up. EKG done today.     No dizziness    Denied cp, sob, dizziness, swelling or palpitations    Reviewed the record from [facility]          Patient Active Problem List   Diagnosis    Tinnitus    Hearing loss d/t noise    Essential hypertension    Impaired fasting blood sugar    Primary osteoarthritis of left knee    Liver disease    Near syncope    Postural dizziness with presyncope    Orthostatic hypotension    S/P angioplasty with stent of the LM to LCX with 100% lad lesion and mod rca lesion- med RX 06/04/2018    Coronary artery disease involving native coronary artery of native heart without angina pectoris    Non-ST elevation (NSTEMI) myocardial infarction (HCC)    Gait instability    Left-sided weakness    Otorrhagia of left ear    Hyperlipidemia    Chronic diastolic CHF (congestive heart failure) (HCC)    History of thyroid cancer    Chronic nonintractable headache       Past Surgical History:   Procedure Laterality Date    CARPAL TUNNEL RELEASE      CHOLECYSTECTOMY      HERNIA REPAIR      THYROIDECTOMY      TONSILLECTOMY AND ADENOIDECTOMY      TOTAL KNEE ARTHROPLASTY Left        Allergies   Allergen Reactions    Pepto-Bismol [Bismuth Subsalicylate] Nausea And Vomiting        Family History   Problem Relation Age of Onset    Heart Disease Mother     High Blood Pressure Mother     High Cholesterol Mother     Arthritis Mother     Miscarriages / Stillbirths Mother     Heart Disease Father     Arthritis Father     Cancer Brother         melanoma        Social History     Socioeconomic History    Marital status:      Spouse name: Jett Resendez Number of children: 4    Years of education: 15    Highest education level: Some college, no degree   Occupational History    Not on file   Tobacco Use    Smoking status: Never Smoker    Smokeless tobacco: Never Used   Vaping Use    Vaping Use: Never used   Substance and Sexual Activity    Alcohol use: No    Drug use: No    Sexual activity: Yes     Partners: Female   Other Topics Concern    Not on file   Social History Narrative    Not on file     Social Determinants of Health     Financial Resource Strain:     Difficulty of Paying Living Expenses:    Food Insecurity:     Worried About Running Out of Food in the Last Year:     [address] in the Last Year:    Transportation Needs:     Lack of Transportation (Medical):      Lack of Transportation (Non-Medical):    Physical Activity:     Days of Exercise per Week:     Minutes of Exercise per Session:    Stress:     Feeling of Stress :    Social Connections:     Frequency of Communication with Friends and Family:     Frequency of Social Gatherings with Friends and Family:     Attends [employer] Services:     Active Member of Clubs or Organizations:     Attends Club or Organization Meetings:     Marital Status:    Intimate Partner Violence:     Fear of Current or Ex-Partner:     Emotionally Abused:     Physically Abused:     Sexually Abused:        Current Outpatient Medications   Medication Sig Dispense Refill    aspirin 81 MG chewable tablet CHEW AND SWALLOW 1 TABLET BY MOUTH DAILY 30 tablet 11    metoprolol succinate (TOPROL XL) 25 MG extended release tablet TAKE 1 TABLET BY MOUTH DAILY 30 tablet 11    clopidogrel (PLAVIX) 75 MG tablet TAKE 1 TABLET BY MOUTH DAILY 30 tablet 11    atorvastatin (LIPITOR) 40 MG tablet TAKE 1 TABLET BY MOUTH DAILY 30 tablet 11    Blood Pressure Monitoring (B-D ASSURE BPM/DELUXE ARM CUFF) MISC 1 kit by Does not apply route daily 1 each 0    sildenafil (VIAGRA) 100 MG tablet Take 1 tablet by mouth daily as needed for Erectile Dysfunction 30 tablet 1    Multiple Vitamins-Minerals (MENS 50+ MULTI VITAMIN/MIN) TABS Take by mouth daily      acetaminophen (TYLENOL) 500 MG tablet Take 1 tablet by mouth 4 times daily as needed for Pain (for neck pain and headaches) 360 tablet 1    levothyroxine (SYNTHROID) 125 MCG tablet        No current facility-administered medications for this visit. Review of Systems -     General ROS: negative  Psychological ROS: negative  Hematological and Lymphatic ROS: No history of blood clots or bleeding disorder. Respiratory ROS: no cough, shortness of breath, or wheezing  Cardiovascular ROS: no chest pain or dyspnea on exertion  Gastrointestinal ROS: negative  Genito-Urinary ROS: no dysuria, trouble voiding, or hematuria  Musculoskeletal ROS: negative  Neurological ROS: no TIA or stroke symptoms  Dermatological ROS: negative      Blood pressure (!) 140/86, pulse 64, height 5' 9\" (1.753 m), weight 193 lb 3.2 oz (87.6 kg). Physical Examination:    General appearance - alert, well appearing, and in no distress  Mental status - alert, oriented to person, place, and time  Neck - supple, no significant adenopathy, no JVD, or carotid bruits  Chest - clear to auscultation, no wheezes, rales or rhonchi, symmetric air entry  Heart - normal rate, regular rhythm, normal S1, S2, no murmurs, rubs, clicks or gallops  Abdomen - soft, nontender, nondistended, no masses or organomegaly  Neurological - alert, oriented, normal speech, no focal findings or movement disorder noted  Musculoskeletal - no joint tenderness, deformity or swelling  Extremities - peripheral pulses normal, no pedal edema, no clubbing or cyanosis  Skin - normal coloration and turgor, no rashes, no suspicious skin lesions noted    Lab  No results for input(s): CKTOTAL, CKMB, CKMBINDEX, TROPONINI in the last 72 hours.   CBC:   Lab Results   Component Value Date    WBC 6.1 08/10/2020    RBC 5.06 08/10/2020    HGB 14.1 08/10/2020    HCT 43.9 08/10/2020    MCV 86.8 08/10/2020    MCH 27.9 08/10/2020    MCHC 32.1 08/10/2020    RDW 15.6 03/17/2018     08/10/2020    MPV 10.0 08/10/2020     BMP:    Lab Results   Component Value Date     08/10/2020    K 3.9 08/10/2020     08/10/2020    CO2 20 08/10/2020    BUN 15 08/10/2020    LABALBU 4.2 05/29/2020    CREATININE 1.0 08/10/2020    CALCIUM 8.0 08/10/2020    LABGLOM 75 08/10/2020    GLUCOSE 111 08/10/2020     Hepatic Function Panel:    Lab Results   Component Value Date    ALKPHOS 102 05/29/2020    ALT 21 05/29/2020    AST 16 05/29/2020    PROT 6.8 05/29/2020    BILITOT 0.6 05/29/2020    BILIDIR <0.2 05/29/2020    LABALBU 4.2 05/29/2020     Magnesium:    Lab Results   Component Value Date    MG 2.2 08/10/2020     Warfarin PT/INR:  No components found for: PTPATWAR, PTINRWAR  HgBA1c:    Lab Results   Component Value Date    LABA1C 6.2 08/10/2020     FLP:    Lab Results   Component Value Date    TRIG 138 08/10/2020    HDL 46 08/10/2020    LDLCALC 56 08/10/2020     TSH:    Lab Results   Component Value Date    TSH 2.400 04/27/2021 06/04/2018 post op cath at [facility] summary:  · There is 100% chronic total occlusion for the LAD with R to L   collaterals  · There is 100% acute thrombotic occlusion of the left main to LCx   stenosis that represents culprit lesion  · Moderate non obstructive disease in RCA with R to L collaterals  · LVEDP mildly elevated at 15 mmHg; No LV-AO gradient on pull back    Intervention summary:  · Complex PCI of left main-LCx using OTW. Femoral access was obtained at   beginning of procedure in anticipation of possible hemodynamic support  · The left main-LCx lesion was pre-dilated (BALL MEDTRONIC [id number]) and   treated with BMSx1 ( BMS INTEGRITY 3.50 X 18). After deployment there was   no residual stenosis, no evidence of dissection and ASHA III flow   distally. Stent was then post dilated (BALL 3.5 X 12 RX NC EUPHO)    Recommendations  · Aspirin indefinitely.  Plavix for at least 1 month given, preferably   longer given NSTEMI  · Transfer to Heart 3 team for further care    Echo 06/04/2018    Indication:  S/P NSTEMI, preop CABG        Findings   L Ventricle: The left ventricle is enlarged. There is no left ventricular  hypertrophy. The left ventricular septal wall appears aneurysmal.  There  is diffuse global hypokinesis. The estimated ejection fraction is  55-60%, consistent with normal systolic function. The left ventricular  biplane EF is 55%. EF by 3D echo was 55%. There is an E to A reversal in  the mitral valve flow pattern suggestive of diastolic dysfunction. The   basal anterolateral, and  mid anterolateral wall segments are  hypokinetic (score 2). Overall wallmotion score index is  1.13 The  global longitudinal strain was -16.8%. EKG 7/10/18  Sinus  Rhythm   WITHIN NORMAL LIMITS    ekg  9/30/19  NSR, No acute abn  TW abn    Conclusions      Summary   Normal left ventricle size and systolic function. Ejection fraction was   estimated at 55 %. There were no regional left ventricular wall motion   abnormalities and wall thickness was within normal limits. Doppler parameters were consistent with abnormal left ventricular   relaxation (grade 1 diastolic dysfunction). The left atrium is Mildly dilated. Signature      ----------------------------------------------------------------   Electronically signed by Batsheva Pagan MD (Interpreting   physician) on 08/10/2020 at 05:45 PM   ----------------------------------------------------------------      ekg  08/2020  Normal sinus rhythm  Minimal voltage criteria for LVH, may be normal variant  Septal infarct (cited on or before 30-SEP-2018)  Abnormal ECG  When compared with ECG of 28-DEC-2019 18:46,  No significant change was found    ekg 7/20/21  Sinus  Rhythm   WITHIN NORMAL LIMITS      Assessment   Diagnosis Orders   1.  Coronary artery disease involving native coronary artery of native heart without angina pectoris  EKG 12 lead    Lipid Panel    Hepatic Function Panel 2. Essential hypertension  Lipid Panel    Hepatic Function Panel   3. Pure hypercholesterolemia  Lipid Panel    Hepatic Function Panel   4. S/P angioplasty with stent of the LM to LCX with 100% lad lesion and mod rca lesion- med RX 06/04/2018  Lipid Panel    Hepatic Function Panel       Plan   The  current meds and labs reviewed    patient is advised to exercise 30 min s a day three times a week and about weight loss ,balance diet and     More fruits and vegetables . Advised aerobic exercise  tOTAL LAD GET COLLATERAL FROM RCA  MED RX  MINIMAL RESISTANCE EXERCISE   DO NOT RECOMMEND WORK WITH LIFTING HEAVY OBJECT LIKE 48 LB ALL THE TIME DURING THE WORK      Coronary artery disease, seems to be stable. Denies angina or change in breathing pattern  Cont asa and plavix  Cont statins and BB  Low salt diet  Active  No limitation    Hx Low Normal BP- normalized after hydration  Advised hydration     Hyperlipidemia: on statins, followed periodically. Patient need periodic lipid and liver profile. D/w the pat the plan of care    Lab prior to next visit    patient is advised to exercise 30 min s a day three times a week and about weight loss ,balance diet and     More fruits and vegetables . Pemaf[place] Ballesteros is Doing well and stable    Discussed use, benefit, and side effects of prescribed medications. All patient questions answered. Pt voiced understanding. Instructed to continue current medications, diet and exercise. Continue risk factor modification and medical management. Patient agreed with treatment plan. Follow up as directed.       RTC in 6 months    Lauren Bergeron, [facility]

## 2021-09-27 RX ORDER — ATORVASTATIN CALCIUM 40 MG/1
40 TABLET, FILM COATED ORAL DAILY
Qty: 30 TABLET | Refills: 11 | Status: SHIPPED | OUTPATIENT
Start: 2021-09-27 | End: 2022-10-26

## 2021-09-27 RX ORDER — METOPROLOL SUCCINATE 25 MG/1
25 TABLET, EXTENDED RELEASE ORAL DAILY
Qty: 30 TABLET | Refills: 11 | Status: SHIPPED | OUTPATIENT
Start: 2021-09-27 | End: 2022-02-24

## 2021-09-27 RX ORDER — ATORVASTATIN CALCIUM 40 MG/1
40 TABLET, FILM COATED ORAL DAILY
Qty: 30 TABLET | Refills: 11 | Status: SHIPPED | OUTPATIENT
Start: 2021-09-27 | End: 2022-02-24

## 2022-01-19 ENCOUNTER — TELEPHONE (OUTPATIENT)
Dept: FAMILY MEDICINE CLINIC | Age: 67
End: 2022-01-19

## 2022-01-19 NOTE — TELEPHONE ENCOUNTER
Pt requesting Covid Vaccine Exemption letter due to medical reasons. Pt stated the cardiologist office recommends he get it, but pt does not want the vaccine.

## 2022-01-19 NOTE — TELEPHONE ENCOUNTER
There is no medical exemption I can write unless he has an allergy to the vaccine. No medical condition he has precludes him from the vaccine.

## 2022-02-08 ENCOUNTER — OFFICE VISIT (OUTPATIENT)
Dept: FAMILY MEDICINE CLINIC | Age: 67
End: 2022-02-08
Payer: MEDICARE

## 2022-02-08 VITALS
DIASTOLIC BLOOD PRESSURE: 88 MMHG | HEART RATE: 72 BPM | SYSTOLIC BLOOD PRESSURE: 146 MMHG | WEIGHT: 191.9 LBS | RESPIRATION RATE: 16 BRPM | BODY MASS INDEX: 28.34 KG/M2

## 2022-02-08 DIAGNOSIS — Z12.5 SCREENING PSA (PROSTATE SPECIFIC ANTIGEN): ICD-10-CM

## 2022-02-08 DIAGNOSIS — J01.00 SUBACUTE MAXILLARY SINUSITIS: Primary | ICD-10-CM

## 2022-02-08 DIAGNOSIS — R73.01 IMPAIRED FASTING BLOOD SUGAR: ICD-10-CM

## 2022-02-08 DIAGNOSIS — I50.32 CHRONIC DIASTOLIC CHF (CONGESTIVE HEART FAILURE) (HCC): ICD-10-CM

## 2022-02-08 DIAGNOSIS — I25.10 CORONARY ARTERY DISEASE INVOLVING NATIVE CORONARY ARTERY OF NATIVE HEART WITHOUT ANGINA PECTORIS: ICD-10-CM

## 2022-02-08 DIAGNOSIS — Z85.850 HISTORY OF THYROID CANCER: ICD-10-CM

## 2022-02-08 DIAGNOSIS — I10 ESSENTIAL HYPERTENSION: ICD-10-CM

## 2022-02-08 DIAGNOSIS — Z95.820 S/P ANGIOPLASTY WITH STENT: ICD-10-CM

## 2022-02-08 DIAGNOSIS — E03.9 ACQUIRED HYPOTHYROIDISM: ICD-10-CM

## 2022-02-08 PROCEDURE — 3288F FALL RISK ASSESSMENT DOCD: CPT | Performed by: NURSE PRACTITIONER

## 2022-02-08 PROCEDURE — 99214 OFFICE O/P EST MOD 30 MIN: CPT | Performed by: NURSE PRACTITIONER

## 2022-02-08 RX ORDER — AMOXICILLIN AND CLAVULANATE POTASSIUM 875; 125 MG/1; MG/1
1 TABLET, FILM COATED ORAL 2 TIMES DAILY WITH MEALS
Qty: 20 TABLET | Refills: 0 | Status: SHIPPED | OUTPATIENT
Start: 2022-02-08 | End: 2022-02-18

## 2022-02-08 SDOH — ECONOMIC STABILITY: FOOD INSECURITY: WITHIN THE PAST 12 MONTHS, YOU WORRIED THAT YOUR FOOD WOULD RUN OUT BEFORE YOU GOT MONEY TO BUY MORE.: NEVER TRUE

## 2022-02-08 SDOH — ECONOMIC STABILITY: FOOD INSECURITY: WITHIN THE PAST 12 MONTHS, THE FOOD YOU BOUGHT JUST DIDN'T LAST AND YOU DIDN'T HAVE MONEY TO GET MORE.: NEVER TRUE

## 2022-02-08 ASSESSMENT — ENCOUNTER SYMPTOMS
SINUS PRESSURE: 1
ABDOMINAL PAIN: 0
COUGH: 0
SINUS PAIN: 1
SHORTNESS OF BREATH: 0
RHINORRHEA: 1
NAUSEA: 0

## 2022-02-08 ASSESSMENT — SOCIAL DETERMINANTS OF HEALTH (SDOH): HOW HARD IS IT FOR YOU TO PAY FOR THE VERY BASICS LIKE FOOD, HOUSING, MEDICAL CARE, AND HEATING?: NOT HARD AT ALL

## 2022-02-08 NOTE — PROGRESS NOTES
Bry Finley (1955) 77 y.o. male here for evaluation of the following chief complaint(s):      HPI:  Chief Complaint   Patient presents with    Sinusitis     no OTC meds taken       Onset of 3 weeks with sinus symptoms. Sinus Pressure and HA behind the eyes. Runny nose. PND worse when he lays down. Watery eyes. No cough or chest congestion. OTC:  Sinus Medication    Vitals:    02/08/22 1312   BP: (!) 146/88   Pulse: 72   Resp: 16       Patient Active Problem List   Diagnosis    Tinnitus    Hearing loss d/t noise    Essential hypertension    Impaired fasting blood sugar    Primary osteoarthritis of left knee    Liver disease    Near syncope    Postural dizziness with presyncope    Orthostatic hypotension    S/P angioplasty with stent of the LM to LCX with 100% lad lesion and mod rca lesion- med RX 06/04/2018    Coronary artery disease involving native coronary artery of native heart without angina pectoris    Non-ST elevation (NSTEMI) myocardial infarction (HCC)    Gait instability    Left-sided weakness    Otorrhagia of left ear    Hyperlipidemia    Chronic diastolic CHF (congestive heart failure) (HCC)    History of thyroid cancer    Chronic nonintractable headache       COLONOSCOPY - due    CARDIO - Dr. Sumit Busch -  Dr. Aylin Sorto with ENDO in [facility] hx of Thyroid Cancer s/p thyroidectomy. On Synthroid 125 mcg. CAD with STENT. Follows with CARDIO. Denies CP, SOB or chest tightness. On STATIN, PLAVIX and BB    BP slight elevation. On BB. BP Readings from Last 3 Encounters:   02/08/22 (!) 146/88   07/20/21 (!) 140/86   04/21/21 122/64       Due for annual labs.      Lab Results   Component Value Date    LABA1C 6.2 08/10/2020    LABA1C 5.7 03/16/2018    LABA1C 5.9 01/04/2017     No results found for: EAG    No components found for: CHLPL  Lab Results   Component Value Date    TRIG 138 08/10/2020    TRIG 106 05/29/2020    TRIG 116 09/30/2019     Lab Results Component Value Date    HDL 46 08/10/2020    HDL 57 05/29/2020    HDL 52 09/30/2019     Lab Results   Component Value Date    LDLCALC 56 08/10/2020    LDLCALC 55 05/29/2020    LDLCALC 54 09/30/2019     No results found for: LABVLDL      Chemistry        Component Value Date/Time     08/10/2020 0351    K 3.9 08/10/2020 0351     (H) 08/10/2020 0351    CO2 20 (L) 08/10/2020 0351    BUN 15 08/10/2020 0351    CREATININE 1.0 08/10/2020 0351        Component Value Date/Time    CALCIUM 8.0 (L) 08/10/2020 0351    ALKPHOS 102 05/29/2020 1020    AST 16 05/29/2020 1020    ALT 21 05/29/2020 1020    BILITOT 0.6 05/29/2020 1020            Lab Results   Component Value Date    TSH 0.995 01/28/2022       Lab Results   Component Value Date    WBC 6.1 08/10/2020    HGB 14.1 08/10/2020    HCT 43.9 08/10/2020    MCV 86.8 08/10/2020     08/10/2020         Health Maintenance   Topic Date Due    COVID-19 Vaccine (1) Never done    Colon cancer screen colonoscopy  Never done    Shingles Vaccine (1 of 2) Never done    DTaP/Tdap/Td vaccine (1 - Tdap) 11/16/2006    Pneumococcal 65+ years Vaccine (1 of 1 - PPSV23) Never done   [employer] Visit (AWV)  Never done    A1C test (Diabetic or Prediabetic)  08/10/2021    Lipid screen  08/10/2021    Potassium monitoring  08/10/2021    Creatinine monitoring  08/10/2021    Flu vaccine (1) 09/01/2021    Depression Screen  04/19/2022    Hepatitis C screen  Addressed    Hepatitis A vaccine  Aged Out    Hepatitis B vaccine  Aged Out    Hib vaccine  Aged Out    Meningococcal (ACWY) vaccine  Aged [employer] History   Administered Date(s) Administered    Influenza A (O7X5-04) Vaccine PF IM 12/29/2009    Influenza, Quadv, IM, PF (6 mo and older Fluzone, Flulaval, Fluarix, and 3 yrs and older Afluria) 11/06/2019    Td vaccine (adult) 11/15/2006       SUBJECTIVE/OBJECTIVE:  Review of Systems   Constitutional: Negative for chills and fever.    HENT: Positive for congestion, rhinorrhea, sinus pressure and sinus pain. Respiratory: Negative for cough and shortness of breath. Cardiovascular: Negative for chest pain. Gastrointestinal: Negative for abdominal pain and nausea. Skin: Negative for rash. Neurological: Positive for headaches. Negative for dizziness and light-headedness. Psychiatric/Behavioral: Negative. Physical Exam  Constitutional:       General: He is not in acute distress. HENT:      Nose: Mucosal edema, congestion and rhinorrhea present. Right Sinus: Maxillary sinus tenderness present. Left Sinus: Maxillary sinus tenderness present. Eyes:      Pupils: Pupils are equal, round, and reactive to light. Cardiovascular:      Rate and Rhythm: Normal rate and regular rhythm. Heart sounds: No murmur heard. Pulmonary:      Effort: Pulmonary effort is normal.      Breath sounds: Normal breath sounds. No wheezing. Abdominal:      General: Bowel sounds are normal. There is no distension. Palpations: Abdomen is soft. Tenderness: There is no abdominal tenderness. Musculoskeletal:         General: No tenderness. Normal range of motion. Cervical back: Normal range of motion and neck supple. Skin:     General: Skin is warm and dry. Findings: No rash. ASSESSMENT/PLAN:   Diagnosis Orders   1. Subacute maxillary sinusitis  amoxicillin-clavulanate (AUGMENTIN) 875-125 MG per tablet   2. Chronic diastolic CHF (congestive heart failure) (HCC)  Basic Metabolic Panel   3. Screening PSA (prostate specific antigen)  PSA screening   4. Impaired fasting blood sugar  Hemoglobin A1C    CBC   5. Coronary artery disease involving native coronary artery of native heart without angina pectoris     6. S/P angioplasty with stent of the LM to LCX with 100% lad lesion and mod rca lesion- med RX 06/04/2018     7. Essential hypertension     8. History of thyroid cancer     9.  Acquired hypothyroidism           MDM: Augmentin for Sinus infection   Fluids and rest   Chronic conditions stable   Labs as above   Follow up with Specialists   CRS options discussed - denied   Immunizations discussed   Labs as above   RTO in 1 year          An electronic signature was used to authenticate this note.     --KATIE Ceron - CNP

## 2022-02-21 ENCOUNTER — NURSE ONLY (OUTPATIENT)
Dept: LAB | Age: 67
End: 2022-02-21

## 2022-02-21 DIAGNOSIS — I25.10 CORONARY ARTERY DISEASE INVOLVING NATIVE CORONARY ARTERY OF NATIVE HEART WITHOUT ANGINA PECTORIS: ICD-10-CM

## 2022-02-21 DIAGNOSIS — I10 ESSENTIAL HYPERTENSION: ICD-10-CM

## 2022-02-21 DIAGNOSIS — Z95.820 S/P ANGIOPLASTY WITH STENT: ICD-10-CM

## 2022-02-21 DIAGNOSIS — E78.00 PURE HYPERCHOLESTEROLEMIA: ICD-10-CM

## 2022-02-21 LAB
ALBUMIN SERPL-MCNC: 4.5 G/DL (ref 3.5–5.1)
ALP BLD-CCNC: 126 U/L (ref 38–126)
ALT SERPL-CCNC: 20 U/L (ref 11–66)
AST SERPL-CCNC: 18 U/L (ref 5–40)
BILIRUB SERPL-MCNC: 0.9 MG/DL (ref 0.3–1.2)
BILIRUBIN DIRECT: < 0.2 MG/DL (ref 0–0.3)
CHOLESTEROL, TOTAL: 130 MG/DL (ref 100–199)
HDLC SERPL-MCNC: 52 MG/DL
LDL CHOLESTEROL CALCULATED: 58 MG/DL
TOTAL PROTEIN: 6.8 G/DL (ref 6.1–8)
TRIGL SERPL-MCNC: 99 MG/DL (ref 0–199)

## 2022-02-24 ENCOUNTER — OFFICE VISIT (OUTPATIENT)
Dept: CARDIOLOGY CLINIC | Age: 67
End: 2022-02-24
Payer: MEDICARE

## 2022-02-24 VITALS
DIASTOLIC BLOOD PRESSURE: 92 MMHG | WEIGHT: 190.4 LBS | BODY MASS INDEX: 28.2 KG/M2 | HEIGHT: 69 IN | HEART RATE: 64 BPM | SYSTOLIC BLOOD PRESSURE: 144 MMHG

## 2022-02-24 DIAGNOSIS — I25.10 CORONARY ARTERY DISEASE INVOLVING NATIVE CORONARY ARTERY OF NATIVE HEART WITHOUT ANGINA PECTORIS: Primary | ICD-10-CM

## 2022-02-24 DIAGNOSIS — E78.00 PURE HYPERCHOLESTEROLEMIA: ICD-10-CM

## 2022-02-24 DIAGNOSIS — Z95.820 S/P ANGIOPLASTY WITH STENT: ICD-10-CM

## 2022-02-24 DIAGNOSIS — I10 ESSENTIAL HYPERTENSION: ICD-10-CM

## 2022-02-24 PROBLEM — R55 NEAR SYNCOPE: Status: RESOLVED | Noted: 2018-03-16 | Resolved: 2022-02-24

## 2022-02-24 PROCEDURE — 99214 OFFICE O/P EST MOD 30 MIN: CPT | Performed by: INTERNAL MEDICINE

## 2022-02-24 RX ORDER — METOPROLOL SUCCINATE 50 MG/1
50 TABLET, EXTENDED RELEASE ORAL DAILY
Qty: 90 TABLET | Refills: 3 | Status: SHIPPED | OUTPATIENT
Start: 2022-02-24

## 2022-02-24 NOTE — PROGRESS NOTES
Chief Complaint   Patient presents with    6 Month Follow-Up     Originally  patient here - ref by Dr. Vickey Grimes - NSTEMI  referred from [facility] post Thyriod surgery  He sufferred NSTEMI post op in [facility]      Pt is here for: 6 month follow up    Last EKG was done on: 07/20/2021    No dizziness    Denied cp, sob, dizziness, swelling or palpitations    Reviewed the record from [facility]          Patient Active Problem List   Diagnosis    Tinnitus    Hearing loss d/t noise    Essential hypertension    Impaired fasting blood sugar    Primary osteoarthritis of left knee    Liver disease    Orthostatic hypotension    S/P angioplasty with stent of the LM to LCX with 100% lad lesion and mod rca lesion- med RX 06/04/2018    Coronary artery disease involving native coronary artery of native heart without angina pectoris    Non-ST elevation (NSTEMI) myocardial infarction ([address]    Gait instability    Left-sided weakness    Otorrhagia of left ear    Hyperlipidemia    Chronic diastolic CHF (congestive heart failure) (HCC)    History of thyroid cancer    Chronic nonintractable headache       Past Surgical History:   Procedure Laterality Date    CARPAL TUNNEL RELEASE      CHOLECYSTECTOMY      HERNIA REPAIR      THYROIDECTOMY      TONSILLECTOMY AND ADENOIDECTOMY      TOTAL KNEE ARTHROPLASTY Left        Allergies   Allergen Reactions    Pepto-Bismol [Bismuth Subsalicylate] Nausea And Vomiting        Family History   Problem Relation Age of Onset    Heart Disease Mother     High Blood Pressure Mother     High Cholesterol Mother     Arthritis Mother     Miscarriages / Stillbirths Mother     Heart Disease Father     Arthritis Father     Cancer Brother         melanoma        Social History     Socioeconomic History    Marital status:      Spouse name: Angelo Lr Number of children: 4    Years of education: 15    Highest education level: Some college, no degree   Occupational History    Not on file Tobacco Use    Smoking status: Never Smoker    Smokeless tobacco: Never Used   Vaping Use    Vaping Use: Never used   Substance and Sexual Activity    Alcohol use: No    Drug use: No    Sexual activity: Yes     Partners: Female   Other Topics Concern    Not on file   Social History Narrative    Not on file     Social Determinants of Health     Financial Resource Strain: Low Risk     Difficulty of Paying Living Expenses: Not hard at all   Food Insecurity: No Food Insecurity    Worried About [address] in the Last Year: Never true    [address] [facility] in the Last Year: Never true   Transportation Needs:     Lack of Transportation (Medical): Not on file    Lack of Transportation (Non-Medical):  Not on file   Physical Activity:     Days of Exercise per Week: Not on file    Minutes of Exercise per Session: Not on file   Stress:     Feeling of Stress : Not on file   Social Connections:     Frequency of Communication with Friends and Family: Not on file    Frequency of Social Gatherings with Friends and Family: Not on file    Attends Orthodoxy Services: Not on file    Active Member of [address] [place] [address] or Organizations: Not on file    Attends Club or Organization Meetings: Not on file    Marital Status: Not on file   Intimate Partner Violence:     Fear of Current or Ex-Partner: Not on file    Emotionally Abused: Not on file    Physically Abused: Not on file    Sexually Abused: Not on file   Housing Stability:     Unable to Pay for Housing in the Last Year: Not on file    Number of [place] in the Last Year: Not on file    Unstable Housing in the Last Year: Not on file       Current Outpatient Medications   Medication Sig Dispense Refill    metoprolol succinate (TOPROL XL) 50 MG extended release tablet Take 1 tablet by mouth daily 90 tablet 3    atorvastatin (LIPITOR) 40 MG tablet TAKE 1 TABLET BY MOUTH DAILY 30 tablet 11    aspirin 81 MG chewable tablet CHEW AND SWALLOW 1 TABLET BY MOUTH DAILY 30 tablet 11    clopidogrel (PLAVIX) 75 MG tablet TAKE 1 TABLET BY MOUTH DAILY 30 tablet 11    Blood Pressure Monitoring (B-D ASSURE BPM/DELUXE ARM CUFF) MISC 1 kit by Does not apply route daily 1 each 0    sildenafil (VIAGRA) 100 MG tablet Take 1 tablet by mouth daily as needed for Erectile Dysfunction 30 tablet 1    Multiple Vitamins-Minerals (MENS 50+ MULTI VITAMIN/MIN) TABS Take by mouth daily      acetaminophen (TYLENOL) 500 MG tablet Take 1 tablet by mouth 4 times daily as needed for Pain (for neck pain and headaches) 360 tablet 1    levothyroxine (SYNTHROID) 125 MCG tablet        No current facility-administered medications for this visit. Review of Systems -     General ROS: negative  Psychological ROS: negative  Hematological and Lymphatic ROS: No history of blood clots or bleeding disorder. Respiratory ROS: no cough, shortness of breath, or wheezing  Cardiovascular ROS: no chest pain or dyspnea on exertion  Gastrointestinal ROS: negative  Genito-Urinary ROS: no dysuria, trouble voiding, or hematuria  Musculoskeletal ROS: negative  Neurological ROS: no TIA or stroke symptoms  Dermatological ROS: negative      Blood pressure (!) 144/92, pulse 64, height 5' 9\" (1.753 m), weight 190 lb 6.4 oz (86.4 kg).         Physical Examination:    General appearance - alert, well appearing, and in no distress  Mental status - alert, oriented to person, place, and time  Neck - supple, no significant adenopathy, no JVD, or carotid bruits  Chest - clear to auscultation, no wheezes, rales or rhonchi, symmetric air entry  Heart - normal rate, regular rhythm, normal S1, S2, no murmurs, rubs, clicks or gallops  Abdomen - soft, nontender, nondistended, no masses or organomegaly  Neurological - alert, oriented, normal speech, no focal findings or movement disorder noted  Musculoskeletal - no joint tenderness, deformity or swelling  Extremities - peripheral pulses normal, no pedal edema, no clubbing or cyanosis  Skin - normal coloration and turgor, no rashes, no suspicious skin lesions noted    Lab  No results for input(s): CKTOTAL, CKMB, CKMBINDEX, TROPONINI in the last 72 hours. CBC:   Lab Results   Component Value Date    WBC 6.1 08/10/2020    RBC 5.06 08/10/2020    HGB 14.1 08/10/2020    HCT 43.9 08/10/2020    MCV 86.8 08/10/2020    MCH 27.9 08/10/2020    MCHC 32.1 08/10/2020    RDW 15.6 03/17/2018     08/10/2020    MPV 10.0 08/10/2020     BMP:    Lab Results   Component Value Date     08/10/2020    K 3.9 08/10/2020     08/10/2020    CO2 20 08/10/2020    BUN 15 08/10/2020    LABALBU 4.5 02/21/2022    CREATININE 1.0 08/10/2020    CALCIUM 8.0 08/10/2020    LABGLOM 75 08/10/2020    GLUCOSE 111 08/10/2020     Hepatic Function Panel:    Lab Results   Component Value Date    ALKPHOS 126 02/21/2022    ALT 20 02/21/2022    AST 18 02/21/2022    PROT 6.8 02/21/2022    BILITOT 0.9 02/21/2022    BILIDIR <0.2 02/21/2022    LABALBU 4.5 02/21/2022     Magnesium:    Lab Results   Component Value Date    MG 2.2 08/10/2020     Warfarin PT/INR:  No components found for: PTPATWAR, PTINRWAR  HgBA1c:    Lab Results   Component Value Date    LABA1C 6.2 08/10/2020     FLP:    Lab Results   Component Value Date    TRIG 99 02/21/2022    HDL 52 02/21/2022    LDLCALC 58 02/21/2022     TSH:    Lab Results   Component Value Date    TSH 0.995 01/28/2022 06/04/2018 post op cath at [facility] summary:  · There is 100% chronic total occlusion for the LAD with R to L   collaterals  · There is 100% acute thrombotic occlusion of the left main to LCx   stenosis that represents culprit lesion  · Moderate non obstructive disease in RCA with R to L collaterals  · LVEDP mildly elevated at 15 mmHg; No LV-AO gradient on pull back    Intervention summary:  · Complex PCI of left main-LCx using OTW.  Femoral access was obtained at   beginning of procedure in anticipation of possible hemodynamic support  · The left main-LCx lesion was pre-dilated (Gen9 [id number]) and   treated with BMSx1 ( BMS INTEGRITY 3.50 X 18). After deployment there was   no residual stenosis, no evidence of dissection and ASHA III flow   distally. Stent was then post dilated (BALL 3.5 X 12 RX NC EUPHO)    Recommendations  · Aspirin indefinitely. Plavix for at least 1 month given, preferably   longer given NSTEMI  · Transfer to Heart 3 team for further care    Echo 06/04/2018    Indication:  S/P NSTEMI, preop CABG        Findings   L Ventricle: The left ventricle is enlarged. There is no left ventricular  hypertrophy. The left ventricular septal wall appears aneurysmal.  There  is diffuse global hypokinesis. The estimated ejection fraction is  55-60%, consistent with normal systolic function. The left ventricular  biplane EF is 55%. EF by 3D echo was 55%. There is an E to A reversal in  the mitral valve flow pattern suggestive of diastolic dysfunction. The   basal anterolateral, and  mid anterolateral wall segments are  hypokinetic (score 2). Overall wallmotion score index is  1.13 The  global longitudinal strain was -16.8%. EKG 7/10/18  Sinus  Rhythm   WITHIN NORMAL LIMITS    ekg  9/30/19  NSR, No acute abn  TW abn    Conclusions      Summary   Normal left ventricle size and systolic function. Ejection fraction was   estimated at 55 %. There were no regional left ventricular wall motion   abnormalities and wall thickness was within normal limits. Doppler parameters were consistent with abnormal left ventricular   relaxation (grade 1 diastolic dysfunction). The left atrium is Mildly dilated.       Signature      ----------------------------------------------------------------   Electronically signed by Katarzyna Briscoe MD (Interpreting   physician) on 08/10/2020 at 05:45 PM   ----------------------------------------------------------------      ekg  08/2020  Normal sinus rhythm  Minimal voltage criteria for LVH, may be normal variant  Septal infarct (cited on or before 30-SEP-2018)  Abnormal ECG  When compared with ECG of 28-DEC-2019 18:46,  No significant change was found    ekg 7/20/21  Sinus  Rhythm   WITHIN NORMAL LIMITS      Assessment   Diagnosis Orders   1. Coronary artery disease involving native coronary artery of native heart without angina pectoris     2. Essential hypertension     3. Pure hypercholesterolemia     4. S/P angioplasty with stent of the LM to LCX with 100% lad lesion and mod rca lesion- med RX 06/04/2018         Plan   The most  current meds and labs reviewed    patient is advised to exercise 30 min s a day three times a week and about weight loss ,balance diet and     More fruits and vegetables . Advised aerobic exercise  tOTAL LAD GET COLLATERAL FROM RCA  MED RX  MINIMAL RESISTANCE EXERCISE   DO NOT RECOMMEND WORK WITH LIFTING HEAVY OBJECT LIKE 48 LB ALL THE TIME DURING THE WORK      Coronary artery disease, seems to be stable. Denies angina or change in breathing pattern  Cont asa and plavix  Cont statins and BB  Low salt diet  Active  No limitation  Increase toprol xl 50 po qd      Hx of dizziness   Hx Low Normal BP- normalized after hydration  Advised hydration     Hyperlipidemia: on statins, followed periodically. Patient need periodic lipid and liver profile. D/w the pat the plan of care    Lab prior to next visit    patient is advised to exercise 30 min s a day three times a week and about weight loss ,balance diet and     More fruits and vegetables . Sharlene Huerta is Doing well and stable    Discussed use, benefit, and side effects of prescribed medications. All patient questions answered. Pt voiced understanding. Instructed to continue current medications, diet and exercise. Continue risk factor modification and medical management. Patient agreed with treatment plan. Follow up as directed.       RTC in 6 months    Krishan Peterson, [facility]

## 2022-06-27 RX ORDER — CLOPIDOGREL BISULFATE 75 MG/1
75 TABLET ORAL DAILY
Qty: 30 TABLET | Refills: 11 | Status: SHIPPED | OUTPATIENT
Start: 2022-06-27

## 2022-07-07 RX ORDER — ASPIRIN 81 MG/1
TABLET, CHEWABLE ORAL
Qty: 30 TABLET | Refills: 11 | Status: SHIPPED | OUTPATIENT
Start: 2022-07-07

## 2022-08-31 ENCOUNTER — TELEPHONE (OUTPATIENT)
Dept: FAMILY MEDICINE CLINIC | Age: 67
End: 2022-08-31

## 2022-08-31 NOTE — TELEPHONE ENCOUNTER
----- Message from Meño España sent at 8/31/2022 10:54 AM EDT -----  Subject: Message to Provider    QUESTIONS  Information for Provider? How long should he be push fluids and rest? He   states not given a time frame Please call and advise.  ---------------------------------------------------------------------------  --------------  Cassidy Hartley INFO  [phone]; OK to leave message on voicemail  ---------------------------------------------------------------------------  --------------  SCRIPT ANSWERS  Relationship to Patient?  Self

## 2022-08-31 NOTE — TELEPHONE ENCOUNTER
----- Message from Karl Ambrosio sent at 8/31/2022 12:31 PM EDT -----  Subject: Message to Provider    QUESTIONS  Information for Provider? Patient called back with Fax# to employer   [phone]  ---------------------------------------------------------------------------  --------------  [employer]  [phone]; OK to leave message on voicemail  ---------------------------------------------------------------------------  --------------  SCRIPT ANSWERS  Relationship to Patient?  Self

## 2022-08-31 NOTE — TELEPHONE ENCOUNTER
Continue to push fluids, rest but stay active around house. DB and cough through the day to keep lungs open. OTC for symptom relief.

## 2022-08-31 NOTE — TELEPHONE ENCOUNTER
----- Message from [address] sent at 8/31/2022  8:19 AM EDT -----  Subject: Message to Provider    QUESTIONS  Information for Provider? pt has tested positive for covid on 08/30 pt is   feeling a bit better but would like a call back to give advise on how to   keep it that way.   ---------------------------------------------------------------------------  --------------  [facility]  [phone]; OK to leave message on voicemail  ---------------------------------------------------------------------------  --------------  SCRIPT ANSWERS  Relationship to Patient?  Self

## 2022-08-31 NOTE — TELEPHONE ENCOUNTER
Push fluids and rest till feel back to his normal.  Letter placed in nurse bin.   RTW this Friday 9/2/22 (5 days from symptoms onset) and return to work wearing a mask for 5 days

## 2022-08-31 NOTE — TELEPHONE ENCOUNTER
Pt will need a note stating when he is off work and when he is to return to work. Pt will get the fax# to his employer that is in [place]. Call pt back when letter is ready. Please advise. Pt says he woke up with a HA Sunday and thought it was sinuses. Monday he felt \"bad\". Last night he took a COVID test and it was positive.

## 2022-09-22 ENCOUNTER — OFFICE VISIT (OUTPATIENT)
Dept: CARDIOLOGY CLINIC | Age: 67
End: 2022-09-22
Payer: MEDICARE

## 2022-09-22 VITALS
WEIGHT: 178.2 LBS | HEART RATE: 66 BPM | HEIGHT: 69 IN | BODY MASS INDEX: 26.39 KG/M2 | DIASTOLIC BLOOD PRESSURE: 90 MMHG | SYSTOLIC BLOOD PRESSURE: 148 MMHG

## 2022-09-22 DIAGNOSIS — I25.10 CORONARY ARTERY DISEASE INVOLVING NATIVE CORONARY ARTERY OF NATIVE HEART WITHOUT ANGINA PECTORIS: Primary | ICD-10-CM

## 2022-09-22 DIAGNOSIS — I95.1 ORTHOSTATIC HYPOTENSION: ICD-10-CM

## 2022-09-22 DIAGNOSIS — I10 ESSENTIAL HYPERTENSION: ICD-10-CM

## 2022-09-22 DIAGNOSIS — Z95.820 S/P ANGIOPLASTY WITH STENT: ICD-10-CM

## 2022-09-22 DIAGNOSIS — I50.32 CHRONIC DIASTOLIC CHF (CONGESTIVE HEART FAILURE) (HCC): ICD-10-CM

## 2022-09-22 DIAGNOSIS — E78.00 PURE HYPERCHOLESTEROLEMIA: ICD-10-CM

## 2022-09-22 PROCEDURE — 1123F ACP DISCUSS/DSCN MKR DOCD: CPT | Performed by: INTERNAL MEDICINE

## 2022-09-22 PROCEDURE — 93000 ELECTROCARDIOGRAM COMPLETE: CPT | Performed by: INTERNAL MEDICINE

## 2022-09-22 PROCEDURE — 99214 OFFICE O/P EST MOD 30 MIN: CPT | Performed by: INTERNAL MEDICINE

## 2022-09-22 RX ORDER — LISINOPRIL 5 MG/1
5 TABLET ORAL DAILY
Qty: 30 TABLET | Refills: 8 | Status: SHIPPED | OUTPATIENT
Start: 2022-09-22

## 2022-09-22 RX ORDER — LISINOPRIL 5 MG/1
5 TABLET ORAL DAILY
Qty: 90 TABLET | OUTPATIENT
Start: 2022-09-22

## 2022-09-22 NOTE — PROGRESS NOTES
Chief Complaint   Patient presents with    6 Month Follow-Up    Coronary Artery Disease     Originally  patient here - ref by Dr. Liban Cristina - NSTEMI  referred from [facility] post Thyriod surgery  He sufferred NSTEMI post op in [facility]            6 month follow up. EKG done today.     No dizziness    Denied cp, sob, dizziness, swelling or palpitations    Reviewed the record from [facility]          Patient Active Problem List   Diagnosis    Tinnitus    Hearing loss d/t noise    Essential hypertension    Impaired fasting blood sugar    Primary osteoarthritis of left knee    Liver disease    Orthostatic hypotension    S/P angioplasty with stent of the LM to LCX with 100% lad lesion and mod rca lesion- med RX 06/04/2018    Coronary artery disease involving native coronary artery of native heart without angina pectoris    Non-ST elevation (NSTEMI) myocardial infarction [facility])    Gait instability    Left-sided weakness    Otorrhagia of left ear    Hyperlipidemia    Chronic diastolic CHF (congestive heart failure) (HCC)    History of thyroid cancer    Chronic nonintractable headache       Past Surgical History:   Procedure Laterality Date    CARPAL TUNNEL RELEASE      CHOLECYSTECTOMY      HERNIA REPAIR      THYROIDECTOMY      TONSILLECTOMY AND ADENOIDECTOMY      TOTAL KNEE ARTHROPLASTY Left        Allergies   Allergen Reactions    Pepto-Bismol [Bismuth Subsalicylate] Nausea And Vomiting        Family History   Problem Relation Age of Onset    Heart Disease Mother     High Blood Pressure Mother     High Cholesterol Mother     Arthritis Mother     Miscarriages / Stillbirths Mother     Heart Disease Father     Arthritis Father     Cancer Brother         melanoma        Social History     Socioeconomic History    Marital status:      Spouse name: Diandra    Number of children: 4    Years of education: 14    Highest education level: Some college, no degree   Occupational History    Not on file   Tobacco Use    Smoking status: Never Smokeless tobacco: Never   Vaping Use    Vaping Use: Never used   Substance and Sexual Activity    Alcohol use: No    Drug use: No    Sexual activity: Yes     Partners: Female   Other Topics Concern    Not on file   Social History Narrative    Not on file     Social Determinants of Health     Financial Resource Strain: Low Risk     Difficulty of Paying Living Expenses: Not hard at all   Food Insecurity: No Food Insecurity    Worried About Running Out of Food in the Last Year: Never true    Ran Out of Food in the Last Year: Never true   Transportation Needs: Not on file   Physical Activity: Not on file   Stress: Not on file   Social Connections: Not on file   Intimate Partner Violence: Not on file   Housing Stability: Not on file       Current Outpatient Medications   Medication Sig Dispense Refill    aspirin 81 MG chewable tablet CHEW AND SWALLOW 1 TABLET BY MOUTH DAILY 30 tablet 11    clopidogrel (PLAVIX) 75 MG tablet TAKE 1 TABLET BY MOUTH DAILY 30 tablet 11    metoprolol succinate (TOPROL XL) 50 MG extended release tablet Take 1 tablet by mouth daily 90 tablet 3    atorvastatin (LIPITOR) 40 MG tablet TAKE 1 TABLET BY MOUTH DAILY 30 tablet 11    Blood Pressure Monitoring (B-D ASSURE BPM/DELUXE ARM CUFF) MISC 1 kit by Does not apply route daily 1 each 0    sildenafil (VIAGRA) 100 MG tablet Take 1 tablet by mouth daily as needed for Erectile Dysfunction 30 tablet 1    Multiple Vitamins-Minerals (MENS 50+ MULTI VITAMIN/MIN) TABS Take by mouth daily      acetaminophen (TYLENOL) 500 MG tablet Take 1 tablet by mouth 4 times daily as needed for Pain (for neck pain and headaches) 360 tablet 1    levothyroxine (SYNTHROID) 125 MCG tablet        No current facility-administered medications for this visit. Review of Systems -     General ROS: negative  Psychological ROS: negative  Hematological and Lymphatic ROS: No history of blood clots or bleeding disorder.    Respiratory ROS: no cough, shortness of breath, or GLUCOSE 111 08/10/2020 03:51 AM     Hepatic Function Panel:    Lab Results   Component Value Date/Time    ALKPHOS 126 02/21/2022 08:45 AM    ALT 20 02/21/2022 08:45 AM    AST 18 02/21/2022 08:45 AM    PROT 6.8 02/21/2022 08:45 AM    BILITOT 0.9 02/21/2022 08:45 AM    BILIDIR <0.2 02/21/2022 08:45 AM    LABALBU 4.5 02/21/2022 08:45 AM     Magnesium:    Lab Results   Component Value Date/Time    MG 2.2 08/10/2020 04:06 AM     Warfarin PT/INR:  No components found for: PTPATWAR, PTINRWAR  HgBA1c:    Lab Results   Component Value Date/Time    LABA1C 6.2 08/10/2020 03:51 AM     FLP:    Lab Results   Component Value Date/Time    TRIG 99 02/21/2022 08:45 AM    HDL 52 02/21/2022 08:45 AM    LDLCALC 58 02/21/2022 08:45 AM     TSH:    Lab Results   Component Value Date/Time    TSH 0.995 01/28/2022 03:32 PM     06/04/2018 post op cath at [facility] summary:  · There is 100% chronic total occlusion for the LAD with R to L   collaterals  · There is 100% acute thrombotic occlusion of the left main to LCx   stenosis that represents culprit lesion  · Moderate non obstructive disease in RCA with R to L collaterals  · LVEDP mildly elevated at 15 mmHg; No LV-AO gradient on pull back    Intervention summary:  · Complex PCI of left main-LCx using OTW. Femoral access was obtained at   beginning of procedure in anticipation of possible hemodynamic support  · The left main-LCx lesion was pre-dilated (BALL MEDTRONIC [id number]) and   treated with BMSx1 ( BMS INTEGRITY 3.50 X 18). After deployment there was   no residual stenosis, no evidence of dissection and ASHA III flow   distally. Stent was then post dilated (BALL 3.5 X 12 RX NC EUPHO)    Recommendations  · Aspirin indefinitely. Plavix for at least 1 month given, preferably   longer given NSTEMI  · Transfer to Heart 3 team for further care    Echo 06/04/2018    Indication:  S/P NSTEMI, preop CABG        Findings   L Ventricle: The left ventricle is enlarged.  There is no left ventricular  hypertrophy. The left ventricular septal wall appears aneurysmal.  There  is diffuse global hypokinesis. The estimated ejection fraction is  55-60%, consistent with normal systolic function. The left ventricular  biplane EF is 55%. EF by 3D echo was 55%. There is an E to A reversal in  the mitral valve flow pattern suggestive of diastolic dysfunction. The   basal anterolateral, and  mid anterolateral wall segments are  hypokinetic (score 2). Overall wallmotion score index is  1.13 The  global longitudinal strain was -16.8%. EKG 7/10/18  Sinus  Rhythm   WITHIN NORMAL LIMITS    ekg  9/30/19  NSR, No acute abn  TW abn    Conclusions      Summary   Normal left ventricle size and systolic function. Ejection fraction was   estimated at 55 %. There were no regional left ventricular wall motion   abnormalities and wall thickness was within normal limits. Doppler parameters were consistent with abnormal left ventricular   relaxation (grade 1 diastolic dysfunction). The left atrium is Mildly dilated. Signature      ----------------------------------------------------------------   Electronically signed by Carlie Cooley MD (Interpreting   physician) on 08/10/2020 at 05:45 PM   ----------------------------------------------------------------      ekg  08/2020  Normal sinus rhythm  Minimal voltage criteria for LVH, may be normal variant  Septal infarct (cited on or before 30-SEP-2018)  Abnormal ECG  When compared with ECG of 28-DEC-2019 18:46,  No significant change was found    ekg 7/20/21  Sinus  Rhythm   WITHIN NORMAL LIMITS    Ekg 9/22/22  Sinus  Rhythm   -Prominent R(V1) and left axis -nonspecific  -Seen with pulmonary disease -possible anterior fascicular block. ABNORMAL         Assessment   Diagnosis Orders   1. Coronary artery disease involving native coronary artery of native heart without angina pectoris  EKG 12 Lead      2. Chronic diastolic CHF (congestive heart failure) ([address]        3. Essential hypertension        4. Pure hypercholesterolemia        5. Orthostatic hypotension        6. S/P angioplasty with stent of the LM to LCX with 100% lad lesion and mod rca lesion- med RX 06/04/2018            Plan   The current meds and labs reviewed    patient is advised to exercise 30 min s a day three times a week and about weight loss ,balance diet and     More fruits and vegetables . Advised aerobic exercise  tOTAL LAD GET COLLATERAL FROM RCA  MED RX  MINIMAL RESISTANCE EXERCISE   DO NOT RECOMMEND WORK WITH LIFTING HEAVY OBJECT LIKE 48 LB ALL THE TIME DURING THE WORK      Coronary artery disease, seems to be stable. Denies angina or change in breathing pattern  Cont asa and plavix  Cont statins and BB  Low salt diet  Active  No limitation  Cont  toprol xl 50 po qd      >htn  Hypertension, on medical treatment. Seems to be under good control. Patient is compliant with medical treatment. Need better control  Start lisinopril 5 mg po qd  Hx of dizziness   Hx Low Normal BP- normalized after hydration 2018  Advised hydration     Hyperlipidemia: on statins, followed periodically. Patient need periodic lipid and liver profile. Lab prior to next visit    patient is advised to exercise 30 min s a day three times a week and about weight loss ,balance diet and     More fruits and vegetables . Overall Vicki Kidney is Doing well and stable    Discussed use, benefit, and side effects of prescribed medications. All patient questions answered. Pt voiced understanding. Instructed to continue current medications, diet and exercise. Continue risk factor modification and medical management. Patient agreed with treatment plan. Follow up as directed.       RTC in 6 months    [facility]

## 2022-10-10 RX ORDER — SILDENAFIL 100 MG/1
100 TABLET, FILM COATED ORAL DAILY PRN
Qty: 30 TABLET | Refills: 1 | Status: SHIPPED | OUTPATIENT
Start: 2022-10-10

## 2022-10-10 NOTE — TELEPHONE ENCOUNTER
Eldon called requesting a refill of the below medication which has been pended for you:     Requested Prescriptions     Pending Prescriptions Disp Refills    sildenafil (VIAGRA) 100 MG tablet 30 tablet 1     Sig: Take 1 tablet by mouth daily as needed for Erectile Dysfunction       Last Appointment Date: 2/8/2022  Next Appointment Date: Visit date not found    Allergies   Allergen Reactions    Pepto-Bismol [Bismuth Subsalicylate] Nausea And Vomiting       If no call back patient will check with Yaniv Booker at 2 pm today.

## 2022-10-26 RX ORDER — ATORVASTATIN CALCIUM 40 MG/1
40 TABLET, FILM COATED ORAL DAILY
Qty: 30 TABLET | Refills: 11 | Status: SHIPPED | OUTPATIENT
Start: 2022-10-26

## 2023-02-23 ENCOUNTER — TELEMEDICINE (OUTPATIENT)
Dept: FAMILY MEDICINE CLINIC | Age: 68
End: 2023-02-23

## 2023-02-23 DIAGNOSIS — I50.32 CHRONIC DIASTOLIC CHF (CONGESTIVE HEART FAILURE) (HCC): ICD-10-CM

## 2023-02-23 DIAGNOSIS — J45.21 MILD INTERMITTENT REACTIVE AIRWAY DISEASE WITH ACUTE EXACERBATION: Primary | ICD-10-CM

## 2023-02-23 RX ORDER — PREDNISONE 20 MG/1
20 TABLET ORAL 2 TIMES DAILY
Qty: 10 TABLET | Refills: 0 | Status: SHIPPED | OUTPATIENT
Start: 2023-02-23 | End: 2023-02-28

## 2023-02-23 ASSESSMENT — ENCOUNTER SYMPTOMS
CHEST TIGHTNESS: 0
ABDOMINAL PAIN: 0
NAUSEA: 0
COUGH: 1
SHORTNESS OF BREATH: 0
RHINORRHEA: 1

## 2023-02-23 NOTE — PROGRESS NOTES
Subjective:      Patient ID: Dao Jama is a 79 y.o. male    Cough  Associated symptoms include postnasal drip and rhinorrhea. Pertinent negatives include no chest pain, chills, fever, headaches, rash or shortness of breath. : Acute for cough    TELEHEALTH EVALUATION -- Audio/Visual (During NIK- public health emergency)    Patient identification was verified at the start of the visit: Yes    Services were provided through a video synchronous discussion virtually to substitute for in-person clinic visit. Patient and provider were located at their individual homes. Patient has requested an audio/video evaluation for the following concern(s):    Chief Complaint   Patient presents with    Cough       Onset of 1 week ago with cough. Walking outside in cold will get coughing spells and chest tightness. DB will induce cough. Dry cough. Runny nose. Denies nasal congestion. No fever or chills. Body aches. Denies fatigue. Patient Active Problem List   Diagnosis    Tinnitus    Hearing loss d/t noise    Essential hypertension    Impaired fasting blood sugar    Primary osteoarthritis of left knee    Liver disease    Orthostatic hypotension    S/P angioplasty with stent of the LM to LCX with 100% lad lesion and mod rca lesion- med RX 06/04/2018    Coronary artery disease involving native coronary artery of native heart without angina pectoris    Non-ST elevation (NSTEMI) myocardial infarction [facility])    Gait instability    Left-sided weakness    Otorrhagia of left ear    Hyperlipidemia    Chronic diastolic CHF (congestive heart failure) (HCC)    History of thyroid cancer    Chronic nonintractable headache       Review of Systems   Constitutional:  Negative for chills, fatigue and fever. HENT:  Positive for postnasal drip and rhinorrhea. Negative for congestion. Respiratory:  Positive for cough. Negative for chest tightness and shortness of breath. Cardiovascular:  Negative for chest pain. Gastrointestinal:  Negative for abdominal pain and nausea. Skin:  Negative for rash. Neurological:  Negative for dizziness, light-headedness and headaches. Psychiatric/Behavioral: Negative. Objective:   Physical Exam  Constitutional:       General: He is not in acute distress. Appearance: He is not ill-appearing. Pulmonary:      Effort: Pulmonary effort is normal. No respiratory distress. Neurological:      Mental Status: He is alert and oriented to person, place, and time. Psychiatric:         Mood and Affect: Mood normal.         Behavior: Behavior normal.       Assessment:       Diagnosis Orders   1. Mild intermittent reactive airway disease with acute exacerbation  predniSONE (DELTASONE) 20 MG tablet      2. Chronic diastolic CHF (congestive heart failure) ([facility] [address]            Plan:     Orders as above   Fluids and rest  WEar mask/covering while outside - warm up, humidified air  RTO if symptoms worsen or stay the same          Ryannmarcos Monzon was evaluated through a synchronous (real-time) audio-video encounter. The patient (or guardian if applicable) is aware that this is a billable service, which includes applicable co-pays. This Virtual Visit was conducted with patient's (and/or legal guardian's) consent. The visit was conducted pursuant to the emergency declaration under the [facility], [facility] authority and the [employer] General Act. Patient identification was verified, and a caregiver was present when appropriate. The patient was located at Home: [address] [address]  Provider was located at [facility] (Appt Dept): [address]  [facility] [place] [address],  [address]         Total time spent for this encounter: Not billed by time    --KATIE Sharpe CNP on 2/23/2023 at 12:17 PM    An electronic signature was used to authenticate this note.

## 2023-03-02 ENCOUNTER — TELEPHONE (OUTPATIENT)
Dept: FAMILY MEDICINE CLINIC | Age: 68
End: 2023-03-02

## 2023-03-02 RX ORDER — AZITHROMYCIN 250 MG/1
TABLET, FILM COATED ORAL
Qty: 6 TABLET | Refills: 0 | Status: SHIPPED | OUTPATIENT
Start: 2023-03-02 | End: 2023-03-12

## 2023-03-02 NOTE — TELEPHONE ENCOUNTER
Pt called office stating he was started on Prednisone x5days at his last appt 2/23/23. Pt says he is no better. He is still getting the pain in his chest when he goes outside in the cold. Pt thinks it might be acute bronchitis. Please advise.

## 2023-03-28 RX ORDER — METOPROLOL SUCCINATE 50 MG/1
50 TABLET, EXTENDED RELEASE ORAL DAILY
Qty: 90 TABLET | Refills: 3 | Status: SHIPPED | OUTPATIENT
Start: 2023-03-28

## 2023-04-13 PROBLEM — R94.31 ABNORMAL EKG: Status: ACTIVE | Noted: 2023-04-13

## 2023-04-13 PROBLEM — R06.02 SOB (SHORTNESS OF BREATH) ON EXERTION: Status: ACTIVE | Noted: 2023-04-13

## 2023-04-13 PROBLEM — R07.9 CHEST PAIN ON EXERTION: Status: ACTIVE | Noted: 2023-04-13

## 2023-04-13 PROBLEM — I25.10 CORONARY ARTERY DISEASE INVOLVING NATIVE CORONARY ARTERY OF NATIVE HEART: Status: ACTIVE | Noted: 2023-04-13

## 2023-04-28 ENCOUNTER — HOSPITAL ENCOUNTER (OUTPATIENT)
Dept: NON INVASIVE DIAGNOSTICS | Age: 68
Discharge: HOME | End: 2023-04-28
Payer: MEDICARE

## 2023-04-28 DIAGNOSIS — E78.00 PURE HYPERCHOLESTEROLEMIA: ICD-10-CM

## 2023-04-28 DIAGNOSIS — I50.32 CHRONIC DIASTOLIC CHF (CONGESTIVE HEART FAILURE) (HCC): ICD-10-CM

## 2023-04-28 DIAGNOSIS — I25.10 CORONARY ARTERY DISEASE INVOLVING NATIVE CORONARY ARTERY OF NATIVE HEART, UNSPECIFIED WHETHER ANGINA PRESENT: ICD-10-CM

## 2023-04-28 DIAGNOSIS — I10 ESSENTIAL HYPERTENSION: ICD-10-CM

## 2023-04-28 DIAGNOSIS — R06.02 SOB (SHORTNESS OF BREATH) ON EXERTION: ICD-10-CM

## 2023-04-28 DIAGNOSIS — I95.1 ORTHOSTATIC HYPOTENSION: ICD-10-CM

## 2023-04-28 DIAGNOSIS — R94.31 ABNORMAL EKG: ICD-10-CM

## 2023-04-28 DIAGNOSIS — Z95.820 S/P ANGIOPLASTY WITH STENT: ICD-10-CM

## 2023-04-28 DIAGNOSIS — R07.9 CHEST PAIN ON EXERTION: ICD-10-CM

## 2023-04-28 LAB
LV EF: 38 %
LVEF MODALITY: NORMAL

## 2023-04-28 PROCEDURE — 93306 TTE W/DOPPLER COMPLETE: CPT

## 2023-04-28 PROCEDURE — A9500 TC99M SESTAMIBI: HCPCS | Performed by: INTERNAL MEDICINE

## 2023-04-28 PROCEDURE — 78452 HT MUSCLE IMAGE SPECT MULT: CPT | Performed by: INTERNAL MEDICINE

## 2023-04-28 PROCEDURE — 3430000000 HC RX DIAGNOSTIC RADIOPHARMACEUTICAL: Performed by: INTERNAL MEDICINE

## 2023-04-28 PROCEDURE — 93017 CV STRESS TEST TRACING ONLY: CPT | Performed by: INTERNAL MEDICINE

## 2023-04-28 PROCEDURE — 6360000002 HC RX W HCPCS

## 2023-04-28 PROCEDURE — 96367 TX/PROPH/DG ADDL SEQ IV INF: CPT | Performed by: INTERNAL MEDICINE

## 2023-04-28 PROCEDURE — 96374 THER/PROPH/DIAG INJ IV PUSH: CPT | Performed by: INTERNAL MEDICINE

## 2023-04-28 RX ORDER — TETRAKIS(2-METHOXYISOBUTYLISOCYANIDE)COPPER(I) TETRAFLUOROBORATE 1 MG/ML
9.7 INJECTION, POWDER, LYOPHILIZED, FOR SOLUTION INTRAVENOUS
Status: COMPLETED | OUTPATIENT
Start: 2023-04-28 | End: 2023-04-28

## 2023-04-28 RX ORDER — TETRAKIS(2-METHOXYISOBUTYLISOCYANIDE)COPPER(I) TETRAFLUOROBORATE 1 MG/ML
34.4 INJECTION, POWDER, LYOPHILIZED, FOR SOLUTION INTRAVENOUS
Status: COMPLETED | OUTPATIENT
Start: 2023-04-28 | End: 2023-04-28

## 2023-04-28 RX ADMIN — Medication 9.7 MILLICURIE: at 08:40

## 2023-04-28 RX ADMIN — Medication 34.4 MILLICURIE: at 10:02

## 2023-05-02 ENCOUNTER — TELEPHONE (OUTPATIENT)
Dept: CARDIOLOGY CLINIC | Age: 68
End: 2023-05-02

## 2023-05-02 ENCOUNTER — HOSPITAL ENCOUNTER (OUTPATIENT)
Age: 68
Discharge: HOME | DRG: 287 | End: 2023-05-02
Payer: MEDICARE

## 2023-05-02 ENCOUNTER — OFFICE VISIT (OUTPATIENT)
Dept: CARDIOLOGY CLINIC | Age: 68
End: 2023-05-02
Payer: MEDICARE

## 2023-05-02 ENCOUNTER — HOSPITAL ENCOUNTER (INPATIENT)
Age: 68
LOS: 1 days | Discharge: HOME | DRG: 287 | End: 2023-05-03
Attending: INTERNAL MEDICINE | Admitting: INTERNAL MEDICINE
Payer: MEDICARE

## 2023-05-02 ENCOUNTER — PREP FOR PROCEDURE (OUTPATIENT)
Dept: CARDIOLOGY CLINIC | Age: 68
End: 2023-05-02

## 2023-05-02 VITALS
HEIGHT: 69 IN | HEART RATE: 58 BPM | DIASTOLIC BLOOD PRESSURE: 85 MMHG | WEIGHT: 176.2 LBS | BODY MASS INDEX: 26.1 KG/M2 | SYSTOLIC BLOOD PRESSURE: 150 MMHG

## 2023-05-02 DIAGNOSIS — R07.9 CHEST PAIN ON EXERTION: ICD-10-CM

## 2023-05-02 DIAGNOSIS — Z85.850 HISTORY OF THYROID CANCER: ICD-10-CM

## 2023-05-02 DIAGNOSIS — I25.5 ISCHEMIC CARDIOMYOPATHY: ICD-10-CM

## 2023-05-02 DIAGNOSIS — I25.10 CORONARY ARTERY DISEASE INVOLVING NATIVE CORONARY ARTERY OF NATIVE HEART WITHOUT ANGINA PECTORIS: ICD-10-CM

## 2023-05-02 DIAGNOSIS — E78.00 PURE HYPERCHOLESTEROLEMIA: ICD-10-CM

## 2023-05-02 DIAGNOSIS — I10 ESSENTIAL HYPERTENSION: ICD-10-CM

## 2023-05-02 DIAGNOSIS — I21.4 NON-ST ELEVATION (NSTEMI) MYOCARDIAL INFARCTION (HCC): ICD-10-CM

## 2023-05-02 DIAGNOSIS — M17.12 PRIMARY OSTEOARTHRITIS OF LEFT KNEE: ICD-10-CM

## 2023-05-02 DIAGNOSIS — I50.32 CHRONIC DIASTOLIC CHF (CONGESTIVE HEART FAILURE) (HCC): ICD-10-CM

## 2023-05-02 DIAGNOSIS — Z95.820 S/P ANGIOPLASTY WITH STENT: ICD-10-CM

## 2023-05-02 DIAGNOSIS — R94.39 ABNORMAL NUCLEAR STRESS TEST: ICD-10-CM

## 2023-05-02 DIAGNOSIS — I95.1 ORTHOSTATIC HYPOTENSION: ICD-10-CM

## 2023-05-02 DIAGNOSIS — R06.02 SOB (SHORTNESS OF BREATH) ON EXERTION: ICD-10-CM

## 2023-05-02 DIAGNOSIS — R94.31 ABNORMAL EKG: ICD-10-CM

## 2023-05-02 DIAGNOSIS — H92.22 OTORRHAGIA OF LEFT EAR: ICD-10-CM

## 2023-05-02 DIAGNOSIS — I25.10 CORONARY ARTERY DISEASE INVOLVING NATIVE CORONARY ARTERY OF NATIVE HEART, UNSPECIFIED WHETHER ANGINA PRESENT: ICD-10-CM

## 2023-05-02 DIAGNOSIS — R53.1 LEFT-SIDED WEAKNESS: ICD-10-CM

## 2023-05-02 DIAGNOSIS — R26.81 GAIT INSTABILITY: ICD-10-CM

## 2023-05-02 DIAGNOSIS — R07.9 CHEST PAIN ON EXERTION: Primary | ICD-10-CM

## 2023-05-02 DIAGNOSIS — K76.9 LIVER DISEASE: ICD-10-CM

## 2023-05-02 DIAGNOSIS — R73.01 IMPAIRED FASTING BLOOD SUGAR: ICD-10-CM

## 2023-05-02 DIAGNOSIS — R94.39 ABNORMAL NUCLEAR STRESS TEST: Primary | ICD-10-CM

## 2023-05-02 LAB
ANION GAP SERPL CALC-SCNC: 9 MEQ/L (ref 8–16)
BUN SERPL-MCNC: 15 MG/DL (ref 7–22)
CALCIUM SERPL-MCNC: 9.2 MG/DL (ref 8.5–10.5)
CHLORIDE SERPL-SCNC: 104 MEQ/L (ref 98–111)
CO2 SERPL-SCNC: 26 MEQ/L (ref 23–33)
CREAT SERPL-MCNC: 1.3 MG/DL (ref 0.4–1.2)
GFR SERPL CREATININE-BSD FRML MDRD: 60 ML/MIN/1.73M2
GLUCOSE SERPL-MCNC: 102 MG/DL (ref 70–108)
POTASSIUM SERPL-SCNC: 4.1 MEQ/L (ref 3.5–5.2)
SODIUM SERPL-SCNC: 139 MEQ/L (ref 135–145)

## 2023-05-02 PROCEDURE — 3078F DIAST BP <80 MM HG: CPT | Performed by: INTERNAL MEDICINE

## 2023-05-02 PROCEDURE — 1036F TOBACCO NON-USER: CPT | Performed by: INTERNAL MEDICINE

## 2023-05-02 PROCEDURE — 1123F ACP DISCUSS/DSCN MKR DOCD: CPT | Performed by: INTERNAL MEDICINE

## 2023-05-02 PROCEDURE — 1200000003 HC TELEMETRY R&B

## 2023-05-02 PROCEDURE — 99214 OFFICE O/P EST MOD 30 MIN: CPT | Performed by: INTERNAL MEDICINE

## 2023-05-02 PROCEDURE — G8417 CALC BMI ABV UP PARAM F/U: HCPCS | Performed by: INTERNAL MEDICINE

## 2023-05-02 PROCEDURE — 36415 COLL VENOUS BLD VENIPUNCTURE: CPT

## 2023-05-02 PROCEDURE — 80048 BASIC METABOLIC PNL TOTAL CA: CPT

## 2023-05-02 PROCEDURE — G8427 DOCREV CUR MEDS BY ELIG CLIN: HCPCS | Performed by: INTERNAL MEDICINE

## 2023-05-02 PROCEDURE — 3017F COLORECTAL CA SCREEN DOC REV: CPT | Performed by: INTERNAL MEDICINE

## 2023-05-02 PROCEDURE — 3074F SYST BP LT 130 MM HG: CPT | Performed by: INTERNAL MEDICINE

## 2023-05-02 RX ORDER — SODIUM CHLORIDE 0.9 % (FLUSH) 0.9 %
5-40 SYRINGE (ML) INJECTION PRN
OUTPATIENT
Start: 2023-05-02

## 2023-05-02 RX ORDER — LISINOPRIL 5 MG/1
5 TABLET ORAL DAILY
Status: DISCONTINUED | OUTPATIENT
Start: 2023-05-03 | End: 2023-05-03 | Stop reason: HOSPADM

## 2023-05-02 RX ORDER — ASPIRIN 81 MG/1
81 TABLET, CHEWABLE ORAL DAILY
Status: DISCONTINUED | OUTPATIENT
Start: 2023-05-03 | End: 2023-05-03 | Stop reason: HOSPADM

## 2023-05-02 RX ORDER — SODIUM CHLORIDE 0.9 % (FLUSH) 0.9 %
5-40 SYRINGE (ML) INJECTION EVERY 12 HOURS SCHEDULED
OUTPATIENT
Start: 2023-05-02

## 2023-05-02 RX ORDER — SODIUM CHLORIDE 9 MG/ML
INJECTION, SOLUTION INTRAVENOUS CONTINUOUS
Status: DISCONTINUED | OUTPATIENT
Start: 2023-05-02 | End: 2023-05-03 | Stop reason: HOSPADM

## 2023-05-02 RX ORDER — CLOPIDOGREL BISULFATE 75 MG/1
75 TABLET ORAL DAILY
Status: DISCONTINUED | OUTPATIENT
Start: 2023-05-03 | End: 2023-05-03

## 2023-05-02 RX ORDER — SODIUM CHLORIDE 9 MG/ML
INJECTION, SOLUTION INTRAVENOUS PRN
OUTPATIENT
Start: 2023-05-02

## 2023-05-02 RX ORDER — LEVOTHYROXINE SODIUM 0.12 MG/1
125 TABLET ORAL DAILY
Status: DISCONTINUED | OUTPATIENT
Start: 2023-05-03 | End: 2023-05-03

## 2023-05-02 RX ORDER — DIPHENHYDRAMINE HYDROCHLORIDE 50 MG/ML
50 INJECTION INTRAMUSCULAR; INTRAVENOUS ONCE
OUTPATIENT
Start: 2023-05-02 | End: 2023-05-02

## 2023-05-02 RX ORDER — BLOOD PRESSURE TEST KIT
1 KIT MISCELLANEOUS AS NEEDED
Qty: 1 KIT | Refills: 0 | Status: SHIPPED | OUTPATIENT
Start: 2023-05-02

## 2023-05-02 RX ORDER — RANOLAZINE 500 MG/1
500 TABLET, EXTENDED RELEASE ORAL 2 TIMES DAILY
Status: DISCONTINUED | OUTPATIENT
Start: 2023-05-02 | End: 2023-05-03 | Stop reason: HOSPADM

## 2023-05-02 RX ORDER — NITROGLYCERIN 0.4 MG/1
0.4 TABLET SUBLINGUAL EVERY 5 MIN PRN
OUTPATIENT
Start: 2023-05-02

## 2023-05-02 RX ORDER — ATORVASTATIN CALCIUM 40 MG/1
40 TABLET, FILM COATED ORAL DAILY
Status: DISCONTINUED | OUTPATIENT
Start: 2023-05-03 | End: 2023-05-03 | Stop reason: HOSPADM

## 2023-05-02 RX ORDER — ASPIRIN 325 MG
325 TABLET ORAL ONCE
OUTPATIENT
Start: 2023-05-02 | End: 2023-05-02

## 2023-05-02 NOTE — PROGRESS NOTES
compliant with medical treatment. Need better control  Cont lisinopril 5 mg po qd  Hx of dizziness   Hx Low Normal BP- normalized after hydration 2018  Advised hydration     Hyperlipidemia: on statins, followed periodically. Patient need periodic lipid and liver profile. Lab prior to next visit    patient is advised to exercise 30 min s a day three times a week and about weight loss ,balance diet and     More fruits and vegetables . Discussed use, benefit, and side effects of prescribed medications. All patient questions answered. Pt voiced understanding. Instructed to continue current medications, diet and exercise. Continue risk factor modification and medical management. Patient agreed with treatment plan. Follow up as directed.        RTC in 3 weeks    [facility]

## 2023-05-02 NOTE — TELEPHONE ENCOUNTER
PROCEDURE: CARDIAC CATH     DATE OF SERVICE: 05/03/2023    SERVICE LOCATION: [facility]    CPT CODE: 94165    PHYSICIAN: SHARAD    DATE PRIOR AUTH SUBMITTED: 05/02/2023    STATUS: approved    CASE NUMBER: [id number]    AUTH NUMBER: [id number]    VALID:  05/02/2023-06/16/2023

## 2023-05-03 ENCOUNTER — APPOINTMENT (OUTPATIENT)
Dept: INTERVENTIONAL RADIOLOGY/VASCULAR | Age: 68
DRG: 287 | End: 2023-05-03
Attending: INTERNAL MEDICINE
Payer: MEDICARE

## 2023-05-03 ENCOUNTER — APPOINTMENT (OUTPATIENT)
Dept: CARDIAC CATH/INVASIVE PROCEDURES | Age: 68
DRG: 287 | End: 2023-05-03
Attending: INTERNAL MEDICINE
Payer: MEDICARE

## 2023-05-03 ENCOUNTER — APPOINTMENT (OUTPATIENT)
Dept: CT IMAGING | Age: 68
DRG: 287 | End: 2023-05-03
Attending: INTERNAL MEDICINE
Payer: MEDICARE

## 2023-05-03 VITALS
BODY MASS INDEX: 25.48 KG/M2 | TEMPERATURE: 98 F | RESPIRATION RATE: 18 BRPM | OXYGEN SATURATION: 95 % | DIASTOLIC BLOOD PRESSURE: 83 MMHG | SYSTOLIC BLOOD PRESSURE: 136 MMHG | HEIGHT: 69 IN | HEART RATE: 58 BPM | WEIGHT: 172 LBS

## 2023-05-03 PROBLEM — Z98.890 S/P CARDIAC CATH: Status: ACTIVE | Noted: 2023-05-03

## 2023-05-03 LAB
ANION GAP SERPL CALC-SCNC: 13 MEQ/L (ref 8–16)
BUN SERPL-MCNC: 19 MG/DL (ref 7–22)
CALCIUM SERPL-MCNC: 8.7 MG/DL (ref 8.5–10.5)
CHLORIDE SERPL-SCNC: 108 MEQ/L (ref 98–111)
CO2 SERPL-SCNC: 23 MEQ/L (ref 23–33)
CREAT SERPL-MCNC: 1.3 MG/DL (ref 0.4–1.2)
DEPRECATED RDW RBC AUTO: 50 FL (ref 35–45)
ERYTHROCYTE [DISTWIDTH] IN BLOOD BY AUTOMATED COUNT: 15.7 % (ref 11.5–14.5)
GFR SERPL CREATININE-BSD FRML MDRD: 60 ML/MIN/1.73M2
GLUCOSE SERPL-MCNC: 98 MG/DL (ref 70–108)
HCT VFR BLD AUTO: 40.9 % (ref 42–52)
HGB BLD-MCNC: 13.2 GM/DL (ref 14–18)
MCH RBC QN AUTO: 28.1 PG (ref 26–33)
MCHC RBC AUTO-ENTMCNC: 32.3 GM/DL (ref 32.2–35.5)
MCV RBC AUTO: 87 FL (ref 80–94)
PLATELET # BLD AUTO: 194 THOU/MM3 (ref 130–400)
PMV BLD AUTO: 10.9 FL (ref 9.4–12.4)
POTASSIUM SERPL-SCNC: 4.2 MEQ/L (ref 3.5–5.2)
RBC # BLD AUTO: 4.7 MILL/MM3 (ref 4.7–6.1)
SODIUM SERPL-SCNC: 144 MEQ/L (ref 135–145)
WBC # BLD AUTO: 4.2 THOU/MM3 (ref 4.8–10.8)

## 2023-05-03 PROCEDURE — 4A023N7 MEASUREMENT OF CARDIAC SAMPLING AND PRESSURE, LEFT HEART, PERCUTANEOUS APPROACH: ICD-10-PCS | Performed by: INTERNAL MEDICINE

## 2023-05-03 PROCEDURE — 6360000002 HC RX W HCPCS

## 2023-05-03 PROCEDURE — 6360000004 HC RX CONTRAST MEDICATION: Performed by: INTERNAL MEDICINE

## 2023-05-03 PROCEDURE — 71250 CT THORAX DX C-: CPT

## 2023-05-03 PROCEDURE — 99223 1ST HOSP IP/OBS HIGH 75: CPT | Performed by: THORACIC SURGERY (CARDIOTHORACIC VASCULAR SURGERY)

## 2023-05-03 PROCEDURE — B2111ZZ FLUOROSCOPY OF MULTIPLE CORONARY ARTERIES USING LOW OSMOLAR CONTRAST: ICD-10-PCS | Performed by: INTERNAL MEDICINE

## 2023-05-03 PROCEDURE — 2580000003 HC RX 258: Performed by: INTERNAL MEDICINE

## 2023-05-03 PROCEDURE — 6370000000 HC RX 637 (ALT 250 FOR IP): Performed by: INTERNAL MEDICINE

## 2023-05-03 PROCEDURE — 74174 CTA ABD&PLVS W/CONTRAST: CPT

## 2023-05-03 PROCEDURE — 93880 EXTRACRANIAL BILAT STUDY: CPT

## 2023-05-03 PROCEDURE — 2500000003 HC RX 250 WO HCPCS

## 2023-05-03 PROCEDURE — 6360000004 HC RX CONTRAST MEDICATION: Performed by: THORACIC SURGERY (CARDIOTHORACIC VASCULAR SURGERY)

## 2023-05-03 PROCEDURE — 93458 L HRT ARTERY/VENTRICLE ANGIO: CPT

## 2023-05-03 PROCEDURE — 93971 EXTREMITY STUDY: CPT

## 2023-05-03 PROCEDURE — B2131ZZ FLUOROSCOPY OF MULTIPLE CORONARY ARTERY BYPASS GRAFTS USING LOW OSMOLAR CONTRAST: ICD-10-PCS | Performed by: INTERNAL MEDICINE

## 2023-05-03 PROCEDURE — 85027 COMPLETE CBC AUTOMATED: CPT

## 2023-05-03 PROCEDURE — 36415 COLL VENOUS BLD VENIPUNCTURE: CPT

## 2023-05-03 PROCEDURE — 80048 BASIC METABOLIC PNL TOTAL CA: CPT

## 2023-05-03 RX ORDER — SODIUM CHLORIDE 0.9 % (FLUSH) 0.9 %
5-40 SYRINGE (ML) INJECTION PRN
Status: CANCELLED | OUTPATIENT
Start: 2023-05-03

## 2023-05-03 RX ORDER — SODIUM CHLORIDE 0.9 % (FLUSH) 0.9 %
5-40 SYRINGE (ML) INJECTION EVERY 12 HOURS SCHEDULED
Status: CANCELLED | OUTPATIENT
Start: 2023-05-03

## 2023-05-03 RX ORDER — SODIUM CHLORIDE 9 MG/ML
INJECTION, SOLUTION INTRAVENOUS PRN
Status: CANCELLED | OUTPATIENT
Start: 2023-05-03

## 2023-05-03 RX ORDER — ONDANSETRON 2 MG/ML
4 INJECTION INTRAMUSCULAR; INTRAVENOUS EVERY 6 HOURS PRN
Status: CANCELLED | OUTPATIENT
Start: 2023-05-03

## 2023-05-03 RX ORDER — LEVOTHYROXINE SODIUM 137 UG/1
137 TABLET ORAL DAILY
Status: DISCONTINUED | OUTPATIENT
Start: 2023-05-03 | End: 2023-05-03 | Stop reason: HOSPADM

## 2023-05-03 RX ORDER — ACETAMINOPHEN 325 MG/1
650 TABLET ORAL EVERY 4 HOURS PRN
Status: CANCELLED | OUTPATIENT
Start: 2023-05-03

## 2023-05-03 RX ADMIN — SODIUM CHLORIDE: 9 INJECTION, SOLUTION INTRAVENOUS at 00:41

## 2023-05-03 RX ADMIN — IOPAMIDOL 160 ML: 755 INJECTION, SOLUTION INTRAVENOUS at 09:14

## 2023-05-03 RX ADMIN — ASPIRIN 81 MG 81 MG: 81 TABLET ORAL at 09:56

## 2023-05-03 RX ADMIN — IOPAMIDOL 80 ML: 755 INJECTION, SOLUTION INTRAVENOUS at 14:07

## 2023-05-03 RX ADMIN — LISINOPRIL 5 MG: 5 TABLET ORAL at 09:56

## 2023-05-03 RX ADMIN — RANOLAZINE 500 MG: 500 TABLET, EXTENDED RELEASE ORAL at 09:56

## 2023-05-03 RX ADMIN — LEVOTHYROXINE SODIUM 137 MCG: 0.14 TABLET ORAL at 09:56

## 2023-05-03 RX ADMIN — METOPROLOL SUCCINATE 75 MG: 50 TABLET, EXTENDED RELEASE ORAL at 09:56

## 2023-05-03 RX ADMIN — ATORVASTATIN CALCIUM 40 MG: 40 TABLET, FILM COATED ORAL at 09:56

## 2023-05-03 RX ADMIN — CLOPIDOGREL BISULFATE 75 MG: 75 TABLET ORAL at 09:56

## 2023-05-03 ASSESSMENT — ENCOUNTER SYMPTOMS
EYE DISCHARGE: 0
COLOR CHANGE: 0
ABDOMINAL PAIN: 0
CHEST TIGHTNESS: 1

## 2023-05-03 NOTE — CARE COORDINATION
Case Management Assessment  Initial Evaluation    Date/Time of Evaluation: 5/3/2023 2:36 PM  Assessment Completed by: Lupe Navarro RN    If patient is discharged prior to next notation, then this note serves as note for discharge by case management. Patient Name: Marybel Burnett                   YOB: 1955  Diagnosis: Chest pain [R07.9]                   Date / Time: 5/2/2023  8:24 PM  Location: [address] [place] [address]     Patient Admission Status: Inpatient   Readmission Risk Low 0-14, Mod 15-19), High > 20: Readmission Risk Score: 5.3    Current PCP: KATIE Arzola CNP  PCP verified by CM? Yes    Chart Reviewed: Yes      History Provided by: Patient  Patient Orientation: Alert and Oriented    Patient Cognition: Alert    Hospitalization in the last 30 days (Readmission):  No    If yes, Readmission Assessment in CM Navigator will be completed. Advance Directives:      Code Status: Prior   Patient's Primary Decision Maker is: Legal Next of Kin      Discharge Planning:    Patient lives with: Spouse/Significant Other Type of Home: House  Primary Care Giver: Self  Patient Support Systems include: Family Members, Children   Current Financial resources: Medicare  Current community resources: None  Current services prior to admission: Durable Medical Equipment            Current DME: Other (Comment) (access to rollator, cane, hospital bed, wheelchair)            Type of Home Care services:  None    ADLS  Prior functional level: Independent in ADLs/IADLs  Current functional level: Independent in ADLs/IADLs    Family can provide assistance at DC: Yes  Would you like Case Management to discuss the discharge plan with any other family members/significant others, and if so, who?  Yes (wife)  Plans to Return to Present Housing: Yes  Other Identified Issues/Barriers to RETURNING to current housing: no  Potential Assistance needed at discharge: N/A            Potential DME:    Patient expects to discharge to:

## 2023-05-03 NOTE — CONSULTS
Cardiothoracic Surgery History & Physical       Patient:  Trae Mast  YOB: 1955    MRN: [id number]     Acct: [de-identified]    PCP: KATIE Clifford CNP    Date of Admission: 5/2/2023    Chief Complaint:  Chest pain    History Of Present Illness:  76 y.o. male, s/p PCI to left main and circumflex coronary arteries in 2018 at [address] [place] [address], for myocardial infarction following thyroidectomy, presents with stable angina consisting of exercise- and cold-exposure-induced non-radiating left parasternal chest pain, relieved by rest, along with chronic progressive fatigue. Denies orthopnea or presyncope. Underwent LHC today showing open stents, but LAD and RCA , with distal vessels filling from posterior collaterals. Pain comfortable, pain free. On chronic clopidogrel. Past Medical History:          Diagnosis Date    Arthritis     Chronic diastolic CHF (congestive heart failure) (HCC)     Coronary artery disease involving native coronary artery of native heart without angina pectoris 7/10/2018    Hyperlipidemia     Hypertension     Liver disease     Hep A 18 years ago    Malignant neoplasm of thyroid gland ([facility] [address] 3/15/2019       Past Surgical History:          Procedure Laterality Date    CARPAL TUNNEL RELEASE      CHOLECYSTECTOMY      HERNIA REPAIR      THYROIDECTOMY      TONSILLECTOMY AND ADENOIDECTOMY      TOTAL KNEE ARTHROPLASTY Left        Medications Prior to Admission:      Prior to Admission medications    Medication Sig Start Date End Date Taking?  Authorizing Provider   Blood Pressure KIT 1 kit by Does not apply route as needed (as needed) 5/2/23   Joanie Dee MD   ranolazine (RANEXA) 500 MG extended release tablet TAKE 1 TABLET BY MOUTH TWICE DAILY  Patient taking differently: 2 times daily Patient states he takes BID 4/14/23   KATIE Herrera CNP   metoprolol succinate (TOPROL XL) 50 MG extended release tablet TAKE 1 TABLET BY MOUTH DAILY 3/28/23   KATIE Herrera

## 2023-05-03 NOTE — PROGRESS NOTES
2000 Patient arrived to unit, alert and oriented x 4, vitals stable. Oriented patient to unit, educated on floor procedures, use of call light and room phone. Notified physician of arrival, verbal orders received. Patient safe, phone and call bell in reach, monitoring.

## 2023-05-03 NOTE — H&P
[facility]  Sedation/Analgesia History & Physical    Pt Name: Enmanuel Smith  MRN: [id number]  YOB: 1955  Provider Performing Procedure: Live Schmidt MD  Primary Care Physician: KATIE Marin - MAX    PRE-PROCEDURE   DNR-CCA/DNR-CC []Yes [x]No  Brief History/Pre-Procedure Diagnosis:   Chest pain and sob on exertion CCS class III and abn nuc stress  Need cardiac cath and prehydration  The risk and benefit of left heart cath has been explain in detail including but not limited to  Bleeding including retroperitoneal bleed 1%, infection, MI, CVA, JOHANA, Limb loss, dissection, allergic reaction,death  Each of them 1 in 2000 range. The alternative managment has been explained. Patient expressed understanding of the risk and benefit and of the alternative managment well. Patient wanted and agreed to proceed with left heart cath. Hence we will schedule him for [facility]               MEDICAL HISTORY  []CAD/Valve  []Liver Disease  []Lung Disease []Diabetes  []Hypertension []Renal Disease  []Additional information:       has a past medical history of Arthritis, Chronic diastolic CHF (congestive heart failure) ([facility] [address] Coronary artery disease involving native coronary artery of native heart without angina pectoris, Hyperlipidemia, Hypertension, Liver disease, and Malignant neoplasm of thyroid gland ([facility] [address] SURGICAL HISTORY   has a past surgical history that includes Carpal tunnel release; Tonsillectomy and adenoidectomy; hernia repair; Cholecystectomy; Total knee arthroplasty (Left); and Thyroidectomy.   Additional information:       ALLERGIES   Allergies as of 05/02/2023 - Fully Reviewed 05/02/2023   Allergen Reaction Noted    Pepto-bismol [bismuth subsalicylate] Nausea And Vomiting 04/14/2015     Additional information:       MEDICATIONS   Coumadin Use Last 5 Days [x]No []Yes  Antiplatelet drug therapy use last 5 days  []No [x]Yes  Other anticoagulant use last 5 days  [x]No []Yes    Current

## 2023-05-04 ENCOUNTER — TELEPHONE (OUTPATIENT)
Dept: CARDIOTHORACIC SURGERY | Age: 68
End: 2023-05-04

## 2023-05-04 ENCOUNTER — TELEPHONE (OUTPATIENT)
Dept: FAMILY MEDICINE CLINIC | Age: 68
End: 2023-05-04

## 2023-05-04 NOTE — TELEPHONE ENCOUNTER
Care Transitions Initial Follow Up Call    Outreach made within 2 business days of discharge: Yes    Patient: Norm Goodson Patient : 1955   MRN: [id number]  Reason for Admission: There are no discharge diagnoses documented for the most recent discharge. Discharge Date: 5/3/23       Spoke with: Patient and Zachary Temple wife    Discharge department/facility: [address] [place] [address] Interactive Patient Contact:  Was patient able to fill all prescriptions: N/A  Was patient instructed to bring all medications to the follow-up visit: Yes  Is patient taking all medications as directed in the discharge summary? Yes  Does patient understand their discharge instructions: Yes  Does patient have questions or concerns that need addressed prior to 7-14 day follow up office visit: no    Scheduled appointment with PCP within 7-14 days--Declines need for follow-up with PCP at this time.   Will discuss if needs clearance from PCP prior to CABG x 3. They have a call into [facility] already    Follow Up  Future Appointments   Date Time Provider Jose Chou   5/10/2023  8:00 AM [facility] [address] [facility]   2023 10:00 AM KATIE Castillo   2023 10:15 AM Sharla Amor MD N SRPX Heart [facility] Nikole Jimenez LPN

## 2023-05-04 NOTE — TELEPHONE ENCOUNTER
Care Transitions Initial Follow Up Call    Outreach made within 2 business days of discharge: Yes    Patient: Linus Corrales Patient : 1955   MRN: [id number]  Reason for Admission: There are no discharge diagnoses documented for the most recent discharge. Discharge Date: 5/3/23       Spoke with: attempted to contact pt, left vm.     Discharge department/facility: [facility]    TCM Interactive Patient Contact:      Scheduled appointment with PCP within 7-14 days    Follow Up  Future Appointments   Date Time Provider Jose Chou   2023 10:00 AM [facility] [address]   2023 10:15 AM Keely Peralta MD [facility] [address], [address] ([address] [place] [address])

## 2023-05-05 ENCOUNTER — PREP FOR PROCEDURE (OUTPATIENT)
Dept: CARDIOTHORACIC SURGERY | Age: 68
End: 2023-05-05

## 2023-05-05 DIAGNOSIS — I25.10 CAD, MULTIPLE VESSEL: Primary | ICD-10-CM

## 2023-05-05 PROCEDURE — APPSS15 APP SPLIT SHARED TIME 0-15 MINUTES: Performed by: PHYSICIAN ASSISTANT

## 2023-05-05 RX ORDER — SODIUM CHLORIDE 9 MG/ML
INJECTION, SOLUTION INTRAVENOUS PRN
Status: CANCELLED | OUTPATIENT
Start: 2023-05-05

## 2023-05-05 RX ORDER — SODIUM CHLORIDE 9 MG/ML
INJECTION, SOLUTION INTRAVENOUS CONTINUOUS
Status: CANCELLED | OUTPATIENT
Start: 2023-05-05

## 2023-05-05 RX ORDER — SODIUM CHLORIDE 0.9 % (FLUSH) 0.9 %
5-40 SYRINGE (ML) INJECTION EVERY 12 HOURS SCHEDULED
Status: CANCELLED | OUTPATIENT
Start: 2023-05-05

## 2023-05-05 RX ORDER — SODIUM CHLORIDE 0.9 % (FLUSH) 0.9 %
5-40 SYRINGE (ML) INJECTION PRN
Status: CANCELLED | OUTPATIENT
Start: 2023-05-05

## 2023-05-08 ENCOUNTER — TELEPHONE (OUTPATIENT)
Dept: CARDIOLOGY CLINIC | Age: 68
End: 2023-05-08

## 2023-05-08 ENCOUNTER — TELEPHONE (OUTPATIENT)
Dept: FAMILY MEDICINE CLINIC | Age: 68
End: 2023-05-08

## 2023-05-08 NOTE — TELEPHONE ENCOUNTER
Pt called saying that he needs a PA for his BP cuff   Called [phone] to start a PA   Spent 20 mins on the phone trying to get the PA completed, they kept asking for a date of service and where this was going to be done at? LM with [facility] home medical equipment to see if he can bring it there if they are able to help him with this?     If they can not help he will have to pay out of pocket

## 2023-05-08 NOTE — TELEPHONE ENCOUNTER
Spoke with home medical equipment and they states this is not a covered medical equipment for this pt   I called the insurance and they will only cover this if he is in end stage renal disease     Called Keshia at  patient assistance [phone] to see if she knows of any program that will help with a BP cuff     Pt will need notified

## 2023-05-08 NOTE — TELEPHONE ENCOUNTER
----- Message from Melisa Jama sent at 5/8/2023 12:36 PM EDT -----  Subject: Hospital Follow Up    QUESTIONS  What hospital was the Patient Discharged from? [address] [address]  Date of Discharge? 2023-05-03  Discharge Location? Home  Reason for hospitalization as patient stated? Chest Pain  What question does the patient have, if applicable? Patient called stating   he would not be in town for appointment [email or web address]  ---------------------------------------------------------------------------  --------------  [address]  What is the best way for the office to contact you? OK to leave message on   voicemail  Preferred Call Back Phone Number? [phone]  ---------------------------------------------------------------------------  --------------  SCRIPT ANSWERS  Relationship to Patient? Self  \"Have your symptoms changed? \": (If routine visit such as AWV, Physical or   PAP then answer no)?  No

## 2023-05-08 NOTE — TELEPHONE ENCOUNTER
Called and spoke with the patient about rescheduling his hospital follow up, he states that she will be having a CABG next week and does not want to reschedule at this time.

## 2023-05-09 NOTE — PROGRESS NOTES
PAT Appointment reminder call given  Date: Wed 5/10/23  Arrival time: 0800  and location; [facility]   Bring Drivers license and insurance  Bring Medications in original bottles  If possible bring caregiver for appointment  Take am medications with water unless you are holding any for surgery  Appointment may last 2 hours

## 2023-05-10 ENCOUNTER — HOSPITAL ENCOUNTER (OUTPATIENT)
Dept: GENERAL RADIOLOGY | Age: 68
Discharge: HOME | End: 2023-05-10
Payer: MEDICARE

## 2023-05-10 ENCOUNTER — HOSPITAL ENCOUNTER (OUTPATIENT)
Dept: PREADMISSION TESTING | Age: 68
Discharge: HOME | DRG: 236 | End: 2023-05-10
Payer: MEDICARE

## 2023-05-10 VITALS
OXYGEN SATURATION: 99 % | RESPIRATION RATE: 16 BRPM | SYSTOLIC BLOOD PRESSURE: 151 MMHG | DIASTOLIC BLOOD PRESSURE: 88 MMHG | WEIGHT: 173.94 LBS | TEMPERATURE: 97.5 F | HEIGHT: 68 IN | HEART RATE: 57 BPM | BODY MASS INDEX: 26.36 KG/M2

## 2023-05-10 DIAGNOSIS — I25.10 CAD, MULTIPLE VESSEL: ICD-10-CM

## 2023-05-10 LAB
ABO: NORMAL
ANION GAP SERPL CALC-SCNC: 7 MEQ/L (ref 8–16)
ANTIBODY SCREEN: NORMAL
BACTERIA: ABNORMAL
BILIRUB UR QL STRIP: NEGATIVE
BUN SERPL-MCNC: 24 MG/DL (ref 7–22)
CALCIUM SERPL-MCNC: 9 MG/DL (ref 8.5–10.5)
CASTS #/AREA URNS LPF: ABNORMAL /LPF
CASTS #/AREA URNS LPF: ABNORMAL /LPF
CHARACTER UR: CLEAR
CHARCOAL URNS QL MICRO: ABNORMAL
CHLORIDE SERPL-SCNC: 106 MEQ/L (ref 98–111)
CO2 SERPL-SCNC: 28 MEQ/L (ref 23–33)
COLOR UR: YELLOW
CREAT SERPL-MCNC: 1.2 MG/DL (ref 0.4–1.2)
CRYSTALS URNS QL MICRO: ABNORMAL
DEPRECATED MEAN GLUCOSE BLD GHB EST-ACNC: 120 MG/DL (ref 70–126)
DEPRECATED RDW RBC AUTO: 49.8 FL (ref 35–45)
EPITHELIAL CELLS, UA: ABNORMAL /HPF
ERYTHROCYTE [DISTWIDTH] IN BLOOD BY AUTOMATED COUNT: 15.8 % (ref 11.5–14.5)
GFR SERPL CREATININE-BSD FRML MDRD: > 60 ML/MIN/1.73M2
GLUCOSE SERPL-MCNC: 101 MG/DL (ref 70–108)
GLUCOSE UR QL STRIP.AUTO: NEGATIVE MG/DL
HBA1C MFR BLD HPLC: 6 % (ref 4.4–6.4)
HCT VFR BLD AUTO: 44.3 % (ref 42–52)
HGB BLD-MCNC: 14.2 GM/DL (ref 14–18)
HGB UR QL STRIP.AUTO: ABNORMAL
INR PPP: 1.07 (ref 0.85–1.13)
KETONES UR QL STRIP.AUTO: NEGATIVE
LEUKOCYTE ESTERASE UR QL STRIP.AUTO: NEGATIVE
MCH RBC QN AUTO: 27.9 PG (ref 26–33)
MCHC RBC AUTO-ENTMCNC: 32.1 GM/DL (ref 32.2–35.5)
MCV RBC AUTO: 87 FL (ref 80–94)
MRSA DNA SPEC QL NAA+PROBE: NEGATIVE
NITRITE UR QL STRIP.AUTO: NEGATIVE
PH UR STRIP.AUTO: 5.5 [PH] (ref 5–9)
PLATELET # BLD AUTO: 204 THOU/MM3 (ref 130–400)
PMV BLD AUTO: 10.6 FL (ref 9.4–12.4)
POTASSIUM SERPL-SCNC: 4.8 MEQ/L (ref 3.5–5.2)
PROT UR STRIP.AUTO-MCNC: NEGATIVE MG/DL
RBC # BLD AUTO: 5.09 MILL/MM3 (ref 4.7–6.1)
RBC #/AREA URNS HPF: ABNORMAL /HPF
RENAL EPI CELLS #/AREA URNS HPF: ABNORMAL /[HPF]
RH FACTOR: NORMAL
S AUREUS DNA SPEC QL NAA+PROBE: NEGATIVE
SODIUM SERPL-SCNC: 141 MEQ/L (ref 135–145)
SP GR UR REFRACT.AUTO: 1.02 (ref 1–1.03)
UROBILINOGEN UR QL STRIP.AUTO: 0.2 EU/DL (ref 0–1)
WBC # BLD AUTO: 4.6 THOU/MM3 (ref 4.8–10.8)
WBC #/AREA URNS HPF: ABNORMAL /HPF
YEAST LIKE FUNGI URNS QL MICRO: ABNORMAL

## 2023-05-10 PROCEDURE — 87640 STAPH A DNA AMP PROBE: CPT

## 2023-05-10 PROCEDURE — 86901 BLOOD TYPING SEROLOGIC RH(D): CPT

## 2023-05-10 PROCEDURE — 71046 X-RAY EXAM CHEST 2 VIEWS: CPT

## 2023-05-10 PROCEDURE — 93010 ELECTROCARDIOGRAM REPORT: CPT | Performed by: INTERNAL MEDICINE

## 2023-05-10 PROCEDURE — 81001 URINALYSIS AUTO W/SCOPE: CPT

## 2023-05-10 PROCEDURE — 36415 COLL VENOUS BLD VENIPUNCTURE: CPT

## 2023-05-10 PROCEDURE — 87641 MR-STAPH DNA AMP PROBE: CPT

## 2023-05-10 PROCEDURE — 86900 BLOOD TYPING SEROLOGIC ABO: CPT

## 2023-05-10 PROCEDURE — 85610 PROTHROMBIN TIME: CPT

## 2023-05-10 PROCEDURE — 80048 BASIC METABOLIC PNL TOTAL CA: CPT

## 2023-05-10 PROCEDURE — 83036 HEMOGLOBIN GLYCOSYLATED A1C: CPT

## 2023-05-10 PROCEDURE — 86850 RBC ANTIBODY SCREEN: CPT

## 2023-05-10 PROCEDURE — 85027 COMPLETE CBC AUTOMATED: CPT

## 2023-05-10 PROCEDURE — 86923 COMPATIBILITY TEST ELECTRIC: CPT

## 2023-05-10 PROCEDURE — 93005 ELECTROCARDIOGRAM TRACING: CPT

## 2023-05-10 RX ORDER — BLOOD PRESSURE TEST KIT
1 KIT MISCELLANEOUS AS NEEDED
Qty: 1 KIT | Refills: 0 | Status: ON HOLD | OUTPATIENT
Start: 2023-05-10

## 2023-05-10 RX ORDER — CLOPIDOGREL BISULFATE 75 MG/1
75 TABLET ORAL DAILY
COMMUNITY

## 2023-05-10 RX ORDER — NITROGLYCERIN 0.4 MG/1
1 TABLET SUBLINGUAL EVERY 5 MIN PRN
COMMUNITY
Start: 2018-06-04 | End: 2023-05-10

## 2023-05-10 NOTE — PROGRESS NOTES
Open Heart Pre-op Surgery Showering Instructions     To keep your skin as clean as possible and to help prevent infection, please follow these instructions: 1. Shower with a cleanser containing Chlorhexidine Gluconate (CHG) the morning of your surgery. *Note* when using CHG cleanser do not use it on mucous membranes, such as your genital area. Do not get the soap in your eyes. CHG is absorbed by cotton washcloths and may cause discoloration to wash cloth. 2.In the shower, wet skin and wash your body and your hair with CHG cleanser. 3.Pay special attention to area(s) where you will have surgery. (ie Chest, both arms and both legs)  4. Ask someone for help if you are unable to wash certain areas of your body. 5..Rinse well. 6.Gently dry with a clean cloth. 7.When you arrive the day of surgery ,part of your prep for surgery will include clipping of the hair on the front of your body and showering after. PLEASE REMEMBER:    1. DO NOT SHAVE ANY BODY PARTS from neck down ( including your legs or underarms.) for at least 2 days before surgery. Shaving can increase your risk of infection. 2. AFTER YOUR SHOWER, do not use any powder, deodorant, perfumes, or lotions prior to surgery. 3. WEAR FRESHLY LAUNDERED pajamas to bed that night and sleep on freshly laundered sheets and pillow cases.

## 2023-05-10 NOTE — PROGRESS NOTES
Pt wife was upset that pt had to shave beard off. Informed pt and and wife that we have pt shave off because of moe of infection and because of the ET tube pt will have in after surgery. Pt wife stepped out for phone call during visit.

## 2023-05-10 NOTE — PROGRESS NOTES
NPO after midnight  [employer] and drivers license  Wear loose, comfortable clean clothing  Do not bring jewelry or valuables. Bring case for glasses. Do not glue in dentures/partials  You may bring cell phone and     Bring medications in original bottles  Routine preop instructions given for pain, hand hygiene, fall prevention, infection prevention, anesthesia, cough and deep breath, MRSA/MSSA and progressive diet and ambulation  4% Chlorhexidine soap bottle given to patient. Shower and wash hair with chlorhexidine soap morning of surgery  IS instruction given and return demonstration  Open Heart Book reviewed, questions answered and book give to patient  To help prevent bowel problems after surgery we ask that you drink a hot beverage and eat an Activia yogurt with each meal starting 3-4 days before surgery. Viewed open heart video  IS goal was 1000 ml   Follow all instructions give by your physician     needed at discharge  Report to [facility] [place] on 2nd floor  If you would become ill prior to surgery, please call the surgeon  Masks are recommended but not required. You may  a new mask by our .

## 2023-05-10 NOTE — PROGRESS NOTES
Informed pt wife and pt that I forgot to show the cardiac movie. Pt wife states that she didn't want to come back for cardiac movie . Her sister had same surgery and she knows what to expect. .  Pt is ok with not viewing cardiac movie as well.

## 2023-05-10 NOTE — PROGRESS NOTES
Preliminary Discharge Planning Questionnaire  Date of Surgery 5-12-23  Surgeon Dr Elisabet William       Having the proper help and care after surgery is very important to your recovery. Who will be able to help you at home when you are discharged from the hospital?        How many steps to enter your home? 3    Bathroom on first floor? Yes    Bedroom on the first floor? Yes    Do you have an elevated toilet seat to use at home? No    Do you have a walker to use at home? Total Joints - with wheels N/A   Spine - with wheels  N/A     Have you been doing home exercises? no    *You will go home with some outpatient physical therapy, where do you [facility] to go? [facility]    *If needed, what home health agency would you like to use?   [facility]    *Cardiac Rehab plans    [facility]
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
## 2023-05-12 ENCOUNTER — HOSPITAL ENCOUNTER (INPATIENT)
Age: 68
LOS: 5 days | Discharge: HOME | DRG: 236 | End: 2023-05-17
Attending: THORACIC SURGERY (CARDIOTHORACIC VASCULAR SURGERY) | Admitting: THORACIC SURGERY (CARDIOTHORACIC VASCULAR SURGERY)
Payer: MEDICARE

## 2023-05-12 ENCOUNTER — ANESTHESIA (OUTPATIENT)
Dept: OPERATING ROOM | Age: 68
End: 2023-05-12
Payer: MEDICARE

## 2023-05-12 ENCOUNTER — APPOINTMENT (OUTPATIENT)
Dept: GENERAL RADIOLOGY | Age: 68
DRG: 236 | End: 2023-05-12
Attending: THORACIC SURGERY (CARDIOTHORACIC VASCULAR SURGERY)
Payer: MEDICARE

## 2023-05-12 ENCOUNTER — ANESTHESIA EVENT (OUTPATIENT)
Dept: OPERATING ROOM | Age: 68
End: 2023-05-12
Payer: MEDICARE

## 2023-05-12 DIAGNOSIS — I25.10 CAD, MULTIPLE VESSEL: Primary | ICD-10-CM

## 2023-05-12 LAB
ACTIVATED CLOTTING TIME: 128 SECONDS (ref 99–130)
ACTIVATED CLOTTING TIME: 131 SECONDS (ref 99–130)
ACTIVATED CLOTTING TIME: 424 SECONDS (ref 99–130)
ACTIVATED CLOTTING TIME: 573 SECONDS (ref 99–130)
ANION GAP SERPL CALC-SCNC: 12 MEQ/L (ref 8–16)
ARTERIAL PATENCY WRIST A: ABNORMAL
ARTERIAL PATENCY WRIST A: ABNORMAL
BASE EXCESS BLDA CALC-SCNC: -0.1 MMOL/L (ref -2–3)
BASE EXCESS BLDA CALC-SCNC: -0.8 MMOL/L (ref -2.5–2.5)
BASE EXCESS BLDA CALC-SCNC: -1.1 MMOL/L (ref -2.5–2.5)
BASE EXCESS BLDA CALC-SCNC: -1.6 MMOL/L (ref -2.5–2.5)
BASE EXCESS BLDA CALC-SCNC: -3 MMOL/L (ref -2.5–2.5)
BASE EXCESS BLDA CALC-SCNC: 0.1 MMOL/L (ref -2.5–2.5)
BASE EXCESS BLDA CALC-SCNC: 0.3 MMOL/L (ref -2.5–2.5)
BDY SITE: ABNORMAL
BREATHS SETTING VENT: 16 BPM
BUN SERPL-MCNC: 21 MG/DL (ref 7–22)
CA-I BLD ISE-SCNC: 0.93 MMOL/L (ref 1.12–1.32)
CA-I BLD ISE-SCNC: 1.01 MMOL/L (ref 1.12–1.32)
CA-I BLD ISE-SCNC: 1.03 MMOL/L (ref 1.12–1.32)
CA-I BLD ISE-SCNC: 1.13 MMOL/L (ref 1.12–1.32)
CA-I BLD ISE-SCNC: 1.15 MMOL/L (ref 1.12–1.32)
CA-I BLD ISE-SCNC: 1.16 MMOL/L (ref 1.12–1.32)
CA-I BLD ISE-SCNC: 1.17 MMOL/L (ref 1.12–1.32)
CALCIUM SERPL-MCNC: 6.5 MG/DL (ref 8.5–10.5)
CARTRIDGE COLOR: NORMAL
CHLORIDE SERPL-SCNC: 110 MEQ/L (ref 98–111)
CO2 SERPL-SCNC: 20 MEQ/L (ref 23–33)
COLLECTED BY:: ABNORMAL
COMMENT: ABNORMAL
COMMENT: ABNORMAL
CREAT SERPL-MCNC: 1 MG/DL (ref 0.4–1.2)
DEPRECATED RDW RBC AUTO: 50.3 FL (ref 35–45)
DEVICE: ABNORMAL
ERYTHROCYTE [DISTWIDTH] IN BLOOD BY AUTOMATED COUNT: 15.6 % (ref 11.5–14.5)
FIO2 ON VENT O2 ANALYZER: 35 %
FIO2 ON VENT O2 ANALYZER: 80 %
GFR SERPL CREATININE-BSD FRML MDRD: > 60 ML/MIN/1.73M2
GLUCOSE BLD STRIP.AUTO-MCNC: 120 MG/DL (ref 70–108)
GLUCOSE BLD STRIP.AUTO-MCNC: 129 MG/DL (ref 70–108)
GLUCOSE BLD STRIP.AUTO-MCNC: 130 MG/DL (ref 70–108)
GLUCOSE BLD STRIP.AUTO-MCNC: 133 MG/DL (ref 70–108)
GLUCOSE BLD-MCNC: 103 MG/DL (ref 70–108)
GLUCOSE BLD-MCNC: 108 MG/DL (ref 70–108)
GLUCOSE BLD-MCNC: 128 MG/DL (ref 70–108)
GLUCOSE BLD-MCNC: 137 MG/DL (ref 70–108)
GLUCOSE BLD-MCNC: 143 MG/DL (ref 70–108)
GLUCOSE BLD-MCNC: 92 MG/DL (ref 70–108)
GLUCOSE BLD-MCNC: 97 MG/DL (ref 70–108)
GLUCOSE SERPL-MCNC: 104 MG/DL (ref 70–108)
HCO3 BLDA-SCNC: 24 MMOL/L (ref 23–28)
HCO3 BLDA-SCNC: 24 MMOL/L (ref 23–28)
HCO3 BLDA-SCNC: 25 MMOL/L (ref 23–28)
HCO3 BLDA-SCNC: 26 MMOL/L (ref 23–28)
HCO3 BLDA-SCNC: 28 MMOL/L (ref 23–28)
HCT VFR BLD AUTO: 25.8 % (ref 42–52)
HCT VFR BLD AUTO: 29.5 % (ref 42–52)
HCT VFR BLD AUTO: 29.6 % (ref 42–52)
HCT VFR BLD AUTO: 37.1 % (ref 42–52)
HEMOGLOBIN FINGERSTICK, POC: 11.4 G/DL (ref 14–18)
HEMOGLOBIN FINGERSTICK, POC: 8.4 G/DL (ref 14–18)
HEMOGLOBIN FINGERSTICK, POC: 8.8 G/DL (ref 14–18)
HEMOGLOBIN FINGERSTICK, POC: 9 G/DL (ref 14–18)
HGB BLD-MCNC: 12.3 GM/DL (ref 14–18)
HGB BLD-MCNC: 8.5 GM/DL (ref 14–18)
HGB BLD-MCNC: 9.4 GM/DL (ref 14–18)
HGB BLD-MCNC: 9.6 GM/DL (ref 14–18)
INR PPP: 1.25 (ref 0.85–1.13)
MAGNESIUM SERPL-MCNC: 3.2 MG/DL (ref 1.6–2.4)
MCH RBC QN AUTO: 28.7 PG (ref 26–33)
MCHC RBC AUTO-ENTMCNC: 32.5 GM/DL (ref 32.2–35.5)
MCV RBC AUTO: 88.1 FL (ref 80–94)
PATIENT BOLUS: NORMAL
PATIENT HEPARIN CONCENTRATION: 0
PATIENT HEPARIN CONCENTRATION: 2
PATIENT HEPARIN CONCENTRATION: 2
PCO2 BLDA: 41 MMHG (ref 35–45)
PCO2 BLDA: 42 MMHG (ref 35–45)
PCO2 BLDA: 44 MMHG (ref 35–45)
PCO2 BLDA: 45 MMHG (ref 35–45)
PCO2 BLDA: 50 MMHG (ref 35–45)
PCO2 BLDA: 57 MMHG (ref 35–45)
PCO2 TEMP ADJ BLDMV: 63 MMHG (ref 41–51)
PEEP SETTING VENT: 6 MMHG
PEEP SETTING VENT: 6 MMHG
PH BLDA: 7.25 [PH] (ref 7.35–7.45)
PH BLDA: 7.32 [PH] (ref 7.35–7.45)
PH BLDA: 7.34 [PH] (ref 7.35–7.45)
PH BLDA: 7.37 [PH] (ref 7.35–7.45)
PH BLDA: 7.38 [PH] (ref 7.35–7.45)
PH BLDA: 7.4 [PH] (ref 7.35–7.45)
PH BLDMV: 7.25 [PH] (ref 7.31–7.41)
PIP: 22 CMH2O
PLATELET # BLD AUTO: 102 THOU/MM3 (ref 130–400)
PLATELET # BLD AUTO: 115 THOU/MM3 (ref 130–400)
PLATELET # BLD AUTO: 196 THOU/MM3 (ref 130–400)
PMV BLD AUTO: 10.6 FL (ref 9.4–12.4)
PO2 BLDA: 114 MMHG (ref 71–104)
PO2 BLDA: 199 MMHG (ref 71–104)
PO2 BLDA: 232 MMHG (ref 71–104)
PO2 BLDA: 240 MMHG (ref 71–104)
PO2 BLDA: 249 MMHG (ref 71–104)
PO2 BLDA: 433 MMHG (ref 71–104)
PO2 BLDMV: 61 MMHG (ref 25–40)
POTASSIUM BLD-SCNC: 3.9 MEQ/L (ref 3.5–4.9)
POTASSIUM BLD-SCNC: 4.2 MEQ/L (ref 3.5–4.9)
POTASSIUM BLD-SCNC: 4.3 MEQ/L (ref 3.5–4.9)
POTASSIUM BLD-SCNC: 4.3 MEQ/L (ref 3.5–4.9)
POTASSIUM BLD-SCNC: 4.4 MEQ/L (ref 3.5–4.9)
POTASSIUM SERPL-SCNC: 3.9 MEQ/L (ref 3.5–5.2)
POTASSIUM SERPL-SCNC: 4.7 MEQ/L (ref 3.5–5.2)
PRESSURE SUPPORT SETTING VENT: 10 CMH2O
PROJECTED HEPARIN CONCENTATION: 3
RANGE: NORMAL
RBC # BLD AUTO: 3.35 MILL/MM3 (ref 4.7–6.1)
SAO2 % BLDA: 100 %
SAO2 % BLDA: 98 %
SAO2 % BLDMV: 86 %
SODIUM BLD-SCNC: 140 MEQ/L (ref 138–146)
SODIUM BLD-SCNC: 142 MEQ/L (ref 138–146)
SODIUM BLD-SCNC: 142 MEQ/L (ref 138–146)
SODIUM BLD-SCNC: 143 MEQ/L (ref 138–146)
SODIUM BLD-SCNC: 144 MEQ/L (ref 138–146)
SODIUM SERPL-SCNC: 142 MEQ/L (ref 135–145)
VENTILATION MODE VENT: ABNORMAL
VENTILATION MODE VENT: ABNORMAL
WBC # BLD AUTO: 8.8 THOU/MM3 (ref 4.8–10.8)

## 2023-05-12 PROCEDURE — P9045 ALBUMIN (HUMAN), 5%, 250 ML: HCPCS | Performed by: NURSE ANESTHETIST, CERTIFIED REGISTERED

## 2023-05-12 PROCEDURE — 6360000002 HC RX W HCPCS

## 2023-05-12 PROCEDURE — 85610 PROTHROMBIN TIME: CPT

## 2023-05-12 PROCEDURE — 3700000001 HC ADD 15 MINUTES (ANESTHESIA): Performed by: THORACIC SURGERY (CARDIOTHORACIC VASCULAR SURGERY)

## 2023-05-12 PROCEDURE — 6370000000 HC RX 637 (ALT 250 FOR IP): Performed by: NURSE ANESTHETIST, CERTIFIED REGISTERED

## 2023-05-12 PROCEDURE — 021009W BYPASS CORONARY ARTERY, ONE ARTERY FROM AORTA WITH AUTOLOGOUS VENOUS TISSUE, OPEN APPROACH: ICD-10-PCS | Performed by: THORACIC SURGERY (CARDIOTHORACIC VASCULAR SURGERY)

## 2023-05-12 PROCEDURE — 87086 URINE CULTURE/COLONY COUNT: CPT

## 2023-05-12 PROCEDURE — 2720000010 HC SURG SUPPLY STERILE: Performed by: THORACIC SURGERY (CARDIOTHORACIC VASCULAR SURGERY)

## 2023-05-12 PROCEDURE — 6360000002 HC RX W HCPCS: Performed by: THORACIC SURGERY (CARDIOTHORACIC VASCULAR SURGERY)

## 2023-05-12 PROCEDURE — 85018 HEMOGLOBIN: CPT

## 2023-05-12 PROCEDURE — 33518 CABG ARTERY-VEIN TWO: CPT | Performed by: THORACIC SURGERY (CARDIOTHORACIC VASCULAR SURGERY)

## 2023-05-12 PROCEDURE — 83735 ASSAY OF MAGNESIUM: CPT

## 2023-05-12 PROCEDURE — 2580000003 HC RX 258

## 2023-05-12 PROCEDURE — 85027 COMPLETE CBC AUTOMATED: CPT

## 2023-05-12 PROCEDURE — 94002 VENT MGMT INPAT INIT DAY: CPT

## 2023-05-12 PROCEDURE — 82803 BLOOD GASES ANY COMBINATION: CPT

## 2023-05-12 PROCEDURE — P9041 ALBUMIN (HUMAN),5%, 50ML: HCPCS

## 2023-05-12 PROCEDURE — B24BZZ4 ULTRASONOGRAPHY OF HEART WITH AORTA, TRANSESOPHAGEAL: ICD-10-PCS | Performed by: ANESTHESIOLOGY

## 2023-05-12 PROCEDURE — C1729 CATH, DRAINAGE: HCPCS | Performed by: THORACIC SURGERY (CARDIOTHORACIC VASCULAR SURGERY)

## 2023-05-12 PROCEDURE — 2580000003 HC RX 258: Performed by: THORACIC SURGERY (CARDIOTHORACIC VASCULAR SURGERY)

## 2023-05-12 PROCEDURE — 76998 US GUIDE INTRAOP: CPT | Performed by: THORACIC SURGERY (CARDIOTHORACIC VASCULAR SURGERY)

## 2023-05-12 PROCEDURE — 6360000002 HC RX W HCPCS: Performed by: NURSE ANESTHETIST, CERTIFIED REGISTERED

## 2023-05-12 PROCEDURE — 2000000000 HC ICU R&B

## 2023-05-12 PROCEDURE — 94660 CPAP INITIATION&MGMT: CPT

## 2023-05-12 PROCEDURE — 3600000008 HC SURGERY OHS BASE: Performed by: THORACIC SURGERY (CARDIOTHORACIC VASCULAR SURGERY)

## 2023-05-12 PROCEDURE — 84295 ASSAY OF SERUM SODIUM: CPT

## 2023-05-12 PROCEDURE — 84132 ASSAY OF SERUM POTASSIUM: CPT

## 2023-05-12 PROCEDURE — 2709999900 HC NON-CHARGEABLE SUPPLY: Performed by: THORACIC SURGERY (CARDIOTHORACIC VASCULAR SURGERY)

## 2023-05-12 PROCEDURE — 02100Z9 BYPASS CORONARY ARTERY, ONE ARTERY FROM LEFT INTERNAL MAMMARY, OPEN APPROACH: ICD-10-PCS | Performed by: THORACIC SURGERY (CARDIOTHORACIC VASCULAR SURGERY)

## 2023-05-12 PROCEDURE — 6360000002 HC RX W HCPCS: Performed by: PHYSICIAN ASSISTANT

## 2023-05-12 PROCEDURE — 2500000003 HC RX 250 WO HCPCS: Performed by: NURSE ANESTHETIST, CERTIFIED REGISTERED

## 2023-05-12 PROCEDURE — 33533 CABG ARTERIAL SINGLE: CPT | Performed by: THORACIC SURGERY (CARDIOTHORACIC VASCULAR SURGERY)

## 2023-05-12 PROCEDURE — 36430 TRANSFUSION BLD/BLD COMPNT: CPT

## 2023-05-12 PROCEDURE — 3600000018 HC SURGERY OHS ADDTL 15MIN: Performed by: THORACIC SURGERY (CARDIOTHORACIC VASCULAR SURGERY)

## 2023-05-12 PROCEDURE — 4A133B1 MONITORING OF ARTERIAL PRESSURE, PERIPHERAL, PERCUTANEOUS APPROACH: ICD-10-PCS | Performed by: ANESTHESIOLOGY

## 2023-05-12 PROCEDURE — 36415 COLL VENOUS BLD VENIPUNCTURE: CPT

## 2023-05-12 PROCEDURE — 2580000003 HC RX 258: Performed by: PHYSICIAN ASSISTANT

## 2023-05-12 PROCEDURE — 33970 AORTIC CIRCULATION ASSIST: CPT | Performed by: PHYSICIAN ASSISTANT

## 2023-05-12 PROCEDURE — 2500000003 HC RX 250 WO HCPCS: Performed by: THORACIC SURGERY (CARDIOTHORACIC VASCULAR SURGERY)

## 2023-05-12 PROCEDURE — 33508 ENDOSCOPIC VEIN HARVEST: CPT | Performed by: PHYSICIAN ASSISTANT

## 2023-05-12 PROCEDURE — 4A133J1 MONITORING OF ARTERIAL PULSE, PERIPHERAL, PERCUTANEOUS APPROACH: ICD-10-PCS | Performed by: ANESTHESIOLOGY

## 2023-05-12 PROCEDURE — 85520 HEPARIN ASSAY: CPT

## 2023-05-12 PROCEDURE — 3700000000 HC ANESTHESIA ATTENDED CARE: Performed by: THORACIC SURGERY (CARDIOTHORACIC VASCULAR SURGERY)

## 2023-05-12 PROCEDURE — P9016 RBC LEUKOCYTES REDUCED: HCPCS

## 2023-05-12 PROCEDURE — 80048 BASIC METABOLIC PNL TOTAL CA: CPT

## 2023-05-12 PROCEDURE — 82330 ASSAY OF CALCIUM: CPT

## 2023-05-12 PROCEDURE — 37799 UNLISTED PX VASCULAR SURGERY: CPT

## 2023-05-12 PROCEDURE — 03HY32Z INSERTION OF MONITORING DEVICE INTO UPPER ARTERY, PERCUTANEOUS APPROACH: ICD-10-PCS | Performed by: ANESTHESIOLOGY

## 2023-05-12 PROCEDURE — 33517 CABG ARTERY-VEIN SINGLE: CPT | Performed by: PHYSICIAN ASSISTANT

## 2023-05-12 PROCEDURE — 2500000003 HC RX 250 WO HCPCS

## 2023-05-12 PROCEDURE — 82947 ASSAY GLUCOSE BLOOD QUANT: CPT

## 2023-05-12 PROCEDURE — 33508 ENDOSCOPIC VEIN HARVEST: CPT | Performed by: THORACIC SURGERY (CARDIOTHORACIC VASCULAR SURGERY)

## 2023-05-12 PROCEDURE — A4216 STERILE WATER/SALINE, 10 ML: HCPCS | Performed by: THORACIC SURGERY (CARDIOTHORACIC VASCULAR SURGERY)

## 2023-05-12 PROCEDURE — 86923 COMPATIBILITY TEST ELECTRIC: CPT

## 2023-05-12 PROCEDURE — 33970 AORTIC CIRCULATION ASSIST: CPT | Performed by: THORACIC SURGERY (CARDIOTHORACIC VASCULAR SURGERY)

## 2023-05-12 PROCEDURE — 71045 X-RAY EXAM CHEST 1 VIEW: CPT

## 2023-05-12 PROCEDURE — P9041 ALBUMIN (HUMAN),5%, 50ML: HCPCS | Performed by: THORACIC SURGERY (CARDIOTHORACIC VASCULAR SURGERY)

## 2023-05-12 PROCEDURE — 05HM33Z INSERTION OF INFUSION DEVICE INTO RIGHT INTERNAL JUGULAR VEIN, PERCUTANEOUS APPROACH: ICD-10-PCS | Performed by: ANESTHESIOLOGY

## 2023-05-12 PROCEDURE — 6370000000 HC RX 637 (ALT 250 FOR IP): Performed by: THORACIC SURGERY (CARDIOTHORACIC VASCULAR SURGERY)

## 2023-05-12 PROCEDURE — B211110 FLUOROSCOPY OF MULTIPLE CORONARY ARTERIES USING LOW OSMOLAR CONTRAST, LASER INTRAOPERATIVE: ICD-10-PCS | Performed by: THORACIC SURGERY (CARDIOTHORACIC VASCULAR SURGERY)

## 2023-05-12 PROCEDURE — C1889 IMPLANT/INSERT DEVICE, NOC: HCPCS | Performed by: THORACIC SURGERY (CARDIOTHORACIC VASCULAR SURGERY)

## 2023-05-12 PROCEDURE — 2580000003 HC RX 258: Performed by: NURSE ANESTHETIST, CERTIFIED REGISTERED

## 2023-05-12 PROCEDURE — 06BP4ZZ EXCISION OF RIGHT SAPHENOUS VEIN, PERCUTANEOUS ENDOSCOPIC APPROACH: ICD-10-PCS | Performed by: THORACIC SURGERY (CARDIOTHORACIC VASCULAR SURGERY)

## 2023-05-12 PROCEDURE — 33533 CABG ARTERIAL SINGLE: CPT | Performed by: PHYSICIAN ASSISTANT

## 2023-05-12 PROCEDURE — 85014 HEMATOCRIT: CPT

## 2023-05-12 PROCEDURE — 82948 REAGENT STRIP/BLOOD GLUCOSE: CPT

## 2023-05-12 PROCEDURE — 87070 CULTURE OTHR SPECIMN AEROBIC: CPT

## 2023-05-12 PROCEDURE — 5A02210 ASSISTANCE WITH CARDIAC OUTPUT USING BALLOON PUMP, CONTINUOUS: ICD-10-PCS | Performed by: THORACIC SURGERY (CARDIOTHORACIC VASCULAR SURGERY)

## 2023-05-12 RX ORDER — AMIODARONE HYDROCHLORIDE 200 MG/1
200 TABLET ORAL 2 TIMES DAILY
Status: DISCONTINUED | OUTPATIENT
Start: 2023-05-12 | End: 2023-05-17 | Stop reason: HOSPADM

## 2023-05-12 RX ORDER — SODIUM CHLORIDE 0.9 % (FLUSH) 0.9 %
5-40 SYRINGE (ML) INJECTION PRN
Status: DISCONTINUED | OUTPATIENT
Start: 2023-05-12 | End: 2023-05-12 | Stop reason: HOSPADM

## 2023-05-12 RX ORDER — PHENYLEPHRINE HYDROCHLORIDE 10 MG/ML
INJECTION INTRAVENOUS PRN
Status: DISCONTINUED | OUTPATIENT
Start: 2023-05-12 | End: 2023-05-12 | Stop reason: SDUPTHER

## 2023-05-12 RX ORDER — ALBUTEROL SULFATE 90 UG/1
2 AEROSOL, METERED RESPIRATORY (INHALATION) EVERY 6 HOURS PRN
Status: DISCONTINUED | OUTPATIENT
Start: 2023-05-12 | End: 2023-05-17 | Stop reason: HOSPADM

## 2023-05-12 RX ORDER — MORPHINE SULFATE 2 MG/ML
2 INJECTION, SOLUTION INTRAMUSCULAR; INTRAVENOUS
Status: DISCONTINUED | OUTPATIENT
Start: 2023-05-12 | End: 2023-05-17 | Stop reason: HOSPADM

## 2023-05-12 RX ORDER — VECURONIUM BROMIDE 1 MG/ML
INJECTION, POWDER, LYOPHILIZED, FOR SOLUTION INTRAVENOUS PRN
Status: DISCONTINUED | OUTPATIENT
Start: 2023-05-12 | End: 2023-05-12 | Stop reason: SDUPTHER

## 2023-05-12 RX ORDER — ENALAPRILAT 2.5 MG/2ML
0.62 INJECTION INTRAVENOUS
Status: DISCONTINUED | OUTPATIENT
Start: 2023-05-12 | End: 2023-05-17 | Stop reason: HOSPADM

## 2023-05-12 RX ORDER — PROPOFOL 10 MG/ML
INJECTION, EMULSION INTRAVENOUS PRN
Status: DISCONTINUED | OUTPATIENT
Start: 2023-05-12 | End: 2023-05-12 | Stop reason: SDUPTHER

## 2023-05-12 RX ORDER — SENNOSIDES 8.8 MG/5ML
10 LIQUID ORAL DAILY PRN
Status: DISCONTINUED | OUTPATIENT
Start: 2023-05-12 | End: 2023-05-17 | Stop reason: HOSPADM

## 2023-05-12 RX ORDER — VASOPRESSIN 20 U/ML
INJECTION PARENTERAL PRN
Status: DISCONTINUED | OUTPATIENT
Start: 2023-05-12 | End: 2023-05-12 | Stop reason: SDUPTHER

## 2023-05-12 RX ORDER — SODIUM CHLORIDE 9 MG/ML
INJECTION, SOLUTION INTRAVENOUS PRN
Status: COMPLETED | OUTPATIENT
Start: 2023-05-12 | End: 2023-05-12

## 2023-05-12 RX ORDER — LEVOTHYROXINE SODIUM 0.12 MG/1
125 TABLET ORAL DAILY
Status: DISCONTINUED | OUTPATIENT
Start: 2023-05-12 | End: 2023-05-17 | Stop reason: HOSPADM

## 2023-05-12 RX ORDER — SENNA AND DOCUSATE SODIUM 50; 8.6 MG/1; MG/1
1 TABLET, FILM COATED ORAL 2 TIMES DAILY
Status: DISCONTINUED | OUTPATIENT
Start: 2023-05-12 | End: 2023-05-17 | Stop reason: HOSPADM

## 2023-05-12 RX ORDER — METOPROLOL TARTRATE 5 MG/5ML
2.5 INJECTION INTRAVENOUS EVERY 10 MIN PRN
Status: DISCONTINUED | OUTPATIENT
Start: 2023-05-12 | End: 2023-05-17 | Stop reason: HOSPADM

## 2023-05-12 RX ORDER — SODIUM CHLORIDE 0.9 % (FLUSH) 0.9 %
5-40 SYRINGE (ML) INJECTION EVERY 12 HOURS SCHEDULED
Status: DISCONTINUED | OUTPATIENT
Start: 2023-05-12 | End: 2023-05-12 | Stop reason: HOSPADM

## 2023-05-12 RX ORDER — FENTANYL CITRATE 50 UG/ML
INJECTION, SOLUTION INTRAMUSCULAR; INTRAVENOUS PRN
Status: DISCONTINUED | OUTPATIENT
Start: 2023-05-12 | End: 2023-05-12 | Stop reason: SDUPTHER

## 2023-05-12 RX ORDER — SODIUM CHLORIDE 9 MG/ML
INJECTION, SOLUTION INTRAVENOUS PRN
Status: DISCONTINUED | OUTPATIENT
Start: 2023-05-12 | End: 2023-05-17 | Stop reason: HOSPADM

## 2023-05-12 RX ORDER — SODIUM CHLORIDE 0.9 % (FLUSH) 0.9 %
5-40 SYRINGE (ML) INJECTION EVERY 12 HOURS SCHEDULED
Status: DISCONTINUED | OUTPATIENT
Start: 2023-05-12 | End: 2023-05-17 | Stop reason: HOSPADM

## 2023-05-12 RX ORDER — AMINOCAPROIC ACID 250 MG/ML
INJECTION, SOLUTION INTRAVENOUS PRN
Status: DISCONTINUED | OUTPATIENT
Start: 2023-05-12 | End: 2023-05-12 | Stop reason: SDUPTHER

## 2023-05-12 RX ORDER — MAGNESIUM SULFATE IN WATER 40 MG/ML
2000 INJECTION, SOLUTION INTRAVENOUS PRN
Status: DISCONTINUED | OUTPATIENT
Start: 2023-05-12 | End: 2023-05-17 | Stop reason: HOSPADM

## 2023-05-12 RX ORDER — PROTAMINE SULFATE 10 MG/ML
50 INJECTION, SOLUTION INTRAVENOUS
Status: DISPENSED | OUTPATIENT
Start: 2023-05-12 | End: 2023-05-13

## 2023-05-12 RX ORDER — ATORVASTATIN CALCIUM 20 MG/1
20 TABLET, FILM COATED ORAL NIGHTLY
Status: DISCONTINUED | OUTPATIENT
Start: 2023-05-13 | End: 2023-05-17 | Stop reason: HOSPADM

## 2023-05-12 RX ORDER — EPHEDRINE SULFATE/0.9% NACL/PF 50 MG/5 ML
SYRINGE (ML) INTRAVENOUS PRN
Status: DISCONTINUED | OUTPATIENT
Start: 2023-05-12 | End: 2023-05-12

## 2023-05-12 RX ORDER — ACETAMINOPHEN 325 MG/1
650 TABLET ORAL EVERY 4 HOURS PRN
Status: DISCONTINUED | OUTPATIENT
Start: 2023-05-12 | End: 2023-05-17 | Stop reason: HOSPADM

## 2023-05-12 RX ORDER — EPHEDRINE SULFATE/0.9% NACL/PF 50 MG/5 ML
SYRINGE (ML) INTRAVENOUS PRN
Status: DISCONTINUED | OUTPATIENT
Start: 2023-05-12 | End: 2023-05-12 | Stop reason: SDUPTHER

## 2023-05-12 RX ORDER — DEXTROSE MONOHYDRATE 100 MG/ML
INJECTION, SOLUTION INTRAVENOUS CONTINUOUS PRN
Status: DISCONTINUED | OUTPATIENT
Start: 2023-05-12 | End: 2023-05-17 | Stop reason: HOSPADM

## 2023-05-12 RX ORDER — ALBUMIN, HUMAN INJ 5% 5 %
SOLUTION INTRAVENOUS
Status: COMPLETED
Start: 2023-05-12 | End: 2023-05-12

## 2023-05-12 RX ORDER — MAGNESIUM SULFATE HEPTAHYDRATE 500 MG/ML
INJECTION, SOLUTION INTRAMUSCULAR; INTRAVENOUS PRN
Status: DISCONTINUED | OUTPATIENT
Start: 2023-05-12 | End: 2023-05-12 | Stop reason: SDUPTHER

## 2023-05-12 RX ORDER — OXYCODONE HYDROCHLORIDE 5 MG/1
10 TABLET ORAL EVERY 4 HOURS PRN
Status: DISCONTINUED | OUTPATIENT
Start: 2023-05-12 | End: 2023-05-17 | Stop reason: HOSPADM

## 2023-05-12 RX ORDER — ONDANSETRON 4 MG/1
4 TABLET, ORALLY DISINTEGRATING ORAL EVERY 8 HOURS PRN
Status: DISCONTINUED | OUTPATIENT
Start: 2023-05-12 | End: 2023-05-17 | Stop reason: HOSPADM

## 2023-05-12 RX ORDER — FAMOTIDINE 20 MG/1
20 TABLET, FILM COATED ORAL 2 TIMES DAILY
Status: DISCONTINUED | OUTPATIENT
Start: 2023-05-12 | End: 2023-05-17 | Stop reason: HOSPADM

## 2023-05-12 RX ORDER — ALBUMIN, HUMAN INJ 5% 5 %
SOLUTION INTRAVENOUS PRN
Status: DISCONTINUED | OUTPATIENT
Start: 2023-05-12 | End: 2023-05-12 | Stop reason: SDUPTHER

## 2023-05-12 RX ORDER — ONDANSETRON 2 MG/ML
4 INJECTION INTRAMUSCULAR; INTRAVENOUS EVERY 6 HOURS PRN
Status: DISCONTINUED | OUTPATIENT
Start: 2023-05-12 | End: 2023-05-17 | Stop reason: HOSPADM

## 2023-05-12 RX ORDER — SODIUM CHLORIDE 9 MG/ML
INJECTION, SOLUTION INTRAVENOUS CONTINUOUS
Status: DISCONTINUED | OUTPATIENT
Start: 2023-05-12 | End: 2023-05-14

## 2023-05-12 RX ORDER — SODIUM CHLORIDE 9 MG/ML
INJECTION, SOLUTION INTRAVENOUS CONTINUOUS
Status: DISCONTINUED | OUTPATIENT
Start: 2023-05-12 | End: 2023-05-12

## 2023-05-12 RX ORDER — EPINEPHRINE 1 MG/ML
INJECTION, SOLUTION, CONCENTRATE INTRAVENOUS PRN
Status: DISCONTINUED | OUTPATIENT
Start: 2023-05-12 | End: 2023-05-12 | Stop reason: SDUPTHER

## 2023-05-12 RX ORDER — OXYCODONE HYDROCHLORIDE 5 MG/1
5 TABLET ORAL EVERY 4 HOURS PRN
Status: DISCONTINUED | OUTPATIENT
Start: 2023-05-12 | End: 2023-05-17 | Stop reason: HOSPADM

## 2023-05-12 RX ORDER — ALBUMIN, HUMAN INJ 5% 5 %
25 SOLUTION INTRAVENOUS PRN
Status: DISCONTINUED | OUTPATIENT
Start: 2023-05-12 | End: 2023-05-17 | Stop reason: HOSPADM

## 2023-05-12 RX ORDER — SODIUM CHLORIDE 0.9 % (FLUSH) 0.9 %
5-40 SYRINGE (ML) INJECTION PRN
Status: DISCONTINUED | OUTPATIENT
Start: 2023-05-12 | End: 2023-05-17 | Stop reason: HOSPADM

## 2023-05-12 RX ORDER — ENOXAPARIN SODIUM 100 MG/ML
40 INJECTION SUBCUTANEOUS EVERY 24 HOURS
Status: DISCONTINUED | OUTPATIENT
Start: 2023-05-13 | End: 2023-05-17 | Stop reason: HOSPADM

## 2023-05-12 RX ORDER — MULTIVITAMIN WITH IRON
1 TABLET ORAL
Status: DISCONTINUED | OUTPATIENT
Start: 2023-05-13 | End: 2023-05-17 | Stop reason: HOSPADM

## 2023-05-12 RX ORDER — HEPARIN SODIUM 1000 [USP'U]/ML
INJECTION, SOLUTION INTRAVENOUS; SUBCUTANEOUS PRN
Status: DISCONTINUED | OUTPATIENT
Start: 2023-05-12 | End: 2023-05-12 | Stop reason: SDUPTHER

## 2023-05-12 RX ORDER — SODIUM CHLORIDE, SODIUM GLUCONATE, SODIUM ACETATE, POTASSIUM CHLORIDE AND MAGNESIUM CHLORIDE 526; 502; 368; 37; 30 MG/100ML; MG/100ML; MG/100ML; MG/100ML; MG/100ML
INJECTION, SOLUTION INTRAVENOUS CONTINUOUS PRN
Status: DISCONTINUED | OUTPATIENT
Start: 2023-05-12 | End: 2023-05-12 | Stop reason: SDUPTHER

## 2023-05-12 RX ORDER — SODIUM CHLORIDE 9 MG/ML
INJECTION, SOLUTION INTRAVENOUS PRN
Status: DISCONTINUED | OUTPATIENT
Start: 2023-05-12 | End: 2023-05-12 | Stop reason: HOSPADM

## 2023-05-12 RX ORDER — MIDAZOLAM HYDROCHLORIDE 1 MG/ML
INJECTION INTRAMUSCULAR; INTRAVENOUS PRN
Status: DISCONTINUED | OUTPATIENT
Start: 2023-05-12 | End: 2023-05-12 | Stop reason: SDUPTHER

## 2023-05-12 RX ORDER — POTASSIUM CHLORIDE 29.8 MG/ML
20 INJECTION INTRAVENOUS PRN
Status: DISCONTINUED | OUTPATIENT
Start: 2023-05-12 | End: 2023-05-17 | Stop reason: HOSPADM

## 2023-05-12 RX ORDER — ROCURONIUM BROMIDE 10 MG/ML
INJECTION, SOLUTION INTRAVENOUS PRN
Status: DISCONTINUED | OUTPATIENT
Start: 2023-05-12 | End: 2023-05-12 | Stop reason: SDUPTHER

## 2023-05-12 RX ORDER — CEFAZOLIN SODIUM 1 G/3ML
INJECTION, POWDER, FOR SOLUTION INTRAMUSCULAR; INTRAVENOUS PRN
Status: DISCONTINUED | OUTPATIENT
Start: 2023-05-12 | End: 2023-05-12 | Stop reason: SDUPTHER

## 2023-05-12 RX ADMIN — FENTANYL CITRATE 100 MCG: 50 INJECTION INTRAMUSCULAR; INTRAVENOUS at 09:27

## 2023-05-12 RX ADMIN — ALBUMIN (HUMAN) 25 G: 12.5 INJECTION, SOLUTION INTRAVENOUS at 13:15

## 2023-05-12 RX ADMIN — Medication 2000 MG: at 23:00

## 2023-05-12 RX ADMIN — EPINEPHRINE 20 MCG: 1 INJECTION, SOLUTION, CONCENTRATE INTRAVENOUS at 07:59

## 2023-05-12 RX ADMIN — ALBUMIN (HUMAN) 25 G: 12.5 INJECTION, SOLUTION INTRAVENOUS at 13:16

## 2023-05-12 RX ADMIN — FENTANYL CITRATE 150 MCG: 50 INJECTION INTRAMUSCULAR; INTRAVENOUS at 08:46

## 2023-05-12 RX ADMIN — VECURONIUM BROMIDE 6 MG: 10 INJECTION, POWDER, LYOPHILIZED, FOR SOLUTION INTRAVENOUS at 08:45

## 2023-05-12 RX ADMIN — HEPARIN SODIUM 5000 UNITS: 1000 INJECTION INTRAVENOUS; SUBCUTANEOUS at 08:45

## 2023-05-12 RX ADMIN — MAGNESIUM SULFATE HEPTAHYDRATE 1.5 G: 500 INJECTION, SOLUTION INTRAMUSCULAR; INTRAVENOUS at 11:15

## 2023-05-12 RX ADMIN — EPINEPHRINE 20 MCG: 1 INJECTION, SOLUTION, CONCENTRATE INTRAVENOUS at 09:57

## 2023-05-12 RX ADMIN — VASOPRESSIN 0.03 UNITS/MIN: 20 INJECTION INTRAVENOUS at 13:08

## 2023-05-12 RX ADMIN — AMINOCAPROIC ACID 5000 MG: 250 INJECTION, SOLUTION INTRAVENOUS at 08:32

## 2023-05-12 RX ADMIN — FAMOTIDINE 20 MG: 10 INJECTION, SOLUTION INTRAVENOUS at 22:58

## 2023-05-12 RX ADMIN — VASOPRESSIN 1.5 UNITS: 20 INJECTION INTRAVENOUS at 12:02

## 2023-05-12 RX ADMIN — MIDAZOLAM 1 MG: 1 INJECTION INTRAMUSCULAR; INTRAVENOUS at 08:46

## 2023-05-12 RX ADMIN — PHENYLEPHRINE HYDROCHLORIDE 200 MCG: 10 INJECTION INTRAVENOUS at 07:55

## 2023-05-12 RX ADMIN — FENTANYL CITRATE 50 MCG: 50 INJECTION INTRAMUSCULAR; INTRAVENOUS at 11:10

## 2023-05-12 RX ADMIN — AMINOCAPROIC ACID 5000 MG: 250 INJECTION, SOLUTION INTRAVENOUS at 11:15

## 2023-05-12 RX ADMIN — ALBUMIN (HUMAN) 12.5 G: 12.5 SOLUTION INTRAVENOUS at 11:40

## 2023-05-12 RX ADMIN — PROPOFOL 140 MG: 10 INJECTION, EMULSION INTRAVENOUS at 07:46

## 2023-05-12 RX ADMIN — FENTANYL CITRATE 100 MCG: 50 INJECTION INTRAMUSCULAR; INTRAVENOUS at 08:51

## 2023-05-12 RX ADMIN — SODIUM CHLORIDE: 9 INJECTION, SOLUTION INTRAVENOUS at 07:10

## 2023-05-12 RX ADMIN — MORPHINE SULFATE 2 MG: 2 INJECTION, SOLUTION INTRAMUSCULAR; INTRAVENOUS at 14:40

## 2023-05-12 RX ADMIN — EPINEPHRINE 10 MCG: 1 INJECTION, SOLUTION, CONCENTRATE INTRAVENOUS at 09:07

## 2023-05-12 RX ADMIN — SODIUM CHLORIDE: 9 INJECTION, SOLUTION INTRAVENOUS at 14:43

## 2023-05-12 RX ADMIN — MIDAZOLAM 2 MG: 1 INJECTION INTRAMUSCULAR; INTRAVENOUS at 11:03

## 2023-05-12 RX ADMIN — Medication 2000 MG: at 07:45

## 2023-05-12 RX ADMIN — VASOPRESSIN 0.03 UNITS/MIN: 20 INJECTION INTRAVENOUS at 13:07

## 2023-05-12 RX ADMIN — SODIUM CHLORIDE: 9 INJECTION, SOLUTION INTRAVENOUS at 13:44

## 2023-05-12 RX ADMIN — EPINEPHRINE 20 MCG: 1 INJECTION, SOLUTION, CONCENTRATE INTRAVENOUS at 10:00

## 2023-05-12 RX ADMIN — VASOPRESSIN 1.5 UNITS: 20 INJECTION INTRAVENOUS at 12:30

## 2023-05-12 RX ADMIN — Medication 100 MG: at 07:46

## 2023-05-12 RX ADMIN — EPINEPHRINE 20 MCG: 1 INJECTION, SOLUTION, CONCENTRATE INTRAVENOUS at 07:57

## 2023-05-12 RX ADMIN — PROPOFOL 50 MG: 10 INJECTION, EMULSION INTRAVENOUS at 08:51

## 2023-05-12 RX ADMIN — DESMOPRESSIN ACETATE 24 MCG: 4 INJECTION INTRAVENOUS; SUBCUTANEOUS at 11:13

## 2023-05-12 RX ADMIN — MIDAZOLAM 2 MG: 1 INJECTION INTRAMUSCULAR; INTRAVENOUS at 07:30

## 2023-05-12 RX ADMIN — PHENYLEPHRINE HYDROCHLORIDE 150 MCG: 10 INJECTION INTRAVENOUS at 07:53

## 2023-05-12 RX ADMIN — HEPARIN SODIUM 28000 UNITS: 1000 INJECTION INTRAVENOUS; SUBCUTANEOUS at 09:34

## 2023-05-12 RX ADMIN — EPINEPHRINE 3 MCG/MIN: 1 INJECTION INTRAMUSCULAR; INTRAVENOUS; SUBCUTANEOUS at 08:15

## 2023-05-12 RX ADMIN — FENTANYL CITRATE 100 MCG: 50 INJECTION INTRAMUSCULAR; INTRAVENOUS at 07:46

## 2023-05-12 RX ADMIN — Medication 25 MG: at 07:55

## 2023-05-12 RX ADMIN — ALBUMIN (HUMAN) 25 G: 12.5 INJECTION, SOLUTION INTRAVENOUS at 13:00

## 2023-05-12 RX ADMIN — EPINEPHRINE 20 MCG: 1 INJECTION, SOLUTION, CONCENTRATE INTRAVENOUS at 09:51

## 2023-05-12 RX ADMIN — VASOPRESSIN 1.5 UNITS: 20 INJECTION INTRAVENOUS at 11:01

## 2023-05-12 RX ADMIN — ALBUMIN (HUMAN) 25 G: 12.5 INJECTION, SOLUTION INTRAVENOUS at 21:34

## 2023-05-12 RX ADMIN — MIDAZOLAM 2 MG: 1 INJECTION INTRAMUSCULAR; INTRAVENOUS at 12:42

## 2023-05-12 RX ADMIN — CALCIUM CHLORIDE 2000 MG: 100 INJECTION, SOLUTION INTRAVENOUS at 15:13

## 2023-05-12 RX ADMIN — EPINEPHRINE 20 MCG: 1 INJECTION INTRAMUSCULAR; INTRAVENOUS; SUBCUTANEOUS at 08:01

## 2023-05-12 RX ADMIN — ALBUMIN (HUMAN) 25 G: 12.5 INJECTION, SOLUTION INTRAVENOUS at 23:41

## 2023-05-12 RX ADMIN — Medication 3 UNITS/HR: at 09:30

## 2023-05-12 RX ADMIN — CEFAZOLIN 2 G: 1 INJECTION, POWDER, FOR SOLUTION INTRAMUSCULAR; INTRAVENOUS at 11:37

## 2023-05-12 RX ADMIN — FENTANYL CITRATE 100 MCG: 50 INJECTION INTRAMUSCULAR; INTRAVENOUS at 12:48

## 2023-05-12 RX ADMIN — ALBUMIN (HUMAN) 25 G: 12.5 INJECTION, SOLUTION INTRAVENOUS at 13:17

## 2023-05-12 RX ADMIN — Medication 2000 MG: at 15:19

## 2023-05-12 RX ADMIN — SODIUM CHLORIDE, SODIUM GLUCONATE, SODIUM ACETATE, POTASSIUM CHLORIDE AND MAGNESIUM CHLORIDE: 526; 502; 368; 37; 30 INJECTION, SOLUTION INTRAVENOUS at 07:30

## 2023-05-12 RX ADMIN — OXYCODONE HYDROCHLORIDE 10 MG: 5 TABLET ORAL at 18:34

## 2023-05-12 RX ADMIN — Medication 25 MG: at 07:59

## 2023-05-12 RX ADMIN — POTASSIUM CHLORIDE 20 MEQ: 29.8 INJECTION, SOLUTION INTRAVENOUS at 15:55

## 2023-05-12 RX ADMIN — MORPHINE SULFATE 2 MG: 2 INJECTION, SOLUTION INTRAMUSCULAR; INTRAVENOUS at 16:02

## 2023-05-12 RX ADMIN — POTASSIUM CHLORIDE 20 MEQ: 29.8 INJECTION, SOLUTION INTRAVENOUS at 14:55

## 2023-05-12 RX ADMIN — ROCURONIUM BROMIDE 50 MG: 10 INJECTION INTRAVENOUS at 07:46

## 2023-05-12 ASSESSMENT — PULMONARY FUNCTION TESTS
PIF_VALUE: 13
PIF_VALUE: 11
PIF_VALUE: 10
PIF_VALUE: 0
PIF_VALUE: 11
PIF_VALUE: 12
PIF_VALUE: 11
PIF_VALUE: 16
PIF_VALUE: 20

## 2023-05-12 ASSESSMENT — PAIN - FUNCTIONAL ASSESSMENT: PAIN_FUNCTIONAL_ASSESSMENT: NONE - DENIES PAIN

## 2023-05-12 ASSESSMENT — PAIN SCALES - GENERAL
PAINLEVEL_OUTOF10: 5
PAINLEVEL_OUTOF10: 5
PAINLEVEL_OUTOF10: 8
PAINLEVEL_OUTOF10: 0

## 2023-05-12 NOTE — ANESTHESIA PRE PROCEDURE
RISKS BENEFITS AND ALTERNATES DISCUSSED . PT. CONSENTED. PLAN GA, INVASIVE MONITORING, INTRA OP SAL AND POST OP ICU CARE AND VENT. SUPPORT)  Induction: intravenous. arterial line, BIS, central line, CVP, PA catheter and SAL  MIPS: Postoperative opioids intended, Prophylactic antiemetics administered and Postoperative ventilation. Anesthetic plan and risks discussed with patient and spouse. Use of blood products discussed with patient and spouse whom consented to blood products. Plan discussed with CRNA.                     Antonio Almazan MD   5/12/2023

## 2023-05-13 ENCOUNTER — APPOINTMENT (OUTPATIENT)
Dept: GENERAL RADIOLOGY | Age: 68
DRG: 236 | End: 2023-05-13
Attending: THORACIC SURGERY (CARDIOTHORACIC VASCULAR SURGERY)
Payer: MEDICARE

## 2023-05-13 LAB
ANION GAP SERPL CALC-SCNC: 14 MEQ/L (ref 8–16)
BUN SERPL-MCNC: 22 MG/DL (ref 7–22)
CALCIUM SERPL-MCNC: 8.3 MG/DL (ref 8.5–10.5)
CHLORIDE SERPL-SCNC: 110 MEQ/L (ref 98–111)
CO2 SERPL-SCNC: 20 MEQ/L (ref 23–33)
CREAT SERPL-MCNC: 1.3 MG/DL (ref 0.4–1.2)
DEPRECATED RDW RBC AUTO: 53.6 FL (ref 35–45)
DEPRECATED RDW RBC AUTO: 53.6 FL (ref 35–45)
EKG ATRIAL RATE: 55 BPM
EKG ATRIAL RATE: 60 BPM
EKG P AXIS: 46 DEGREES
EKG P AXIS: 78 DEGREES
EKG P-R INTERVAL: 198 MS
EKG P-R INTERVAL: 208 MS
EKG Q-T INTERVAL: 418 MS
EKG Q-T INTERVAL: 432 MS
EKG QRS DURATION: 106 MS
EKG QRS DURATION: 96 MS
EKG QTC CALCULATION (BAZETT): 413 MS
EKG QTC CALCULATION (BAZETT): 418 MS
EKG R AXIS: 0 DEGREES
EKG R AXIS: 39 DEGREES
EKG T AXIS: -167 DEGREES
EKG T AXIS: 65 DEGREES
EKG VENTRICULAR RATE: 55 BPM
EKG VENTRICULAR RATE: 60 BPM
ERYTHROCYTE [DISTWIDTH] IN BLOOD BY AUTOMATED COUNT: 16.3 % (ref 11.5–14.5)
ERYTHROCYTE [DISTWIDTH] IN BLOOD BY AUTOMATED COUNT: 16.6 % (ref 11.5–14.5)
GFR SERPL CREATININE-BSD FRML MDRD: 60 ML/MIN/1.73M2
GLUCOSE BLD STRIP.AUTO-MCNC: 129 MG/DL (ref 70–108)
GLUCOSE BLD STRIP.AUTO-MCNC: 144 MG/DL (ref 70–108)
GLUCOSE BLD STRIP.AUTO-MCNC: 146 MG/DL (ref 70–108)
GLUCOSE BLD STRIP.AUTO-MCNC: 153 MG/DL (ref 70–108)
GLUCOSE BLD STRIP.AUTO-MCNC: 163 MG/DL (ref 70–108)
GLUCOSE BLD STRIP.AUTO-MCNC: 165 MG/DL (ref 70–108)
GLUCOSE BLD STRIP.AUTO-MCNC: 173 MG/DL (ref 70–108)
GLUCOSE BLD STRIP.AUTO-MCNC: 176 MG/DL (ref 70–108)
GLUCOSE BLD STRIP.AUTO-MCNC: 191 MG/DL (ref 70–108)
GLUCOSE BLD STRIP.AUTO-MCNC: 197 MG/DL (ref 70–108)
GLUCOSE SERPL-MCNC: 160 MG/DL (ref 70–108)
HCT VFR BLD AUTO: 22.6 % (ref 42–52)
HCT VFR BLD AUTO: 23.9 % (ref 42–52)
HCT VFR BLD AUTO: 24.3 % (ref 42–52)
HCT VFR BLD AUTO: 26.1 % (ref 42–52)
HGB BLD-MCNC: 7.2 GM/DL (ref 14–18)
HGB BLD-MCNC: 7.7 GM/DL (ref 14–18)
HGB BLD-MCNC: 7.9 GM/DL (ref 14–18)
HGB BLD-MCNC: 8.4 GM/DL (ref 14–18)
INR PPP: 1.33 (ref 0.85–1.13)
MAGNESIUM SERPL-MCNC: 2.5 MG/DL (ref 1.6–2.4)
MCH RBC QN AUTO: 28.1 PG (ref 26–33)
MCH RBC QN AUTO: 28.1 PG (ref 26–33)
MCHC RBC AUTO-ENTMCNC: 31.7 GM/DL (ref 32.2–35.5)
MCHC RBC AUTO-ENTMCNC: 31.9 GM/DL (ref 32.2–35.5)
MCV RBC AUTO: 88.3 FL (ref 80–94)
MCV RBC AUTO: 88.7 FL (ref 80–94)
PLATELET # BLD AUTO: 103 THOU/MM3 (ref 130–400)
PLATELET # BLD AUTO: 99 THOU/MM3 (ref 130–400)
PMV BLD AUTO: 10.8 FL (ref 9.4–12.4)
PMV BLD AUTO: 10.9 FL (ref 9.4–12.4)
POTASSIUM SERPL-SCNC: 4.3 MEQ/L (ref 3.5–5.2)
POTASSIUM SERPL-SCNC: 4.6 MEQ/L (ref 3.5–5.2)
POTASSIUM SERPL-SCNC: 5.1 MEQ/L (ref 3.5–5.2)
RBC # BLD AUTO: 2.56 MILL/MM3 (ref 4.7–6.1)
RBC # BLD AUTO: 2.74 MILL/MM3 (ref 4.7–6.1)
SCAN OF BLOOD SMEAR: NORMAL
SODIUM SERPL-SCNC: 144 MEQ/L (ref 135–145)
WBC # BLD AUTO: 7.7 THOU/MM3 (ref 4.8–10.8)
WBC # BLD AUTO: 8.2 THOU/MM3 (ref 4.8–10.8)

## 2023-05-13 PROCEDURE — 97530 THERAPEUTIC ACTIVITIES: CPT

## 2023-05-13 PROCEDURE — 82948 REAGENT STRIP/BLOOD GLUCOSE: CPT

## 2023-05-13 PROCEDURE — 84132 ASSAY OF SERUM POTASSIUM: CPT

## 2023-05-13 PROCEDURE — 93010 ELECTROCARDIOGRAM REPORT: CPT | Performed by: INTERNAL MEDICINE

## 2023-05-13 PROCEDURE — 36430 TRANSFUSION BLD/BLD COMPNT: CPT

## 2023-05-13 PROCEDURE — 97162 PT EVAL MOD COMPLEX 30 MIN: CPT

## 2023-05-13 PROCEDURE — 85018 HEMOGLOBIN: CPT

## 2023-05-13 PROCEDURE — 85027 COMPLETE CBC AUTOMATED: CPT

## 2023-05-13 PROCEDURE — 6360000002 HC RX W HCPCS: Performed by: THORACIC SURGERY (CARDIOTHORACIC VASCULAR SURGERY)

## 2023-05-13 PROCEDURE — 2000000000 HC ICU R&B

## 2023-05-13 PROCEDURE — 2580000003 HC RX 258: Performed by: THORACIC SURGERY (CARDIOTHORACIC VASCULAR SURGERY)

## 2023-05-13 PROCEDURE — 93005 ELECTROCARDIOGRAM TRACING: CPT | Performed by: THORACIC SURGERY (CARDIOTHORACIC VASCULAR SURGERY)

## 2023-05-13 PROCEDURE — 71045 X-RAY EXAM CHEST 1 VIEW: CPT

## 2023-05-13 PROCEDURE — 36415 COLL VENOUS BLD VENIPUNCTURE: CPT

## 2023-05-13 PROCEDURE — 85610 PROTHROMBIN TIME: CPT

## 2023-05-13 PROCEDURE — 85014 HEMATOCRIT: CPT

## 2023-05-13 PROCEDURE — 6370000000 HC RX 637 (ALT 250 FOR IP): Performed by: THORACIC SURGERY (CARDIOTHORACIC VASCULAR SURGERY)

## 2023-05-13 PROCEDURE — 83735 ASSAY OF MAGNESIUM: CPT

## 2023-05-13 PROCEDURE — 2500000003 HC RX 250 WO HCPCS: Performed by: THORACIC SURGERY (CARDIOTHORACIC VASCULAR SURGERY)

## 2023-05-13 PROCEDURE — 80048 BASIC METABOLIC PNL TOTAL CA: CPT

## 2023-05-13 RX ORDER — MIDODRINE HYDROCHLORIDE 10 MG/1
10 TABLET ORAL
Status: DISCONTINUED | OUTPATIENT
Start: 2023-05-13 | End: 2023-05-14

## 2023-05-13 RX ORDER — SODIUM CHLORIDE 9 MG/ML
INJECTION, SOLUTION INTRAVENOUS PRN
Status: DISCONTINUED | OUTPATIENT
Start: 2023-05-13 | End: 2023-05-17 | Stop reason: HOSPADM

## 2023-05-13 RX ORDER — INSULIN LISPRO 100 [IU]/ML
0-16 INJECTION, SOLUTION INTRAVENOUS; SUBCUTANEOUS
Status: DISCONTINUED | OUTPATIENT
Start: 2023-05-13 | End: 2023-05-16

## 2023-05-13 RX ORDER — INSULIN LISPRO 100 [IU]/ML
0-4 INJECTION, SOLUTION INTRAVENOUS; SUBCUTANEOUS NIGHTLY
Status: DISCONTINUED | OUTPATIENT
Start: 2023-05-13 | End: 2023-05-16

## 2023-05-13 RX ADMIN — SENNOSIDES AND DOCUSATE SODIUM 1 TABLET: 50; 8.6 TABLET ORAL at 20:12

## 2023-05-13 RX ADMIN — Medication 1 MCG/MIN: at 22:10

## 2023-05-13 RX ADMIN — Medication 2000 MG: at 17:19

## 2023-05-13 RX ADMIN — Medication 2000 MG: at 07:11

## 2023-05-13 RX ADMIN — ATORVASTATIN CALCIUM 20 MG: 20 TABLET, FILM COATED ORAL at 20:12

## 2023-05-13 RX ADMIN — AMIODARONE HYDROCHLORIDE 200 MG: 200 TABLET ORAL at 20:12

## 2023-05-13 RX ADMIN — OXYCODONE HYDROCHLORIDE 5 MG: 5 TABLET ORAL at 05:03

## 2023-05-13 RX ADMIN — FAMOTIDINE 20 MG: 20 TABLET ORAL at 08:36

## 2023-05-13 RX ADMIN — SODIUM CHLORIDE 2.74 UNITS/HR: 9 INJECTION, SOLUTION INTRAVENOUS at 03:20

## 2023-05-13 RX ADMIN — MIDODRINE HYDROCHLORIDE 10 MG: 10 TABLET ORAL at 17:20

## 2023-05-13 RX ADMIN — ONDANSETRON 4 MG: 2 INJECTION INTRAMUSCULAR; INTRAVENOUS at 08:22

## 2023-05-13 RX ADMIN — POTASSIUM CHLORIDE 20 MEQ: 29.8 INJECTION, SOLUTION INTRAVENOUS at 08:38

## 2023-05-13 RX ADMIN — ASPIRIN 325 MG: 325 TABLET, COATED ORAL at 08:35

## 2023-05-13 RX ADMIN — Medication 1 TABLET: at 08:35

## 2023-05-13 RX ADMIN — LEVOTHYROXINE SODIUM 125 MCG: 0.12 TABLET ORAL at 08:36

## 2023-05-13 RX ADMIN — AMIODARONE HYDROCHLORIDE 200 MG: 200 TABLET ORAL at 08:35

## 2023-05-13 RX ADMIN — FAMOTIDINE 20 MG: 20 TABLET ORAL at 20:12

## 2023-05-13 ASSESSMENT — PAIN SCALES - GENERAL: PAINLEVEL_OUTOF10: 5

## 2023-05-13 NOTE — ANESTHESIA POSTPROCEDURE EVALUATION
Department of Anesthesiology  Postprocedure Note    Patient: Jovanna Gambino  MRN: [id number]  YOB: 1955  Date of evaluation: 5/13/2023      Procedure Summary     Date: 05/12/23 Room / Location: [facility] OR  / [facility] OR    Anesthesia Start: 0730 Anesthesia Stop: 9251    Procedure: CABG x3 SAL; BALLOON PUMP (Chest) Diagnosis:       Coronary artery disease, unspecified vessel or lesion type, unspecified whether angina present, unspecified whether native or transplanted heart      (Coronary artery disease, unspecified vessel or lesion type, unspecified whether angina present, unspecified whether native or transplanted heart [I25.10])    Surgeons: Ole Cates MD Responsible Provider: Jose Alberto Ferris MD    Anesthesia Type: general ASA Status: 4          Anesthesia Type: No value filed. Karen Phase I: Karen Score: 10    Akren Phase II:        Anesthesia Post Evaluation    Patient location during evaluation: ICU  Patient participation: waiting for patient participation  Level of consciousness: awake and responsive to verbal stimuli  Airway patency: patent  Nausea & Vomiting: vomiting and nausea  Complications: no  Cardiovascular status: hemodynamically stable and vasoactive/inotropes  Respiratory status: room air and BIPAP  Hydration status: stable  Comments: Patient is in bed in the ICU on POD #1. IABP has been removed. He was extubated last night to Bipap for a portion of the night according to nursing staff until he vomited and the Bipap mask was removed. He appears stable on room air at this time. Vomiting appears improved after Zofran administration. Remains on Epinephrine drip 2 mcg/min.   Multimodal analgesia pain management approach

## 2023-05-14 ENCOUNTER — APPOINTMENT (OUTPATIENT)
Dept: GENERAL RADIOLOGY | Age: 68
DRG: 236 | End: 2023-05-14
Attending: THORACIC SURGERY (CARDIOTHORACIC VASCULAR SURGERY)
Payer: MEDICARE

## 2023-05-14 LAB
ANION GAP SERPL CALC-SCNC: 7 MEQ/L (ref 8–16)
BACTERIA SPEC AEROBE CULT: NORMAL
BACTERIA UR CULT: NORMAL
BUN SERPL-MCNC: 25 MG/DL (ref 7–22)
CALCIUM SERPL-MCNC: 7.9 MG/DL (ref 8.5–10.5)
CHLORIDE SERPL-SCNC: 107 MEQ/L (ref 98–111)
CO2 SERPL-SCNC: 23 MEQ/L (ref 23–33)
CREAT SERPL-MCNC: 1.2 MG/DL (ref 0.4–1.2)
DEPRECATED RDW RBC AUTO: 53.2 FL (ref 35–45)
EKG ATRIAL RATE: 65 BPM
EKG P AXIS: 50 DEGREES
EKG P-R INTERVAL: 196 MS
EKG Q-T INTERVAL: 394 MS
EKG QRS DURATION: 102 MS
EKG QTC CALCULATION (BAZETT): 409 MS
EKG R AXIS: 7 DEGREES
EKG T AXIS: 83 DEGREES
EKG VENTRICULAR RATE: 65 BPM
ERYTHROCYTE [DISTWIDTH] IN BLOOD BY AUTOMATED COUNT: 16.7 % (ref 11.5–14.5)
GFR SERPL CREATININE-BSD FRML MDRD: > 60 ML/MIN/1.73M2
GLUCOSE BLD STRIP.AUTO-MCNC: 87 MG/DL (ref 70–108)
GLUCOSE BLD STRIP.AUTO-MCNC: 89 MG/DL (ref 70–108)
GLUCOSE BLD STRIP.AUTO-MCNC: 89 MG/DL (ref 70–108)
GLUCOSE BLD STRIP.AUTO-MCNC: 93 MG/DL (ref 70–108)
GLUCOSE SERPL-MCNC: 126 MG/DL (ref 70–108)
HCT VFR BLD AUTO: 22.7 % (ref 42–52)
HGB BLD-MCNC: 7.4 GM/DL (ref 14–18)
INR PPP: 1.2 (ref 0.85–1.13)
MAGNESIUM SERPL-MCNC: 2.4 MG/DL (ref 1.6–2.4)
MCH RBC QN AUTO: 28.7 PG (ref 26–33)
MCHC RBC AUTO-ENTMCNC: 32.6 GM/DL (ref 32.2–35.5)
MCV RBC AUTO: 88 FL (ref 80–94)
PLATELET # BLD AUTO: 75 THOU/MM3 (ref 130–400)
PMV BLD AUTO: 10.8 FL (ref 9.4–12.4)
POTASSIUM SERPL-SCNC: 4.6 MEQ/L (ref 3.5–5.2)
RBC # BLD AUTO: 2.58 MILL/MM3 (ref 4.7–6.1)
SODIUM SERPL-SCNC: 137 MEQ/L (ref 135–145)
WBC # BLD AUTO: 7.9 THOU/MM3 (ref 4.8–10.8)

## 2023-05-14 PROCEDURE — 2580000003 HC RX 258: Performed by: THORACIC SURGERY (CARDIOTHORACIC VASCULAR SURGERY)

## 2023-05-14 PROCEDURE — 82948 REAGENT STRIP/BLOOD GLUCOSE: CPT

## 2023-05-14 PROCEDURE — 97167 OT EVAL HIGH COMPLEX 60 MIN: CPT

## 2023-05-14 PROCEDURE — 2700000000 HC OXYGEN THERAPY PER DAY

## 2023-05-14 PROCEDURE — 93010 ELECTROCARDIOGRAM REPORT: CPT | Performed by: INTERNAL MEDICINE

## 2023-05-14 PROCEDURE — 6360000002 HC RX W HCPCS: Performed by: THORACIC SURGERY (CARDIOTHORACIC VASCULAR SURGERY)

## 2023-05-14 PROCEDURE — 6360000002 HC RX W HCPCS

## 2023-05-14 PROCEDURE — 97530 THERAPEUTIC ACTIVITIES: CPT

## 2023-05-14 PROCEDURE — 93005 ELECTROCARDIOGRAM TRACING: CPT | Performed by: THORACIC SURGERY (CARDIOTHORACIC VASCULAR SURGERY)

## 2023-05-14 PROCEDURE — 83735 ASSAY OF MAGNESIUM: CPT

## 2023-05-14 PROCEDURE — 94760 N-INVAS EAR/PLS OXIMETRY 1: CPT

## 2023-05-14 PROCEDURE — 80048 BASIC METABOLIC PNL TOTAL CA: CPT

## 2023-05-14 PROCEDURE — 6370000000 HC RX 637 (ALT 250 FOR IP): Performed by: THORACIC SURGERY (CARDIOTHORACIC VASCULAR SURGERY)

## 2023-05-14 PROCEDURE — 71045 X-RAY EXAM CHEST 1 VIEW: CPT

## 2023-05-14 PROCEDURE — 85027 COMPLETE CBC AUTOMATED: CPT

## 2023-05-14 PROCEDURE — 97110 THERAPEUTIC EXERCISES: CPT

## 2023-05-14 PROCEDURE — 85610 PROTHROMBIN TIME: CPT

## 2023-05-14 PROCEDURE — 2060000000 HC ICU INTERMEDIATE R&B

## 2023-05-14 PROCEDURE — 36415 COLL VENOUS BLD VENIPUNCTURE: CPT

## 2023-05-14 RX ORDER — FUROSEMIDE 10 MG/ML
INJECTION INTRAMUSCULAR; INTRAVENOUS
Status: COMPLETED
Start: 2023-05-14 | End: 2023-05-14

## 2023-05-14 RX ORDER — FUROSEMIDE 10 MG/ML
20 INJECTION INTRAMUSCULAR; INTRAVENOUS 2 TIMES DAILY
Status: DISCONTINUED | OUTPATIENT
Start: 2023-05-14 | End: 2023-05-17 | Stop reason: HOSPADM

## 2023-05-14 RX ADMIN — FAMOTIDINE 20 MG: 20 TABLET ORAL at 20:31

## 2023-05-14 RX ADMIN — FUROSEMIDE 20 MG: 10 INJECTION, SOLUTION INTRAMUSCULAR; INTRAVENOUS at 09:43

## 2023-05-14 RX ADMIN — AMIODARONE HYDROCHLORIDE 200 MG: 200 TABLET ORAL at 09:38

## 2023-05-14 RX ADMIN — SODIUM CHLORIDE, PRESERVATIVE FREE 10 ML: 5 INJECTION INTRAVENOUS at 09:38

## 2023-05-14 RX ADMIN — FAMOTIDINE 20 MG: 20 TABLET ORAL at 09:38

## 2023-05-14 RX ADMIN — SODIUM CHLORIDE, PRESERVATIVE FREE 10 ML: 5 INJECTION INTRAVENOUS at 20:32

## 2023-05-14 RX ADMIN — SENNOSIDES AND DOCUSATE SODIUM 1 TABLET: 50; 8.6 TABLET ORAL at 09:38

## 2023-05-14 RX ADMIN — SENNOSIDES AND DOCUSATE SODIUM 1 TABLET: 50; 8.6 TABLET ORAL at 20:30

## 2023-05-14 RX ADMIN — FUROSEMIDE 20 MG: 10 INJECTION, SOLUTION INTRAMUSCULAR; INTRAVENOUS at 20:31

## 2023-05-14 RX ADMIN — Medication 2000 MG: at 00:04

## 2023-05-14 RX ADMIN — ACETAMINOPHEN 650 MG: 325 TABLET ORAL at 22:16

## 2023-05-14 RX ADMIN — ATORVASTATIN CALCIUM 20 MG: 20 TABLET, FILM COATED ORAL at 20:30

## 2023-05-14 RX ADMIN — Medication 1 TABLET: at 09:40

## 2023-05-14 RX ADMIN — ASPIRIN 325 MG: 325 TABLET, COATED ORAL at 09:38

## 2023-05-14 RX ADMIN — AMIODARONE HYDROCHLORIDE 200 MG: 200 TABLET ORAL at 20:23

## 2023-05-14 RX ADMIN — LEVOTHYROXINE SODIUM 125 MCG: 0.12 TABLET ORAL at 06:12

## 2023-05-14 ASSESSMENT — PAIN SCALES - GENERAL
PAINLEVEL_OUTOF10: 1
PAINLEVEL_OUTOF10: 2
PAINLEVEL_OUTOF10: 1

## 2023-05-14 ASSESSMENT — PAIN - FUNCTIONAL ASSESSMENT: PAIN_FUNCTIONAL_ASSESSMENT: PREVENTS OR INTERFERES SOME ACTIVE ACTIVITIES AND ADLS

## 2023-05-14 ASSESSMENT — PAIN DESCRIPTION - DESCRIPTORS: DESCRIPTORS: ACHING

## 2023-05-14 ASSESSMENT — PAIN DESCRIPTION - ORIENTATION: ORIENTATION: LEFT

## 2023-05-14 ASSESSMENT — PAIN DESCRIPTION - LOCATION: LOCATION: OTHER (COMMENT)

## 2023-05-15 ENCOUNTER — APPOINTMENT (OUTPATIENT)
Dept: GENERAL RADIOLOGY | Age: 68
DRG: 236 | End: 2023-05-15
Attending: THORACIC SURGERY (CARDIOTHORACIC VASCULAR SURGERY)
Payer: MEDICARE

## 2023-05-15 LAB
ANION GAP SERPL CALC-SCNC: 11 MEQ/L (ref 8–16)
BASOPHILS ABSOLUTE: 0 THOU/MM3 (ref 0–0.1)
BASOPHILS NFR BLD AUTO: 0.2 %
BUN SERPL-MCNC: 22 MG/DL (ref 7–22)
CALCIUM SERPL-MCNC: 7.9 MG/DL (ref 8.5–10.5)
CHLORIDE SERPL-SCNC: 102 MEQ/L (ref 98–111)
CO2 SERPL-SCNC: 24 MEQ/L (ref 23–33)
CREAT SERPL-MCNC: 1.1 MG/DL (ref 0.4–1.2)
DEPRECATED RDW RBC AUTO: 53.5 FL (ref 35–45)
EOSINOPHIL NFR BLD AUTO: 3.1 %
EOSINOPHILS ABSOLUTE: 0.3 THOU/MM3 (ref 0–0.4)
ERYTHROCYTE [DISTWIDTH] IN BLOOD BY AUTOMATED COUNT: 16.3 % (ref 11.5–14.5)
GFR SERPL CREATININE-BSD FRML MDRD: > 60 ML/MIN/1.73M2
GLUCOSE BLD STRIP.AUTO-MCNC: 105 MG/DL (ref 70–108)
GLUCOSE BLD STRIP.AUTO-MCNC: 110 MG/DL (ref 70–108)
GLUCOSE BLD STRIP.AUTO-MCNC: 119 MG/DL (ref 70–108)
GLUCOSE BLD STRIP.AUTO-MCNC: 171 MG/DL (ref 70–108)
GLUCOSE SERPL-MCNC: 113 MG/DL (ref 70–108)
HCT VFR BLD AUTO: 26.3 % (ref 42–52)
HGB BLD-MCNC: 8.3 GM/DL (ref 14–18)
IMM GRANULOCYTES # BLD AUTO: 0.06 THOU/MM3 (ref 0–0.07)
IMM GRANULOCYTES NFR BLD AUTO: 0.7 %
LYMPHOCYTES ABSOLUTE: 0.4 THOU/MM3 (ref 1–4.8)
LYMPHOCYTES NFR BLD AUTO: 5.2 %
MCH RBC QN AUTO: 28.3 PG (ref 26–33)
MCHC RBC AUTO-ENTMCNC: 31.6 GM/DL (ref 32.2–35.5)
MCV RBC AUTO: 89.8 FL (ref 80–94)
MONOCYTES ABSOLUTE: 0.6 THOU/MM3 (ref 0.4–1.3)
MONOCYTES NFR BLD AUTO: 7 %
NEUTROPHILS NFR BLD AUTO: 83.8 %
NRBC BLD AUTO-RTO: 0 /100 WBC
PLATELET # BLD AUTO: 101 THOU/MM3 (ref 130–400)
PMV BLD AUTO: 11.7 FL (ref 9.4–12.4)
POTASSIUM SERPL-SCNC: 4.2 MEQ/L (ref 3.5–5.2)
RBC # BLD AUTO: 2.93 MILL/MM3 (ref 4.7–6.1)
SEGMENTED NEUTROPHILS ABSOLUTE COUNT: 7 THOU/MM3 (ref 1.8–7.7)
SODIUM SERPL-SCNC: 137 MEQ/L (ref 135–145)
WBC # BLD AUTO: 8.3 THOU/MM3 (ref 4.8–10.8)

## 2023-05-15 PROCEDURE — APPSS30 APP SPLIT SHARED TIME 16-30 MINUTES: Performed by: PHYSICIAN ASSISTANT

## 2023-05-15 PROCEDURE — 82948 REAGENT STRIP/BLOOD GLUCOSE: CPT

## 2023-05-15 PROCEDURE — 6360000002 HC RX W HCPCS: Performed by: THORACIC SURGERY (CARDIOTHORACIC VASCULAR SURGERY)

## 2023-05-15 PROCEDURE — 36415 COLL VENOUS BLD VENIPUNCTURE: CPT

## 2023-05-15 PROCEDURE — 97110 THERAPEUTIC EXERCISES: CPT

## 2023-05-15 PROCEDURE — 6370000000 HC RX 637 (ALT 250 FOR IP): Performed by: THORACIC SURGERY (CARDIOTHORACIC VASCULAR SURGERY)

## 2023-05-15 PROCEDURE — 71045 X-RAY EXAM CHEST 1 VIEW: CPT

## 2023-05-15 PROCEDURE — 80048 BASIC METABOLIC PNL TOTAL CA: CPT

## 2023-05-15 PROCEDURE — 2060000000 HC ICU INTERMEDIATE R&B

## 2023-05-15 PROCEDURE — 2580000003 HC RX 258: Performed by: THORACIC SURGERY (CARDIOTHORACIC VASCULAR SURGERY)

## 2023-05-15 PROCEDURE — 85025 COMPLETE CBC W/AUTO DIFF WBC: CPT

## 2023-05-15 PROCEDURE — 97530 THERAPEUTIC ACTIVITIES: CPT

## 2023-05-15 PROCEDURE — 97116 GAIT TRAINING THERAPY: CPT

## 2023-05-15 RX ADMIN — ASPIRIN 325 MG: 325 TABLET, COATED ORAL at 09:23

## 2023-05-15 RX ADMIN — FUROSEMIDE 20 MG: 10 INJECTION, SOLUTION INTRAMUSCULAR; INTRAVENOUS at 09:24

## 2023-05-15 RX ADMIN — SODIUM CHLORIDE, PRESERVATIVE FREE 20 ML: 5 INJECTION INTRAVENOUS at 09:25

## 2023-05-15 RX ADMIN — FAMOTIDINE 20 MG: 20 TABLET ORAL at 09:23

## 2023-05-15 RX ADMIN — ACETAMINOPHEN 650 MG: 325 TABLET ORAL at 20:21

## 2023-05-15 RX ADMIN — FAMOTIDINE 20 MG: 20 TABLET ORAL at 20:21

## 2023-05-15 RX ADMIN — SENNOSIDES AND DOCUSATE SODIUM 1 TABLET: 50; 8.6 TABLET ORAL at 20:21

## 2023-05-15 RX ADMIN — AMIODARONE HYDROCHLORIDE 200 MG: 200 TABLET ORAL at 20:20

## 2023-05-15 RX ADMIN — SENNOSIDES AND DOCUSATE SODIUM 1 TABLET: 50; 8.6 TABLET ORAL at 09:23

## 2023-05-15 RX ADMIN — AMIODARONE HYDROCHLORIDE 200 MG: 200 TABLET ORAL at 09:24

## 2023-05-15 RX ADMIN — SODIUM CHLORIDE, PRESERVATIVE FREE 10 ML: 5 INJECTION INTRAVENOUS at 20:24

## 2023-05-15 RX ADMIN — LEVOTHYROXINE SODIUM 125 MCG: 0.12 TABLET ORAL at 06:57

## 2023-05-15 RX ADMIN — ATORVASTATIN CALCIUM 20 MG: 20 TABLET, FILM COATED ORAL at 20:20

## 2023-05-15 RX ADMIN — FUROSEMIDE 20 MG: 10 INJECTION, SOLUTION INTRAMUSCULAR; INTRAVENOUS at 20:33

## 2023-05-15 RX ADMIN — Medication 1 TABLET: at 09:24

## 2023-05-15 ASSESSMENT — PAIN DESCRIPTION - PAIN TYPE: TYPE: SURGICAL PAIN

## 2023-05-15 ASSESSMENT — PAIN - FUNCTIONAL ASSESSMENT
PAIN_FUNCTIONAL_ASSESSMENT: PREVENTS OR INTERFERES WITH MANY ACTIVE NOT PASSIVE ACTIVITIES
PAIN_FUNCTIONAL_ASSESSMENT: PREVENTS OR INTERFERES SOME ACTIVE ACTIVITIES AND ADLS

## 2023-05-15 ASSESSMENT — PAIN DESCRIPTION - DESCRIPTORS
DESCRIPTORS: ACHING
DESCRIPTORS: ACHING

## 2023-05-15 ASSESSMENT — PAIN DESCRIPTION - LOCATION
LOCATION: INCISION
LOCATION: STERNUM

## 2023-05-15 ASSESSMENT — PAIN SCALES - GENERAL
PAINLEVEL_OUTOF10: 2
PAINLEVEL_OUTOF10: 1
PAINLEVEL_OUTOF10: 2

## 2023-05-15 ASSESSMENT — PAIN DESCRIPTION - FREQUENCY: FREQUENCY: INTERMITTENT

## 2023-05-15 ASSESSMENT — PAIN DESCRIPTION - ONSET: ONSET: AWAKENED FROM SLEEP

## 2023-05-15 ASSESSMENT — PAIN DESCRIPTION - ORIENTATION: ORIENTATION: MID

## 2023-05-15 NOTE — CARE COORDINATION
5/15/23, 11:04 AM EDT    DISCHARGE PLANNING EVALUATION     [facility]

## 2023-05-15 NOTE — CARE COORDINATION
DISCHARGE PLANNING EVALUATION  5/15/23, 10:33 AM EDT    Reason for Referral: Discharge planning post OHS  Mental Status: Alert and oriented. Decision Making: Makes own decisions. Family/Social/Home Environment: Assessment completed with pt, states he resides with his wife and daughter and all are independent at home. Pt drives and was not using dme pta. Pt states he would like a walker at discharge, the one at home is his wife's and it is too big. Current Services including food security, transportation and housekeeping:  See note above. Current Equipment: None reported. Payment Source: [facility] Medicare  Concerns or Barriers to Discharge: None reported. [facility]) provider list was provided to patient. Patient was informed of their freedom to choose [facility] provider. Discussed and offered to show the patient the relevant [facility] Providers quality and resource use measures on Medicare Compare web site via computer based on patient's goals of care and treatment preferences. Questions regarding selection process were answered. Teach Back Method used with pt regarding care plan and discharge planning. Patient verbalized understanding of the plan of care and contribute to goal setting. Patient goals, treatment preferences and discharge plan: planning home tomorrow with family and [facility] for RN. Kimberly CM aware of pts request for walker. SW called [facility] and left referral on voicemail.     Electronically signed by COLTEN North on 5/15/2023 at 10:33 AM

## 2023-05-15 NOTE — CARE COORDINATION
5/15/23, 3:58 PM EDT    DISCHARGE ON GOING [facility] day: 3  Location: [facility]18/018-A Reason for admit: Coronary artery disease, unspecified vessel or lesion type, unspecified whether angina present, unspecified whether native or transplanted heart [I25.10]  CAD in native artery [I25.10]   Procedure:   5/12 2v CABG  5/12 Intubated - 5/12 extubated  5/15 Chest tubes removed  5/15 CXR: Interval removal of [place] catheter. Otherwise stable appearance    Barriers to Discharge: POD #3. Chest tubes removed today. No BM yet. Afebrile. Apaced. On room air. Ox4. CR/PT/OT. Dietitian consulted. Telemetry, I&O, daily weight, IS, sternal precautions, wound care, SCDs, ho care, ambulate. Amio, asa, lipitor, pepcid, IV lasix 20 mg bid, SSI, synthroid, prn roxicodone, senokot, electrolyte replacement protocols. Hgb up to 8.3, plt up to 101. PCP: KATIE Giron CNP  Readmission Risk Score: 12.1%  Patient Goals/Plan/Treatment Preferences: Home with wife and [facility] [place] [facility]. Needs walker. SW on case.

## 2023-05-16 ENCOUNTER — APPOINTMENT (OUTPATIENT)
Dept: GENERAL RADIOLOGY | Age: 68
DRG: 236 | End: 2023-05-16
Attending: THORACIC SURGERY (CARDIOTHORACIC VASCULAR SURGERY)
Payer: MEDICARE

## 2023-05-16 LAB
ANION GAP SERPL CALC-SCNC: 13 MEQ/L (ref 8–16)
BUN SERPL-MCNC: 20 MG/DL (ref 7–22)
CALCIUM SERPL-MCNC: 7.8 MG/DL (ref 8.5–10.5)
CHLORIDE SERPL-SCNC: 105 MEQ/L (ref 98–111)
CO2 SERPL-SCNC: 24 MEQ/L (ref 23–33)
CREAT SERPL-MCNC: 1 MG/DL (ref 0.4–1.2)
DEPRECATED RDW RBC AUTO: 52.3 FL (ref 35–45)
ERYTHROCYTE [DISTWIDTH] IN BLOOD BY AUTOMATED COUNT: 16.2 % (ref 11.5–14.5)
GFR SERPL CREATININE-BSD FRML MDRD: > 60 ML/MIN/1.73M2
GLUCOSE BLD STRIP.AUTO-MCNC: 109 MG/DL (ref 70–108)
GLUCOSE BLD STRIP.AUTO-MCNC: 109 MG/DL (ref 70–108)
GLUCOSE BLD STRIP.AUTO-MCNC: 113 MG/DL (ref 70–108)
GLUCOSE BLD STRIP.AUTO-MCNC: 115 MG/DL (ref 70–108)
GLUCOSE BLD STRIP.AUTO-MCNC: 117 MG/DL (ref 70–108)
GLUCOSE SERPL-MCNC: 107 MG/DL (ref 70–108)
HCT VFR BLD AUTO: 25.8 % (ref 42–52)
HGB BLD-MCNC: 8.2 GM/DL (ref 14–18)
MCH RBC QN AUTO: 28.1 PG (ref 26–33)
MCHC RBC AUTO-ENTMCNC: 31.8 GM/DL (ref 32.2–35.5)
MCV RBC AUTO: 88.4 FL (ref 80–94)
PLATELET # BLD AUTO: 141 THOU/MM3 (ref 130–400)
PMV BLD AUTO: 11.2 FL (ref 9.4–12.4)
POTASSIUM SERPL-SCNC: 3.9 MEQ/L (ref 3.5–5.2)
RBC # BLD AUTO: 2.92 MILL/MM3 (ref 4.7–6.1)
SODIUM SERPL-SCNC: 142 MEQ/L (ref 135–145)
WBC # BLD AUTO: 7.2 THOU/MM3 (ref 4.8–10.8)

## 2023-05-16 PROCEDURE — 6370000000 HC RX 637 (ALT 250 FOR IP): Performed by: THORACIC SURGERY (CARDIOTHORACIC VASCULAR SURGERY)

## 2023-05-16 PROCEDURE — 97116 GAIT TRAINING THERAPY: CPT

## 2023-05-16 PROCEDURE — 36415 COLL VENOUS BLD VENIPUNCTURE: CPT

## 2023-05-16 PROCEDURE — 97530 THERAPEUTIC ACTIVITIES: CPT

## 2023-05-16 PROCEDURE — APPSS30 APP SPLIT SHARED TIME 16-30 MINUTES: Performed by: PHYSICIAN ASSISTANT

## 2023-05-16 PROCEDURE — 6360000002 HC RX W HCPCS: Performed by: THORACIC SURGERY (CARDIOTHORACIC VASCULAR SURGERY)

## 2023-05-16 PROCEDURE — 2580000003 HC RX 258: Performed by: THORACIC SURGERY (CARDIOTHORACIC VASCULAR SURGERY)

## 2023-05-16 PROCEDURE — 85027 COMPLETE CBC AUTOMATED: CPT

## 2023-05-16 PROCEDURE — 97110 THERAPEUTIC EXERCISES: CPT

## 2023-05-16 PROCEDURE — 2060000000 HC ICU INTERMEDIATE R&B

## 2023-05-16 PROCEDURE — 80048 BASIC METABOLIC PNL TOTAL CA: CPT

## 2023-05-16 PROCEDURE — 71045 X-RAY EXAM CHEST 1 VIEW: CPT

## 2023-05-16 PROCEDURE — 82948 REAGENT STRIP/BLOOD GLUCOSE: CPT

## 2023-05-16 PROCEDURE — 97535 SELF CARE MNGMENT TRAINING: CPT

## 2023-05-16 RX ORDER — FUROSEMIDE 20 MG/1
20 TABLET ORAL DAILY
Qty: 30 TABLET | Refills: 0 | Status: SHIPPED | OUTPATIENT
Start: 2023-05-16 | End: 2023-05-17 | Stop reason: HOSPADM

## 2023-05-16 RX ORDER — MULTIVITAMIN WITH IRON
1 TABLET ORAL
Qty: 90 TABLET | Refills: 3 | Status: SHIPPED | OUTPATIENT
Start: 2023-05-17

## 2023-05-16 RX ORDER — AMIODARONE HYDROCHLORIDE 200 MG/1
200 TABLET ORAL 2 TIMES DAILY
Qty: 60 TABLET | Refills: 0 | Status: SHIPPED | OUTPATIENT
Start: 2023-05-16

## 2023-05-16 RX ORDER — POTASSIUM CHLORIDE 20 MEQ/1
20 TABLET, EXTENDED RELEASE ORAL DAILY
Qty: 30 TABLET | Refills: 0 | Status: SHIPPED | OUTPATIENT
Start: 2023-05-16 | End: 2023-05-17 | Stop reason: HOSPADM

## 2023-05-16 RX ORDER — OXYCODONE HYDROCHLORIDE 5 MG/1
5 TABLET ORAL EVERY 8 HOURS PRN
Qty: 21 TABLET | Refills: 0 | Status: SHIPPED | OUTPATIENT
Start: 2023-05-16 | End: 2023-05-23

## 2023-05-16 RX ADMIN — SODIUM CHLORIDE, PRESERVATIVE FREE 10 ML: 5 INJECTION INTRAVENOUS at 07:57

## 2023-05-16 RX ADMIN — ATORVASTATIN CALCIUM 20 MG: 20 TABLET, FILM COATED ORAL at 20:46

## 2023-05-16 RX ADMIN — ACETAMINOPHEN 650 MG: 325 TABLET ORAL at 07:56

## 2023-05-16 RX ADMIN — FAMOTIDINE 20 MG: 20 TABLET ORAL at 07:56

## 2023-05-16 RX ADMIN — AMIODARONE HYDROCHLORIDE 200 MG: 200 TABLET ORAL at 20:46

## 2023-05-16 RX ADMIN — Medication 1 TABLET: at 07:56

## 2023-05-16 RX ADMIN — ACETAMINOPHEN 650 MG: 325 TABLET ORAL at 20:46

## 2023-05-16 RX ADMIN — SODIUM CHLORIDE, PRESERVATIVE FREE 10 ML: 5 INJECTION INTRAVENOUS at 08:05

## 2023-05-16 RX ADMIN — FAMOTIDINE 20 MG: 20 TABLET ORAL at 20:46

## 2023-05-16 RX ADMIN — FUROSEMIDE 20 MG: 10 INJECTION, SOLUTION INTRAMUSCULAR; INTRAVENOUS at 18:41

## 2023-05-16 RX ADMIN — FUROSEMIDE 20 MG: 10 INJECTION, SOLUTION INTRAMUSCULAR; INTRAVENOUS at 08:04

## 2023-05-16 RX ADMIN — SENNOSIDES AND DOCUSATE SODIUM 1 TABLET: 50; 8.6 TABLET ORAL at 07:56

## 2023-05-16 RX ADMIN — ASPIRIN 325 MG: 325 TABLET, COATED ORAL at 07:56

## 2023-05-16 RX ADMIN — LEVOTHYROXINE SODIUM 125 MCG: 0.12 TABLET ORAL at 06:06

## 2023-05-16 RX ADMIN — AMIODARONE HYDROCHLORIDE 200 MG: 200 TABLET ORAL at 07:56

## 2023-05-16 RX ADMIN — SODIUM CHLORIDE, PRESERVATIVE FREE 10 ML: 5 INJECTION INTRAVENOUS at 20:46

## 2023-05-16 ASSESSMENT — PAIN SCALES - GENERAL
PAINLEVEL_OUTOF10: 2
PAINLEVEL_OUTOF10: 2

## 2023-05-16 ASSESSMENT — PAIN DESCRIPTION - ORIENTATION
ORIENTATION: MID
ORIENTATION: MID

## 2023-05-16 ASSESSMENT — PAIN DESCRIPTION - ONSET
ONSET: ON-GOING
ONSET: ON-GOING

## 2023-05-16 ASSESSMENT — PAIN DESCRIPTION - DESCRIPTORS
DESCRIPTORS: ACHING;SORE
DESCRIPTORS: ACHING

## 2023-05-16 ASSESSMENT — PAIN DESCRIPTION - LOCATION
LOCATION: STERNUM
LOCATION: STERNUM

## 2023-05-16 ASSESSMENT — PAIN DESCRIPTION - FREQUENCY
FREQUENCY: CONTINUOUS
FREQUENCY: CONTINUOUS

## 2023-05-16 ASSESSMENT — PAIN - FUNCTIONAL ASSESSMENT
PAIN_FUNCTIONAL_ASSESSMENT: ACTIVITIES ARE NOT PREVENTED
PAIN_FUNCTIONAL_ASSESSMENT: PREVENTS OR INTERFERES SOME ACTIVE ACTIVITIES AND ADLS

## 2023-05-16 ASSESSMENT — PAIN DESCRIPTION - PAIN TYPE
TYPE: SURGICAL PAIN
TYPE: SURGICAL PAIN

## 2023-05-16 NOTE — CARE COORDINATION
5/16/23, 8:42 AM EDT    DISCHARGE PLANNING EVALUATION     Planning discharge today, alka notified Jaki at [address].

## 2023-05-16 NOTE — FLOWSHEET NOTE
Virtual RN safety rounds completed. Pt lying in bed sleeping soundly, camera on for safety, call light in reach.

## 2023-05-16 NOTE — DISCHARGE SUMMARY
CT/CV Surgery Discharge Summary     Pt Name: Antonia Delgadillo  MRN: [id number]  [place]: 1955  Primary Care Physician: KATIE Cruz CNP    Admit date:  5/12/2023  5:42 AM     Discharge date:  05/16/23     Disposition: Home    Admitting Diagnosis: Coronary artery disease    Discharge Diagnosis: Coronary artery disease    Condition: Stable    Problem List:   Patient Active Problem List   Diagnosis Code    Tinnitus H93.19    Hearing loss d/t noise H83.3X9    Essential hypertension I10    Impaired fasting blood sugar R73.01    Primary osteoarthritis of left knee M17.12    Liver disease K76.9    Orthostatic hypotension I95.1    S/P angioplasty with stent of the LM to LCX with 100% lad lesion and mod rca lesion- med RX 06/04/2018 Z95.820    Coronary artery disease involving native coronary artery of native heart without angina pectoris I25.10    Non-ST elevation (NSTEMI) myocardial infarction (HCC) I21.4    Gait instability R26.81    Left-sided weakness R53.1    Otorrhagia of left ear H92.22    Hyperlipidemia E78.5    History of thyroid cancer Z85.850    Chronic nonintractable headache R51.9, G89.29    Chest pain on exertion and cold exposure R07.9    SOB (shortness of breath) on exertion and cold exposure R06.02    Coronary artery disease involving native coronary artery of native heart I25.10    Abnormal EKG R94.31    Cardiomyopathy, ischemic I25.5    Abnormal nuclear stress test R94.39    Chest pain R07.9    S/P cardiac cath EDp 11 mmhg, LM TO LCX- STENT PATENT, OM1 OSTIAL 60% STENOSIS, LAD PROX 100%, RCA PROX /OSTIAL 100%, COLLATERAL FROM LCX TO LAD AND RCA-CABG CONSIDERED Z98.890    CAD, multiple vessel I25.10       Procedures:  5/12/23   1) Coronary artery bypass grafting x 2, with the left internal mammary artery grafted to the left anterior descending artery, a reversed greater saphenous aortocoronary vein graft to the first obtuse marginal coronary artery  2) Endoscopic greater saphenous vein

## 2023-05-16 NOTE — DISCHARGE INSTRUCTIONS
Discharge Instructions Following Cardiac Surgery for  [facility]  Pt Name: Dao Jama  Medical Record Number: [id number]  Today's Date: 5/16/2023    ACTIVITY/EXERCISE   ? It will take 2 to 3 months to feel like you did before surgery in terms of energy and strength. ?    Slowly increase your activity after you go home. Pace yourself, listen to your body. If it hurts, stop. During the first 2 months, no digging, outside bike riding, or using arm movement on  a stationary bike for sternal precautions. ?   The limit on how much you can lift, push or pull is 10 pounds. For the first 4 weeks after surgery, you must not lift anything over 10 pounds. (You cannot lift anything heavier than a gallon of  milk). Beginning the 5th week after surgery, you may lift, push or pull up to 20 pounds. ? Begin your walking program on the first day you are home and record how many times per day and number of minutes on your log. You may climb stairs; there are no limits to stair climbing. ? Continue to do your cough and deep breathing exercises and the incentive spirometer 6 times a day as you did in the hospital.   ?    Do not use arms to get up from a seated position. Instead, rock in your chair to give yourself momentum to sit up.   ?    You may begin light house hold chores, washing a few dishes, dusting, setting the table or folding laundry. ?    You can have sex when it is comfortable for you. The amount of stress on the heart during sex is about the same as climbing 2 flights of stairs. APPETITE   ? Your appetite might be decreased at first.  It should slowly improve. ? Small, frequent meals, as well as cold foods, may help. ?   The dietitian will assist you with a low fat, low cholesterol, low salt diet. ? Consult your open heart binder for diet instructions. TEMPERATURE   ? Take your temperature at the same time each day.   Take your

## 2023-05-16 NOTE — CARE COORDINATION
He Barrios was evaluated today and a DME order was entered for a wheeled walker because he requires this to successfully complete daily living tasks of ambulating. A wheeled walker is necessary due to the patient's unsteady gait, upper body weakness, and inability to  an ambulation device; and he can ambulate only by pushing a walker instead of a lesser assistive device such as a cane, crutch, or standard walker. The need for this equipment was discussed with the patient and he understands and is in agreement.

## 2023-05-17 ENCOUNTER — APPOINTMENT (OUTPATIENT)
Dept: GENERAL RADIOLOGY | Age: 68
DRG: 236 | End: 2023-05-17
Attending: THORACIC SURGERY (CARDIOTHORACIC VASCULAR SURGERY)
Payer: MEDICARE

## 2023-05-17 VITALS
DIASTOLIC BLOOD PRESSURE: 78 MMHG | SYSTOLIC BLOOD PRESSURE: 150 MMHG | HEART RATE: 84 BPM | WEIGHT: 171.7 LBS | OXYGEN SATURATION: 93 % | HEIGHT: 69 IN | TEMPERATURE: 98.2 F | RESPIRATION RATE: 20 BRPM | BODY MASS INDEX: 25.43 KG/M2

## 2023-05-17 LAB
ANION GAP SERPL CALC-SCNC: 11 MEQ/L (ref 8–16)
BUN SERPL-MCNC: 14 MG/DL (ref 7–22)
CALCIUM SERPL-MCNC: 7.9 MG/DL (ref 8.5–10.5)
CHLORIDE SERPL-SCNC: 105 MEQ/L (ref 98–111)
CO2 SERPL-SCNC: 25 MEQ/L (ref 23–33)
CREAT SERPL-MCNC: 1.1 MG/DL (ref 0.4–1.2)
DEPRECATED RDW RBC AUTO: 52.6 FL (ref 35–45)
ERYTHROCYTE [DISTWIDTH] IN BLOOD BY AUTOMATED COUNT: 16.3 % (ref 11.5–14.5)
GFR SERPL CREATININE-BSD FRML MDRD: > 60 ML/MIN/1.73M2
GLUCOSE BLD STRIP.AUTO-MCNC: 101 MG/DL (ref 70–108)
GLUCOSE SERPL-MCNC: 103 MG/DL (ref 70–108)
HCT VFR BLD AUTO: 27.2 % (ref 42–52)
HGB BLD-MCNC: 8.6 GM/DL (ref 14–18)
MCH RBC QN AUTO: 28 PG (ref 26–33)
MCHC RBC AUTO-ENTMCNC: 31.6 GM/DL (ref 32.2–35.5)
MCV RBC AUTO: 88.6 FL (ref 80–94)
PLATELET # BLD AUTO: 183 THOU/MM3 (ref 130–400)
PMV BLD AUTO: 10.5 FL (ref 9.4–12.4)
POTASSIUM SERPL-SCNC: 3.8 MEQ/L (ref 3.5–5.2)
RBC # BLD AUTO: 3.07 MILL/MM3 (ref 4.7–6.1)
SODIUM SERPL-SCNC: 141 MEQ/L (ref 135–145)
WBC # BLD AUTO: 5.7 THOU/MM3 (ref 4.8–10.8)

## 2023-05-17 PROCEDURE — 6370000000 HC RX 637 (ALT 250 FOR IP): Performed by: THORACIC SURGERY (CARDIOTHORACIC VASCULAR SURGERY)

## 2023-05-17 PROCEDURE — 80048 BASIC METABOLIC PNL TOTAL CA: CPT

## 2023-05-17 PROCEDURE — APPSS60 APP SPLIT SHARED TIME 46-60 MINUTES: Performed by: PHYSICIAN ASSISTANT

## 2023-05-17 PROCEDURE — 36415 COLL VENOUS BLD VENIPUNCTURE: CPT

## 2023-05-17 PROCEDURE — 71045 X-RAY EXAM CHEST 1 VIEW: CPT

## 2023-05-17 PROCEDURE — 85027 COMPLETE CBC AUTOMATED: CPT

## 2023-05-17 PROCEDURE — 82948 REAGENT STRIP/BLOOD GLUCOSE: CPT

## 2023-05-17 RX ORDER — ASPIRIN 81 MG/1
81 TABLET ORAL DAILY
Qty: 90 TABLET | Refills: 1 | Status: SHIPPED | OUTPATIENT
Start: 2023-05-17

## 2023-05-17 RX ORDER — LISINOPRIL 2.5 MG/1
2.5 TABLET ORAL DAILY
Qty: 30 TABLET | Refills: 3 | Status: SHIPPED | OUTPATIENT
Start: 2023-05-17

## 2023-05-17 RX ADMIN — LEVOTHYROXINE SODIUM 125 MCG: 0.12 TABLET ORAL at 06:32

## 2023-05-17 NOTE — PLAN OF CARE
Problem: Discharge Planning  Goal: Discharge to home or other facility with appropriate resources  Outcome: Progressing  Flowsheets (Taken 5/14/2023 2023)  Discharge to home or other facility with appropriate resources:   Arrange for interpreters to assist at discharge as needed   Identify discharge learning needs (meds, wound care, etc)   Arrange for needed discharge resources and transportation as appropriate   Identify barriers to discharge with patient and caregiver     Problem: Chronic Conditions and Co-morbidities  Goal: Patient's chronic conditions and co-morbidity symptoms are monitored and maintained or improved  Outcome: Progressing  Flowsheets (Taken 5/14/2023 2023)  Care Plan - Patient's Chronic Conditions and Co-Morbidity Symptoms are Monitored and Maintained or Improved:   Monitor and assess patient's chronic conditions and comorbid symptoms for stability, deterioration, or improvement   Collaborate with multidisciplinary team to address chronic and comorbid conditions and prevent exacerbation or deterioration   Update acute care plan with appropriate goals if chronic or comorbid symptoms are exacerbated and prevent overall improvement and discharge     Problem: Safety - Adult  Goal: Free from fall injury  Outcome: Progressing     Problem: Nutrition Deficit:  Goal: Optimize nutritional status  Outcome: Progressing     Problem: Respiratory - Adult  Goal: Achieves optimal ventilation and oxygenation  Outcome: Progressing  Flowsheets (Taken 5/14/2023 2023)  Achieves optimal ventilation and oxygenation:   Assess for changes in respiratory status   Assess for changes in mentation and behavior   Oxygen supplementation based on oxygen saturation or arterial blood gases   Position to facilitate oxygenation and minimize respiratory effort     Problem: Cardiovascular - Adult  Goal: Maintains optimal cardiac output and hemodynamic stability  Outcome: Progressing  Flowsheets (Taken 5/14/2023 2023)  Maintains
Problem: Discharge Planning  Goal: Discharge to home or other facility with appropriate resources  Recent Flowsheet Documentation  Taken 5/16/2023 2030 by Michael Giarldo RN  Discharge to home or other facility with appropriate resources:   Arrange for needed discharge resources and transportation as appropriate   Identify barriers to discharge with patient and caregiver   Identify discharge learning needs (meds, wound care, etc)   Refer to discharge planning if patient needs post-hospital services based on physician order or complex needs related to functional status, cognitive ability or social support system     Problem: Chronic Conditions and Co-morbidities  Goal: Patient's chronic conditions and co-morbidity symptoms are monitored and maintained or improved  Recent Flowsheet Documentation  Taken 5/16/2023 2030 by Michael Giraldo RN  Care Plan - Patient's Chronic Conditions and Co-Morbidity Symptoms are Monitored and Maintained or Improved:   Monitor and assess patient's chronic conditions and comorbid symptoms for stability, deterioration, or improvement   Collaborate with multidisciplinary team to address chronic and comorbid conditions and prevent exacerbation or deterioration   Update acute care plan with appropriate goals if chronic or comorbid symptoms are exacerbated and prevent overall improvement and discharge     Problem: Respiratory - Adult  Goal: Achieves optimal ventilation and oxygenation  Recent Flowsheet Documentation  Taken 5/16/2023 2030 by Michael Giraldo RN  Achieves optimal ventilation and oxygenation:   Assess for changes in respiratory status   Assess for changes in mentation and behavior   Assess the need for suctioning and aspirate as needed   Respiratory therapy support as indicated     Problem: Cardiovascular - Adult  Goal: Maintains optimal cardiac output and hemodynamic stability  Recent Flowsheet Documentation  Taken 5/16/2023 2030 by Michael Giraldo RN  Maintains optimal cardiac
volume expanders as ordered  Goal: Absence of cardiac dysrhythmias or at baseline  Recent Flowsheet Documentation  Taken 5/15/2023 1943 by Reggie Plascencia RN  Absence of cardiac dysrhythmias or at baseline:   Assess for signs of decreased cardiac output   Monitor cardiac rate and rhythm   Administer antiarrhythmia medication and electrolyte replacement as ordered     Problem: Skin/Tissue Integrity - Adult  Goal: Incisions, wounds, or drain sites healing without S/S of infection  Recent Flowsheet Documentation  Taken 5/15/2023 1943 by Reggie Plascencia RN  Incisions, Wounds, or Drain Sites Healing Without Sign and Symptoms of Infection:   TWICE DAILY: Assess and document skin integrity   ADMISSION and DAILY: Assess and document risk factors for pressure ulcer development   TWICE DAILY: Assess and document dressing/incision, wound bed, drain sites and surrounding tissue     Problem: Genitourinary - Adult  Goal: Urinary catheter remains patent  Recent Flowsheet Documentation  Taken 5/15/2023 1943 by Reggie Plascencia RN  Urinary catheter remains patent:   Assess patency of urinary catheter   Assess need for a larger catheter size or a 3-way catheter for continuous bladder irrigation   Irrigate catheter per Licensed Independent Practitioner order if indicated and notify Licensed Independent Practitioner if unable to irrigate     Problem: Infection - Adult  Goal: Absence of infection at discharge  Recent Flowsheet Documentation  Taken 5/15/2023 1943 by Reggie Plascencia RN  Absence of infection at discharge:   Assess and monitor for signs and symptoms of infection   Monitor lab/diagnostic results   Monitor all insertion sites i.e., indwelling lines, tubes and drains  Goal: Absence of infection during hospitalization  Recent Flowsheet Documentation  Taken 5/15/2023 1943 by Reggie Plascencia RN  Absence of infection during hospitalization:   Assess and monitor for signs and symptoms of infection   Monitor lab/diagnostic results

## 2023-05-17 NOTE — CARE COORDINATION
05/17/23 8:20 AM    Called [facility] and spoke with Delmi Holloway; reported need walker for patient, discharging today.

## 2023-05-17 NOTE — PROGRESS NOTES
[facility] ICU STEPDOWN TELEMETRY 4K  Occupational Therapy  Daily Note  Time:   Time In: 813  Time Out: 4647  Timed Code Treatment Minutes: 26 Minutes  Minutes: 26          Date: 2023  Patient Name: Candy Chaves,   Gender: male      Room: [facility]18/018-A  MRN: [id number]  : 1955  (76 y.o.)  Referring Practitioner: Dr. Idalmis Odom MD  Diagnosis: Coronary Artery Disease  Additional Pertinent Hx: Pt s/p PCI to left main and circumflex coronary arteries in 2018 at [facility] for myocardial infarction following thyroidectomy, presents with stable angina consisting of exercise- and cold-exposure-induced non-radiating left parasternal chest pain, relieved by rest, along with chronic progressive fatigue. Denies orthopnea or presyncope. Underwent LHC today showing open stents, but LAD and RCA , with distal vessels filling from posterior collaterals. Pt is S/P CABG x 2 on 23. Restrictions/Precautions:  Restrictions/Precautions: Fall Risk  Position Activity Restriction  Other position/activity restrictions: Move in the Tube, Cardiac Bear     SUBJECTIVE: Pt seated in bedside chair upon arrival, agreeable to OT session. PAIN: No c/o. Vitals: Oxygen & Heart Rate: WNL following mobility. COGNITION: Decreased Problem Solving and Decreased Safety Awareness    ADL:   Feeding: Modified Independent. For breakfast tray  Grooming: Stand By Assistance. Standing sink side washing hands. Toileting: Minimal Assistance. For thoroughness of hygiene. Toilet Transfer: Stand By Assistance. STS . BALANCE:  Sitting Balance:  Modified Independent. Bedside chair  Standing Balance: Stand By Assistance. X1-2 minutes within ADL    BED MOBILITY:  Not Tested    TRANSFERS:  Sit to Stand:  Stand By Assistance. Stand to Sit: Stand By Assistance. Comment: Mod cues for hand placement to/from bedside chair    FUNCTIONAL MOBILITY:  Assistive Device: Rolling Walker   Assist Level:  Stand By Assistance.
[facility] ICU STEPDOWN TELEMETRY 4K  Occupational Therapy  Daily Note  Time:   Time In: 0740  Time Out: 09  Timed Code Treatment Minutes: 28 Minutes  Minutes: 28          Date: 5/15/2023  Patient Name: Antonia Delgadillo,   Gender: male      Room: [facility]18/018-A  MRN: [id number]  : 1955  (76 y.o.)  Referring Practitioner: Dr. Don Bhat MD  Diagnosis: Coronary Artery Disease  Additional Pertinent Hx: Pt s/p PCI to left main and circumflex coronary arteries in 2018 at [facility], for myocardial infarction following thyroidectomy, presents with stable angina consisting of exercise- and cold-exposure-induced non-radiating left parasternal chest pain, relieved by rest, along with chronic progressive fatigue. Denies orthopnea or presyncope. Underwent LHC today showing open stents, but LAD and RCA , with distal vessels filling from posterior collaterals. Pt is S/P CABG x 2 on 23. Restrictions/Precautions:  Restrictions/Precautions: Fall Risk  Position Activity Restriction  Other position/activity restrictions: Move in the Tube, Cardiac Bear     SUBJECTIVE: Pt sitting in the recliner upon arrival, pt agreeable to OT session, RN Gave verbal approval for session, pt's wife present during session     PAIN: 1/10: sternum     Vitals: Oxygen: 99% on 2L, with pt then placed on RA, and able to maintain 90% with Rubi PA aware   Heart Rate: 88-90    COGNITION: Impulsive    ADL:   No ADL's completed this session. Neena Babin BALANCE:  Standing Balance: Contact Guard Assistance. With RW, in anticipation with pt noted to be impulsive with BUE support on the walker with pt standing for 1-2 minutes    BED MOBILITY:  Sit to Supine: Minimal Assistance with bringing BLE back into bed, with vc's for impulsiveness due to pt attempting to sit prematurely     TRANSFERS:  Sit to Stand:  Contact Guard Assistance. From the recliner  Stand to Sit: [address]  Back to the EOB    FUNCTIONAL MOBILITY:  Assistive
CLINICAL PHARMACY: DISCHARGE MED RECONCILIATION/REVIEW    [facility]) Select Patient?: No  Total # of Interventions Recommended: 0   -   Total # Interventions Accepted: 0  Intervention Severity:   - Level 1 Intervention Present?: No   - Level 2 #: 0   - Level 3 #: 0   Time Spent (min): 30    Additional Documentation: Patient education provided on new medications.      Stacie Dunham PharmD  10:10 AM  5/17/2023
CLINICAL PHARMACY: DISCHARGE MED RECONCILIATION/REVIEW    [facility] Select Patient?: No  Total # of Interventions Recommended: 0   -   Total # Interventions Accepted: 0  Intervention Severity:   - Level 1 Intervention Present?: No   - Level 2 #: 0   - Level 3 #: 0   Time Spent (min): 30    Additional Documentation:    Deanne PeraltaD, BCPS  5/16/2023  12:02 PM
CT/CV Surgery Progress Note    5/15/2023 7:42 AM  Surgeon:  Dr. Raeann Darden:  Mr. Dirk Amaya is s/p CABGx2 POD # 3. He is resting comfortably in chair on 2L NC O2 sat 99%, alert, and in no acute distress. Pt denies chest pressure, SOB, fever,chills, N/V/D. Chest tube output 40cc past shift, 170cc past 24 hrs. Vital Signs: /85   Pulse 78   Temp 98 °F (36.7 °C) (Oral)   Resp 20   Ht 5' 9\" (1.753 m)   Wt 180 lb 14.4 oz (82.1 kg)   SpO2 99%   BMI 26.71 kg/m²    Temp (24hrs), Av.6 °F (37 °C), Min:98 °F (36.7 °C), Max:99.7 °F (37.6 °C)      PULSE OXIMETRY RANGE: SpO2  Av.2 %  Min: 83 %  Max: 100 %     Labs:   CBC:     Recent Labs     23  1250 23  1750 23  0030 23  0600 23  1100 23  1800 23  0450   WBC 8.8  --  8.2 7.7  --   --  7.9   HGB 9.6*   < > 7.7* 7.2* 8.4* 7.9* 7.4*   HCT 29.5*   < > 24.3* 22.6* 26.1* 23.9* 22.7*   MCV 88.1  --  88.7 88.3  --   --  88.0   *  --  103* 99*  --   --  75*   INR 1.25*  --   --  1.33*  --   --  1.20*    < > = values in this interval not displayed. BMP:   Recent Labs     23  1250 23  1517 23  0600 23  0607 23  1100 23  1739 23  0450 23  0840 23     --  144  --   --   --  137  --   --    K 3.9   < > 4.3  --  5.1  --  4.6  --   --      --  110  --   --   --  107  --   --    CO2 20*  --  20*  --   --   --  23  --   --    BUN 21  --  22  --   --   --  25*  --   --    CREATININE 1.0  --  1.3*  --   --   --  1.2  --   --    MG 3.2*  --  2.5*  --   --   --  2.4  --   --    POCGLU  --    < >  --    < >  --    < >  --    < > 89    < > = values in this interval not displayed. Last HgA1C:   Lab Results   Component Value Date    LABA1C 6.0 05/10/2023       Imaging:  CXR: I have reviewed the CXR image. Findings:  Post CABG change. Subxiphoid and bilateral chest tubes remain in place. Interval removal of right IJ [place]-Suad catheter.
Inpatient Cardiac Rehabilitation Consult    Received consult for Phase II Cardiac Rehabilitation. Patient needs cardiac rehab due to CABG on 5/12/23. Importance of Cardiac Rehab discussed with patient. Reviewed cardiac rehab class times. Patient questions answered. We will contact patient at home to schedule evaluation appointment. Cardiac Rehab brochure given.
Marisela Barnes 60  OCCUPATIONAL THERAPY MISSED TREATMENT NOTE  [facility] ICU STEPDOWN TELEMETRY 4K  4K-18/018-A      Date: 2023  Patient Name: Ji Goodman        CSN: [id number]   : 1955  (76 y.o.)  Gender: male   Referring Practitioner: Dr. Markel Coronel MD  Diagnosis: Coronary Artery Disease         REASON FOR MISSED TREATMENT:  Pt with RN in prep for discharge home soon upon therapists arrival. Pt denied questions or concerns for home.
[facility]  INPATIENT PHYSICAL THERAPY  DAILY NOTE  [facility] ICU STEPDOWN TELEMETRY 4K - 4K-18/018-A    Time In: 3985  Time Out: 1220  Timed Code Treatment Minutes: 39 Minutes  Minutes: 41          Date: 2023  Patient Name: Christian Cook,  Gender:  male        MRN: [id number]  : 1955  (76 y.o.)  Referral Date : 23  Referring Practitioner: Janice Cuadra MD  Diagnosis: CAD, multiple vessel  Treatment Diagnosis: muscle weakness  Additional Pertinent Hx: Per EMR:68 y.o. male, s/p PCI to left main and circumflex coronary arteries in 2018 at [facility], for myocardial infarction following thyroidectomy, presents with stable angina consisting of exercise- and cold-exposure-induced non-radiating left parasternal chest pain, relieved by rest, along with chronic progressive fatigue. Patient s/p Coronary artery bypass grafting x 2, with the left internal mammary artery grafted to the left anterior descending artery, a reversed greater saphenous aortocoronary vein graft to the first obtuse marginal coronary artery on 23 with Dr. Wilkerson Has. Prior Level of Function:  Lives With: Spouse, Daughter  Type of Home: House  Home Layout: One level  Home Access: Ramped entrance  Home Equipment: Rollator, Cane, [employer]   Bathroom Shower/Tub: Walk-in shower  Bathroom Toilet: Handicap height  Bathroom Equipment: Shower chair, Grab bars in shower    ADL Assistance: Independent  Homemaking Responsibilities: Yes  Ambulation Assistance: Independent  Transfer Assistance: Independent  Active : No (drove prior to surgery)  Additional Comments: Pt states his wife works as full time , will take some time off when pt returns home. Pt was previously ambulates without AD. Stands to shower. Able to ambulate Community distances.     Restrictions/Precautions:  Restrictions/Precautions: Fall Risk  Position Activity Restriction  Other position/activity restrictions: Move in the Tube, Cardiac Bear
Patient discharged in stable condition via wheelchair. Daughter providing transportation, stopping to get medications at outpatient pharmacy. AVS printed and reviewed, all questions answered.
[facility]  INPATIENT PHYSICAL THERAPY  DAILY NOTE  [facility] ICU STEPDOWN TELEMETRY 4K - 4K-18/018-A    Time In:   Time Out: 1147  Timed Code Treatment Minutes: 29 Minutes  Minutes: 29          Date: 5/15/2023  Patient Name: Marga Reyes,  Gender:  male        MRN: [id number]  : 1955  (76 y.o.)  Referral Date : 23  Referring Practitioner: Linus Atkins MD  Diagnosis: CAD, multiple vessel  Treatment Diagnosis: muscle weakness  Additional Pertinent Hx: Per EMR:68 y.o. male, s/p PCI to left main and circumflex coronary arteries in 2018 at [facility], for myocardial infarction following thyroidectomy, presents with stable angina consisting of exercise- and cold-exposure-induced non-radiating left parasternal chest pain, relieved by rest, along with chronic progressive fatigue. Patient s/p Coronary artery bypass grafting x 2, with the left internal mammary artery grafted to the left anterior descending artery, a reversed greater saphenous aortocoronary vein graft to the first obtuse marginal coronary artery on 23 with Dr. Marleny Red. Prior Level of Function:  Lives With: Spouse, Daughter  Type of Home: House  Home Layout: One level  Home Access: Ramped entrance  Home Equipment: Rollator, Cane, [employer]   Bathroom Shower/Tub: Walk-in shower  Bathroom Toilet: Handicap height  Bathroom Equipment: Shower chair, Grab bars in shower    ADL Assistance: Independent  Homemaking Responsibilities: Yes  Ambulation Assistance: Independent  Transfer Assistance: Independent  Active : No (drove prior to surgery)  Additional Comments: Pt states his wife works as full time , will take some time off when pt returns home. Pt was previously ambulates without AD. Stands to shower. Able to ambulate Community distances.     Restrictions/Precautions:  Restrictions/Precautions: Fall Risk  Position Activity Restriction  Other position/activity restrictions: Move in the Tube, Cardiac Bear
RX 06/04/2018    Coronary artery disease involving native coronary artery of native heart without angina pectoris    Non-ST elevation (NSTEMI) myocardial infarction [facility])    Gait instability    Left-sided weakness    Otorrhagia of left ear    Hyperlipidemia    History of thyroid cancer    Chronic nonintractable headache    Chest pain on exertion and cold exposure    SOB (shortness of breath) on exertion and cold exposure    Coronary artery disease involving native coronary artery of native heart    Abnormal EKG    Cardiomyopathy, ischemic    Abnormal nuclear stress test    Chest pain    S/P cardiac cath EDp 11 mmhg, LM TO LCX- STENT PATENT, OM1 OSTIAL 60% STENOSIS, LAD PROX 100%, RCA PROX /OSTIAL 100%, COLLATERAL FROM LCX TO LAD AND RCA-CABG CONSIDERED    CAD, multiple vessel     Plan:   CXR reviewed- Continue daily CXR's   Continue medical therapy  Progress with PT    The plan of care was discussed in detail with Dr. Cristian Mcintyre PA-C

## 2023-05-17 NOTE — DISCHARGE SUMMARY
CT/CV Surgery Discharge Summary     Pt Name: Lorenzo Monzon  MRN: [id number]  [place]: 1955  Primary Care Physician: KATIE Sharpe CNP    Admit date:  5/12/2023  5:42 AM     Discharge date:  05/17/23     Disposition: Home    Admitting Diagnosis: Coronary artery disease    Discharge Diagnosis: Coronary artery disease    Condition: Stable    Problem List:   Patient Active Problem List   Diagnosis Code    Tinnitus H93.19    Hearing loss d/t noise H83.3X9    Essential hypertension I10    Impaired fasting blood sugar R73.01    Primary osteoarthritis of left knee M17.12    Liver disease K76.9    Orthostatic hypotension I95.1    S/P angioplasty with stent of the LM to LCX with 100% lad lesion and mod rca lesion- med RX 06/04/2018 Z95.820    Coronary artery disease involving native coronary artery of native heart without angina pectoris I25.10    Non-ST elevation (NSTEMI) myocardial infarction (HCC) I21.4    Gait instability R26.81    Left-sided weakness R53.1    Otorrhagia of left ear H92.22    Hyperlipidemia E78.5    History of thyroid cancer Z85.850    Chronic nonintractable headache R51.9, G89.29    Chest pain on exertion and cold exposure R07.9    SOB (shortness of breath) on exertion and cold exposure R06.02    Coronary artery disease involving native coronary artery of native heart I25.10    Abnormal EKG R94.31    Cardiomyopathy, ischemic I25.5    Abnormal nuclear stress test R94.39    Chest pain R07.9    S/P cardiac cath EDp 11 mmhg, LM TO LCX- STENT PATENT, OM1 OSTIAL 60% STENOSIS, LAD PROX 100%, RCA PROX /OSTIAL 100%, COLLATERAL FROM LCX TO LAD AND RCA-CABG CONSIDERED Z98.890    CAD, multiple vessel I25.10       Procedures:  5/12/23   1) Coronary artery bypass grafting x 2, with the left internal mammary artery grafted to the left anterior descending artery, a reversed greater saphenous aortocoronary vein graft to the first obtuse marginal coronary artery  2) Endoscopic greater saphenous vein

## 2023-05-17 NOTE — CARE COORDINATION
5/17/23, 9:25 AM EDT    Patient goals/plan/ treatment preferences discussed by  and . Patient goals/plan/ treatment preferences reviewed with patient/ family. Patient/ family verbalize understanding of discharge plan and are in agreement with goal/plan/treatment preferences. Understanding was demonstrated using the teach back method. AVS provided by RN at time of discharge, which includes all necessary medical information pertaining to the patients current course of illness, treatment, post-discharge goals of care, and treatment preferences. Services At/After Discharge: Home Health       IMM Letter  IMM Letter given to Patient/Family/Significant other/Guardian/POA/by[de-identified]  - Miguelina Goyal  IMM Letter date given[de-identified] 05/16/23  IMM Letter time given[de-identified] 46    Planning home with wife today and new [facility]. SW left message for [facility].

## 2023-05-18 ENCOUNTER — TELEPHONE (OUTPATIENT)
Dept: FAMILY MEDICINE CLINIC | Age: 68
End: 2023-05-18

## 2023-05-18 NOTE — TELEPHONE ENCOUNTER
Care Transitions Initial Follow Up Call    Outreach made within 2 business days of discharge: Yes    Patient: Christian Cook Patient : 1955   MRN: [id number]  Reason for Admission: There are no discharge diagnoses documented for the most recent discharge. Discharge Date: 23       Spoke with: attempted to contact pt, left vm.     Discharge department/facility: Psychiatric    TCM Interactive Patient Contact:      Scheduled appointment with PCP within 7-14 days    Follow Up  Future Appointments   Date Time Provider Jose Chou   2023 10:15 AM Damon Rodriguez MD N [facility] [place]   2023 11:15 AM Lakshmi Jesus PA-C N [facility] CT/CV [facility] - [address], CMA ([address] [place] [address])

## 2023-05-18 NOTE — TELEPHONE ENCOUNTER
Care Transitions Initial Follow Up Call    Outreach made within 2 business days of discharge: Yes    Patient: Lizandro Zimmerman Patient : 1955   MRN: [id number]  Reason for Admission: There are no discharge diagnoses documented for the most recent discharge. Discharge Date: 23       Spoke with: Pt    Discharge department/facility: [facility]     TCM Interactive Patient Contact:  Was patient able to fill all prescriptions: Yes  Was patient instructed to bring all medications to the follow-up visit: Yes  Is patient taking all medications as directed in the discharge summary?  Yes  Does patient understand their discharge instructions: Yes  Does patient have questions or concerns that need addressed prior to 7-14 day follow up office visit: no    Scheduled appointment with PCP within 7-14 days    Follow Up  Future Appointments   Date Time Provider Jose Chou   2023 10:15 AM Chas Myrick MD N [facility] Heart [facility]   2023 11:15 AM MAX Epps CT/CV [facility] - [address], [address] [place] [address] Ave ([address] [place] [address])

## 2023-05-19 ENCOUNTER — OFFICE VISIT (OUTPATIENT)
Dept: FAMILY MEDICINE CLINIC | Age: 68
End: 2023-05-19

## 2023-05-19 VITALS
WEIGHT: 170.9 LBS | SYSTOLIC BLOOD PRESSURE: 106 MMHG | BODY MASS INDEX: 25.24 KG/M2 | HEART RATE: 76 BPM | DIASTOLIC BLOOD PRESSURE: 54 MMHG | RESPIRATION RATE: 16 BRPM

## 2023-05-19 DIAGNOSIS — Z09 HOSPITAL DISCHARGE FOLLOW-UP: Primary | ICD-10-CM

## 2023-05-19 DIAGNOSIS — Z85.850 HISTORY OF THYROID CANCER: ICD-10-CM

## 2023-05-19 DIAGNOSIS — Z95.1 S/P CABG X 2: ICD-10-CM

## 2023-05-19 DIAGNOSIS — I25.10 CORONARY ARTERY DISEASE INVOLVING NATIVE CORONARY ARTERY OF NATIVE HEART WITHOUT ANGINA PECTORIS: ICD-10-CM

## 2023-05-19 DIAGNOSIS — R73.01 IMPAIRED FASTING BLOOD SUGAR: ICD-10-CM

## 2023-05-19 DIAGNOSIS — I50.32 CHRONIC DIASTOLIC CHF (CONGESTIVE HEART FAILURE) (HCC): ICD-10-CM

## 2023-05-19 DIAGNOSIS — I10 ESSENTIAL HYPERTENSION: ICD-10-CM

## 2023-05-19 DIAGNOSIS — E03.9 ACQUIRED HYPOTHYROIDISM: ICD-10-CM

## 2023-05-19 PROBLEM — I25.5 ISCHEMIC CARDIOMYOPATHY: Status: ACTIVE | Noted: 2023-05-19

## 2023-05-19 SDOH — ECONOMIC STABILITY: HOUSING INSECURITY
IN THE LAST 12 MONTHS, WAS THERE A TIME WHEN YOU DID NOT HAVE A STEADY PLACE TO SLEEP OR SLEPT IN A SHELTER (INCLUDING NOW)?: NO

## 2023-05-19 SDOH — ECONOMIC STABILITY: INCOME INSECURITY: HOW HARD IS IT FOR YOU TO PAY FOR THE VERY BASICS LIKE FOOD, HOUSING, MEDICAL CARE, AND HEATING?: NOT HARD AT ALL

## 2023-05-19 SDOH — ECONOMIC STABILITY: FOOD INSECURITY: WITHIN THE PAST 12 MONTHS, YOU WORRIED THAT YOUR FOOD WOULD RUN OUT BEFORE YOU GOT MONEY TO BUY MORE.: NEVER TRUE

## 2023-05-19 SDOH — ECONOMIC STABILITY: FOOD INSECURITY: WITHIN THE PAST 12 MONTHS, THE FOOD YOU BOUGHT JUST DIDN'T LAST AND YOU DIDN'T HAVE MONEY TO GET MORE.: NEVER TRUE

## 2023-05-19 ASSESSMENT — PATIENT HEALTH QUESTIONNAIRE - PHQ9
SUM OF ALL RESPONSES TO PHQ9 QUESTIONS 1 & 2: 0
SUM OF ALL RESPONSES TO PHQ QUESTIONS 1-9: 0
1. LITTLE INTEREST OR PLEASURE IN DOING THINGS: 0
SUM OF ALL RESPONSES TO PHQ QUESTIONS 1-9: 0
2. FEELING DOWN, DEPRESSED OR HOPELESS: 0
1. LITTLE INTEREST OR PLEASURE IN DOING THINGS: 0
SUM OF ALL RESPONSES TO PHQ9 QUESTIONS 1 & 2: 0
SUM OF ALL RESPONSES TO PHQ QUESTIONS 1-9: 0
SUM OF ALL RESPONSES TO PHQ QUESTIONS 1-9: 0
2. FEELING DOWN, DEPRESSED OR HOPELESS: 0
SUM OF ALL RESPONSES TO PHQ QUESTIONS 1-9: 0

## 2023-05-19 ASSESSMENT — ENCOUNTER SYMPTOMS
ABDOMINAL PAIN: 0
NAUSEA: 0
SHORTNESS OF BREATH: 0
COUGH: 0

## 2023-05-19 NOTE — PROGRESS NOTES
Post-Discharge Transitional Care Follow Up      Laura Lopez   YOB: 1955    Date of Office Visit:  5/19/2023  Date of Hospital Admission: 5/12/23  Date of Hospital Discharge: 5/17/23  Readmission Risk Score (high >=14%. Medium >=10%):Readmission Risk Score: 10      Care management risk score Rising risk (score 2-5) and Complex Care (Scores >=6): No Risk Score On File     Non face to face  following discharge, date last encounter closed (first attempt may have been earlier): 05/18/2023     Call initiated 2 business days of discharge: Yes     Hospital discharge follow-up  -     OK DISCHARGE MEDS RECONCILED W/ CURRENT OUTPATIENT MED LIST  Coronary artery disease involving native coronary artery of native heart without angina pectoris  -     CBC; Future  -     Lipid Panel; Future  S/P CABG x 2  Chronic diastolic CHF (congestive heart failure) (HCC)  Essential hypertension  Impaired fasting blood sugar  History of thyroid cancer  Acquired hypothyroidism      HH Following. Follow Up CARDIO and CVS in June 2023. DB and cough. Wound care discussed. RTO in 6 months      Medical Decision Making: high complexity  Return in about 6 months (around 11/19/2023), or if symptoms worsen or fail to improve. Subjective:   HPI    Inpatient course: Discharge summary reviewed- see chart.     Admit date:  5/12/2023       Discharge date:  05/17/23      Disposition: Home     Admitting Diagnosis: Coronary artery disease  Discharge Diagnosis: Coronary artery disease     Condition: Stable     Problem List:        Patient Active Problem List   Diagnosis Code    Tinnitus H93.19    Hearing loss d/t noise H83.3X9    Essential hypertension I10    Impaired fasting blood sugar R73.01    Primary osteoarthritis of left knee M17.12    Liver disease K76.9    Orthostatic hypotension I95.1    S/P angioplasty with stent of the LM to LCX with 100% lad lesion and mod rca lesion- med RX 06/04/2018 Z95.820    Coronary

## 2023-05-30 ENCOUNTER — HOSPITAL ENCOUNTER (OUTPATIENT)
Age: 68
Discharge: HOME | End: 2023-05-30
Payer: MEDICARE

## 2023-05-30 ENCOUNTER — HOSPITAL ENCOUNTER (OUTPATIENT)
Dept: GENERAL RADIOLOGY | Age: 68
Discharge: HOME | End: 2023-05-30
Payer: MEDICARE

## 2023-05-30 DIAGNOSIS — I25.10 CAD, MULTIPLE VESSEL: ICD-10-CM

## 2023-05-30 DIAGNOSIS — I25.10 CORONARY ARTERY DISEASE INVOLVING NATIVE CORONARY ARTERY OF NATIVE HEART WITHOUT ANGINA PECTORIS: ICD-10-CM

## 2023-05-30 LAB
CHOLEST SERPL-MCNC: 148 MG/DL (ref 100–199)
DEPRECATED RDW RBC AUTO: 55.9 FL (ref 35–45)
ERYTHROCYTE [DISTWIDTH] IN BLOOD BY AUTOMATED COUNT: 16.7 % (ref 11.5–14.5)
HCT VFR BLD AUTO: 37.2 % (ref 42–52)
HDLC SERPL-MCNC: 52 MG/DL
HGB BLD-MCNC: 11.4 GM/DL (ref 14–18)
LDLC SERPL CALC-MCNC: 78 MG/DL
MCH RBC QN AUTO: 27.9 PG (ref 26–33)
MCHC RBC AUTO-ENTMCNC: 30.6 GM/DL (ref 32.2–35.5)
MCV RBC AUTO: 91.2 FL (ref 80–94)
PLATELET # BLD AUTO: 590 THOU/MM3 (ref 130–400)
PMV BLD AUTO: 9.3 FL (ref 9.4–12.4)
RBC # BLD AUTO: 4.08 MILL/MM3 (ref 4.7–6.1)
TRIGL SERPL-MCNC: 89 MG/DL (ref 0–199)
WBC # BLD AUTO: 7.1 THOU/MM3 (ref 4.8–10.8)

## 2023-05-30 PROCEDURE — 71046 X-RAY EXAM CHEST 2 VIEWS: CPT

## 2023-05-30 PROCEDURE — 85027 COMPLETE CBC AUTOMATED: CPT

## 2023-05-30 PROCEDURE — 80061 LIPID PANEL: CPT

## 2023-05-30 PROCEDURE — 36415 COLL VENOUS BLD VENIPUNCTURE: CPT

## 2023-06-06 ENCOUNTER — OFFICE VISIT (OUTPATIENT)
Dept: CARDIOLOGY CLINIC | Age: 68
End: 2023-06-06
Payer: MEDICARE

## 2023-06-06 VITALS
BODY MASS INDEX: 25.18 KG/M2 | HEIGHT: 69 IN | SYSTOLIC BLOOD PRESSURE: 103 MMHG | WEIGHT: 170 LBS | DIASTOLIC BLOOD PRESSURE: 63 MMHG | HEART RATE: 69 BPM

## 2023-06-06 DIAGNOSIS — I25.5 CARDIOMYOPATHY, ISCHEMIC: ICD-10-CM

## 2023-06-06 DIAGNOSIS — I10 ESSENTIAL HYPERTENSION: ICD-10-CM

## 2023-06-06 DIAGNOSIS — I25.10 CORONARY ARTERY DISEASE INVOLVING NATIVE CORONARY ARTERY OF NATIVE HEART WITHOUT ANGINA PECTORIS: Primary | ICD-10-CM

## 2023-06-06 DIAGNOSIS — Z95.1 S/P CABG X 2: ICD-10-CM

## 2023-06-06 DIAGNOSIS — I95.1 ORTHOSTATIC HYPOTENSION: ICD-10-CM

## 2023-06-06 DIAGNOSIS — E78.00 PURE HYPERCHOLESTEROLEMIA: ICD-10-CM

## 2023-06-06 DIAGNOSIS — Z98.890 S/P CARDIAC CATH: ICD-10-CM

## 2023-06-06 DIAGNOSIS — Z95.820 S/P ANGIOPLASTY WITH STENT: ICD-10-CM

## 2023-06-06 PROBLEM — R07.9 CHEST PAIN: Status: RESOLVED | Noted: 2023-05-02 | Resolved: 2023-06-06

## 2023-06-06 PROBLEM — R07.9 CHEST PAIN ON EXERTION: Status: RESOLVED | Noted: 2023-04-13 | Resolved: 2023-06-06

## 2023-06-06 PROCEDURE — 3074F SYST BP LT 130 MM HG: CPT | Performed by: INTERNAL MEDICINE

## 2023-06-06 PROCEDURE — 1036F TOBACCO NON-USER: CPT | Performed by: INTERNAL MEDICINE

## 2023-06-06 PROCEDURE — 99215 OFFICE O/P EST HI 40 MIN: CPT | Performed by: INTERNAL MEDICINE

## 2023-06-06 PROCEDURE — G8427 DOCREV CUR MEDS BY ELIG CLIN: HCPCS | Performed by: INTERNAL MEDICINE

## 2023-06-06 PROCEDURE — G8417 CALC BMI ABV UP PARAM F/U: HCPCS | Performed by: INTERNAL MEDICINE

## 2023-06-06 PROCEDURE — 1111F DSCHRG MED/CURRENT MED MERGE: CPT | Performed by: INTERNAL MEDICINE

## 2023-06-06 PROCEDURE — 3078F DIAST BP <80 MM HG: CPT | Performed by: INTERNAL MEDICINE

## 2023-06-06 PROCEDURE — 1123F ACP DISCUSS/DSCN MKR DOCD: CPT | Performed by: INTERNAL MEDICINE

## 2023-06-06 PROCEDURE — 3017F COLORECTAL CA SCREEN DOC REV: CPT | Performed by: INTERNAL MEDICINE

## 2023-06-06 NOTE — PROGRESS NOTES
Refill    aspirin EC 81 MG EC tablet Take 1 tablet by mouth daily 90 tablet 1    lisinopril (PRINIVIL;ZESTRIL) 2.5 MG tablet Take 1 tablet by mouth daily 30 tablet 3    Multiple Vitamin (MULTIVITAMIN) TABS tablet Take 1 tablet by mouth daily (with breakfast) 90 tablet 3    Blood Pressure KIT 1 kit by Does not apply route as needed (as needed) 1 kit 0    clopidogrel (PLAVIX) 75 MG tablet Take 1 tablet by mouth daily      metoprolol succinate (TOPROL XL) 50 MG extended release tablet TAKE 1 TABLET BY MOUTH DAILY 90 tablet 3    atorvastatin (LIPITOR) 40 MG tablet TAKE 1 TABLET BY MOUTH DAILY 30 tablet 11    sildenafil (VIAGRA) 100 MG tablet Take 1 tablet by mouth daily as needed for Erectile Dysfunction 30 tablet 1    Blood Pressure Monitoring (B-D ASSURE BPM/DELUXE ARM CUFF) MISC 1 kit by Does not apply route daily 1 each 0    acetaminophen (TYLENOL) 500 MG tablet Take 1 tablet by mouth 4 times daily as needed for Pain (for neck pain and headaches) 360 tablet 1    levothyroxine (SYNTHROID) 125 MCG tablet Take 1 tablet by mouth Daily       No current facility-administered medications for this visit. Review of Systems -     General ROS: negative  Psychological ROS: negative  Hematological and Lymphatic ROS: No history of blood clots or bleeding disorder. Respiratory ROS: no cough, shortness of breath, or wheezing  Cardiovascular ROS: no chest pain or dyspnea on exertion  Gastrointestinal ROS: negative  Genito-Urinary ROS: no dysuria, trouble voiding, or hematuria  Musculoskeletal ROS: negative  Neurological ROS: no TIA or stroke symptoms  Dermatological ROS: negative      Blood pressure 103/63, pulse 69, height 5' 9\" (1.753 m), weight 170 lb (77.1 kg).         Physical Examination:    General appearance - alert, well appearing, and in no distress  Mental status - alert, oriented to person, place, and time  Neck - supple, no significant adenopathy, no JVD, or carotid bruits  Chest - clear to auscultation, no

## 2023-06-20 ENCOUNTER — HOSPITAL ENCOUNTER (OUTPATIENT)
Dept: CARDIAC REHAB | Age: 68
Setting detail: THERAPIES SERIES
Discharge: HOME | End: 2023-06-20
Payer: MEDICARE

## 2023-06-20 PROCEDURE — G0423 INTENS CARDIAC REHAB NO EXER: HCPCS

## 2023-06-20 PROCEDURE — G0422 INTENS CARDIAC REHAB W/EXERC: HCPCS

## 2023-06-20 ASSESSMENT — PATIENT HEALTH QUESTIONNAIRE - PHQ9
10. IF YOU CHECKED OFF ANY PROBLEMS, HOW DIFFICULT HAVE THESE PROBLEMS MADE IT FOR YOU TO DO YOUR WORK, TAKE CARE OF THINGS AT HOME, OR GET ALONG WITH OTHER PEOPLE: 0
3. TROUBLE FALLING OR STAYING ASLEEP: 0
4. FEELING TIRED OR HAVING LITTLE ENERGY: 0
SUM OF ALL RESPONSES TO PHQ QUESTIONS 1-9: 1
7. TROUBLE CONCENTRATING ON THINGS, SUCH AS READING THE NEWSPAPER OR WATCHING TELEVISION: 0
SUM OF ALL RESPONSES TO PHQ QUESTIONS 1-9: 1
5. POOR APPETITE OR OVEREATING: 0
SUM OF ALL RESPONSES TO PHQ9 QUESTIONS 1 & 2: 0
6. FEELING BAD ABOUT YOURSELF - OR THAT YOU ARE A FAILURE OR HAVE LET YOURSELF OR YOUR FAMILY DOWN: 1
8. MOVING OR SPEAKING SO SLOWLY THAT OTHER PEOPLE COULD HAVE NOTICED. OR THE OPPOSITE, BEING SO FIGETY OR RESTLESS THAT YOU HAVE BEEN MOVING AROUND A LOT MORE THAN USUAL: 0
1. LITTLE INTEREST OR PLEASURE IN DOING THINGS: 0
SUM OF ALL RESPONSES TO PHQ QUESTIONS 1-9: 1
2. FEELING DOWN, DEPRESSED OR HOPELESS: 0
9. THOUGHTS THAT YOU WOULD BE BETTER OFF DEAD, OR OF HURTING YOURSELF: 0
SUM OF ALL RESPONSES TO PHQ QUESTIONS 1-9: 1

## 2023-06-20 NOTE — PROGRESS NOTES
Video Education Report - ICR/CR  Name:  Lafaye Epley     Date:  6/20/2023  MRN: [id number]     Session #:  1  Session Length: 40 min    Core Videos        [x]Heart Disease Risk Reduction       []Overview of Pritikin Eating Plan      []Move it          []Calorie Density         []Healthy Minds, Bodies, Hearts        []Label Reading - Part 1       []Metabolic Syndrome and Belly Fat        []How Our Thoughts Can Heal Our Hearts   []Dining Out - Part 1      []Biomechanical Limitations  []Facts on Fat        []Hypertension & Heart Disease    []Diseases of Our Time - Focusing on Diabetes   []Body Composition  []Nutrition Action Plan    []Exercise Action Plan      Comments:  Video completed, group discussion

## 2023-06-20 NOTE — PLAN OF CARE
[facility] Program - [id number]  [id number] Facility-Based Program  Individualized Cardiac Treatment Plan    Patient Name:  Ole Vasquez  :  1955  Age:  76 y.o. MRN:  [id number]  Diagnosis: CABG  Date of Event: 23   Physician:  Adebayo Schwarz Office Visit:  23  Date Entered Program: 23  Risk Stratifications: [] Low [x] Intermediate [] High  Allergies:    Allergies   Allergen Reactions    Pepto-Bismol [Bismuth Subsalicylate] Nausea And Vomiting       Individual Cardiac Treatment Plan -EXERCISE  INITIAL 30 DAY 60 DAY 90  DAY FINAL DAY   EXERCISE  ASSESSMENT/PLAN EXERCISE  REASSESSMENT EXERCISE   REASSESSMENT EXERCISE   REASSESSMENT EXERCISE   REASSESSMENT EXERCISE  DISCHARGE/FOLLOW-UP   Date: 23 Date: Date: Date: Date: Date:   Session #1   Total Session #   First Exercise Session:   Total Session #  Total Session #  Total Session #  Total Session #   Last Exercise Session:     Stages of Change Stages of Change Stages of Change Stages of Change Stages of Change Stages of Change   [] Pre Contemplation  [x] Contemplation  [] Preparation  [] Action  [] Maintenance  [] Relapse [] Pre Contemplation  [] Contemplation  [] Preparation  [] Action  [] Maintenance  [] Relapse [] Pre Contemplation  [] Contemplation  [] Preparation  [] Action  [] Maintenance  [] Relapse [] Pre Contemplation  [] Contemplation  [] Preparation  [] Action  [] Maintenance  [] Relapse [] Pre Contemplation  [] Contemplation  [] Preparation  [] Action  [] Maintenance  [] Relapse [] Pre Contemplation  [] Contemplation  [] Preparation  [] Action  [] Maintenance  [] Relapse   EXERCISE ASSESSMENT EXERCISE ASSESSMENT EXERCISE ASSESSMENT EXERCISE ASSESSMENT EXERCISE ASSESSMENT EXERCISE ASSESSMENT   6 Min Walk Test  Distance walked:   0.10 miles  528 ft.  1.8 METs  Max HR:77 BPM      RPE:  11    THR:  110-127  Rhythm:  NSR     6 Min Walk Test  Distance walked:    miles   ft   METs  Max HR: BPM      RPE:
workloads  [] associated symptoms  Monitored telemetry has revealed     [] documented arrhythmia at increasing workloads  [] associated symptoms    Physician Response    [x] Cardiac rehab is reasonably and medically necessary for continuous cardiac monitoring surveillance  of patient's cardiac activity  [x] Initiate continuous telemetry monitoring and notify me with any concerns  [] Other   Physician Response    [x] Cardiac rehab is reasonably and medically necessary for continuous cardiac monitoring surveillance  of patient's cardiac activity  [x] Continue continuous telemetry monitoring and notify me with any concerns  [] Other     Physician Response    [x] Cardiac rehab is reasonably and medically necessary for continuous cardiac monitoring surveillance  of patient's cardiac activity  [x] Continue continuous telemetry monitoring and notify me with any concerns   [] Other     Physician Response    [x] Cardiac rehab is reasonably and medically necessary for continuous cardiac monitoring surveillance  of patient's cardiac activity  [x] Continue continuous telemetry monitoring and notify me with any concerns   [] Other     Physician Response    [x] Cardiac rehab is reasonably and medically necessary for continuous cardiac monitoring surveillance  of patient's cardiac activity  [x] Continue continuous telemetry monitoring and notify me with any concerns   [] Other

## 2023-06-23 ENCOUNTER — HOSPITAL ENCOUNTER (OUTPATIENT)
Dept: CARDIAC REHAB | Age: 68
Setting detail: THERAPIES SERIES
Discharge: HOME | End: 2023-06-23
Payer: MEDICARE

## 2023-06-23 PROCEDURE — G0422 INTENS CARDIAC REHAB W/EXERC: HCPCS

## 2023-06-23 PROCEDURE — G0423 INTENS CARDIAC REHAB NO EXER: HCPCS

## 2023-06-23 NOTE — PROGRESS NOTES
Lilibeth SEYMOUR.:  1955  Acct Number: [de-identified]  MRN:  [id number]                  [facility] COOKING SCHOOL WORKSHOP             Date: 2023        Session # ________   Colt Loud class covered:    [facility]    [] Adding Flavor - Sodium-Free  [] Fast & Healthy Breakfasts    [] Powerhouse Plant-Based Proteins [] Satisfying Salads and Dressings    [] Simple Sides & Sauces   [x] Personalizing Your Plate    [] Delicious Desserts    [] Savory Soups    [] Efficiency Cooking - Meals in a Snap [] Tasty Appetizers & Snacks     [] Comforting Weekend Breakfasts  [] One-Pot Wonders    [] Fast Evening Meals   [] Easy Entertaining    []  International Cuisine Spotlight on the Blue Zone          Patients were shown how to choose, prep, and cook; substitutions and other options were given. Samples were offered. Recipes were given and questions answered. The patient above was in the [employer] for 40 minutes.       Electronically signed by Rock Lamin RD on 2023 at 3:44 PM

## 2023-06-26 ENCOUNTER — HOSPITAL ENCOUNTER (OUTPATIENT)
Dept: CARDIAC REHAB | Age: 68
Setting detail: THERAPIES SERIES
Discharge: HOME | End: 2023-06-26
Payer: MEDICARE

## 2023-06-26 PROCEDURE — G0423 INTENS CARDIAC REHAB NO EXER: HCPCS

## 2023-06-26 PROCEDURE — G0422 INTENS CARDIAC REHAB W/EXERC: HCPCS

## 2023-06-28 ENCOUNTER — HOSPITAL ENCOUNTER (OUTPATIENT)
Dept: CARDIAC REHAB | Age: 68
Setting detail: THERAPIES SERIES
Discharge: HOME | End: 2023-06-28
Payer: MEDICARE

## 2023-06-28 PROCEDURE — G0422 INTENS CARDIAC REHAB W/EXERC: HCPCS

## 2023-06-28 PROCEDURE — G0423 INTENS CARDIAC REHAB NO EXER: HCPCS

## 2023-06-30 ENCOUNTER — HOSPITAL ENCOUNTER (OUTPATIENT)
Dept: CARDIAC REHAB | Age: 68
Setting detail: THERAPIES SERIES
Discharge: HOME | End: 2023-06-30
Payer: MEDICARE

## 2023-06-30 PROCEDURE — G0422 INTENS CARDIAC REHAB W/EXERC: HCPCS

## 2023-06-30 PROCEDURE — G0423 INTENS CARDIAC REHAB NO EXER: HCPCS

## 2023-07-03 ENCOUNTER — HOSPITAL ENCOUNTER (OUTPATIENT)
Dept: CARDIAC REHAB | Age: 68
Setting detail: THERAPIES SERIES
Discharge: HOME | End: 2023-07-03
Payer: MEDICARE

## 2023-07-03 PROCEDURE — G0423 INTENS CARDIAC REHAB NO EXER: HCPCS

## 2023-07-03 PROCEDURE — G0422 INTENS CARDIAC REHAB W/EXERC: HCPCS

## 2023-07-03 NOTE — PROGRESS NOTES
Video Education Report - ICR/CR  Name:  Tio Kraft     Date:  7/3/2023  MRN: [id number]     Session #:    Session Length: 40 min    Core Videos        []Heart Disease Risk Reduction       [x]Overview of Pritikin Eating Plan      []Move it          []Calorie Density         []Healthy Minds, Bodies, Hearts        []Label Reading - Part 1       []Metabolic Syndrome and Belly Fat        []How Our Thoughts Can Heal Our Hearts   []Dining Out - Part 1      []Biomechanical Limitations  []Facts on Fat        []Hypertension & Heart Disease    []Diseases of Our Time - Focusing on Diabetes   []Body Composition  []Nutrition Action Plan    []Exercise Action Plan    Exercise      Healthy Mind-Set  []Improving Performance    []Smoking Cessation    []Introduction to [address]  []Aging-Enhancing the Quality of Your Life  []Becoming a Pritikin   []Biology of Weight Control    []Cooking Breakfasts   []Decoding Lab Results    and Snacks  []Diseases of Our Time - Overview   []Cooking Dinner and   []Sleep Disorders     Sides  []Targeting Your Nutrition Priorities   []Healthy Salads &   Dressings  Nutrition      []Cooking Soups and   []Dining Out - Part 2     Desserts  []Fueling a Healthy Body   []Menu Workshop     Overview  []Planning Your Eating Strategy   []The Pritikin Solution  []Vitamins and Minerals    Comments:  Video completed, group discussion

## 2023-07-05 ENCOUNTER — HOSPITAL ENCOUNTER (OUTPATIENT)
Dept: CARDIAC REHAB | Age: 68
Setting detail: THERAPIES SERIES
Discharge: HOME | End: 2023-07-05
Payer: MEDICARE

## 2023-07-05 PROCEDURE — G0423 INTENS CARDIAC REHAB NO EXER: HCPCS

## 2023-07-05 PROCEDURE — G0422 INTENS CARDIAC REHAB W/EXERC: HCPCS

## 2023-07-05 NOTE — PROGRESS NOTES
Jasmin Christopher YOB: 1955    Acct Number: [de-identified]   MRN:  [id number]                             [facility] NUTRITION WORKSHOP             Date: 2023        Session # _______    Ellen Rodriguez class covered:    [x]   Fueling a Healthy Body  []   Mindful Eating  []   Targeting Nutrition Priorities  []   Dining Out :  Making the Most of a Menu  []   Label Reading    Readiness to change:    []  Pre-contemplative   []  Contemplative - ambivalent about change    []  Action - ready to set action plan and implement   []  Maintenance - has made change and is trying, and or practicing different alternative         behaviors     Bogdan was in the Workshop with the Dietitian for 40 minutes. The content was presented via Powerpoint, lecture, and patient participation based format. Motivational interviewing was utilized when needed, to promote change. Patient voiced understanding.     Electronically signed by Carl Grace RD on 2023 at 2:52 PM

## 2023-07-07 ENCOUNTER — HOSPITAL ENCOUNTER (OUTPATIENT)
Dept: CARDIAC REHAB | Age: 68
Setting detail: THERAPIES SERIES
Discharge: HOME | End: 2023-07-07
Payer: MEDICARE

## 2023-07-07 PROCEDURE — G0422 INTENS CARDIAC REHAB W/EXERC: HCPCS

## 2023-07-07 PROCEDURE — G0423 INTENS CARDIAC REHAB NO EXER: HCPCS

## 2023-07-07 NOTE — PROGRESS NOTES
Polly SEYMOUR.:  1955  Acct Number: [de-identified]  MRN:  [id number]                  [facility] COOKING SCHOOL WORKSHOP             Date: 2023        Session # ________   [place] class covered:    [address]    [] Adding Flavor - Sodium-Free  [x] Fast & Healthy Breakfasts    [] Powerhouse Plant-Based Proteins [] Satisfying Salads and Dressings    [] Simple Sides & Sauces   [] Personalizing Your Plate    [] Delicious Desserts    [] Savory Soups    [] Efficiency Cooking - Meals in a Snap [] Tasty Appetizers & Snacks     [] Comforting Weekend Breakfasts  [] One-Pot Wonders    [] Fast Evening Meals   [] Easy Entertaining    []  International Cuisine Spotlight on the Blue Zone          Patients were shown how to choose, prep, and cook; substitutions and other options were given. Samples were offered. Recipes were given and questions answered. The patient above was in the [employer] for 40 minutes.       Electronically signed by Diro Easley RD on 2023 at 3:30 PM Quality 431: Preventive Care And Screening: Unhealthy Alcohol Use - Screening: Patient screened for unhealthy alcohol use using a single question and scores less than 2 times per year Detail Level: Detailed Quality 226: Preventive Care And Screening: Tobacco Use: Screening And Cessation Intervention: Patient screened for tobacco use, is a smoker AND received Cessation Counseling Quality 130: Documentation Of Current Medications In The Medical Record: Current Medications Documented

## 2023-07-10 ENCOUNTER — HOSPITAL ENCOUNTER (OUTPATIENT)
Dept: CARDIAC REHAB | Age: 68
Setting detail: THERAPIES SERIES
Discharge: HOME | End: 2023-07-10
Payer: MEDICARE

## 2023-07-10 PROCEDURE — G0422 INTENS CARDIAC REHAB W/EXERC: HCPCS

## 2023-07-10 PROCEDURE — G0423 INTENS CARDIAC REHAB NO EXER: HCPCS

## 2023-07-10 NOTE — PROGRESS NOTES
Video Education Report - ICR/CR  Name:  Jasmin Christopher     Date:  7/10/2023  MRN: [id number]     Session #:    Session Length: 40 min    Core Videos        []Heart Disease Risk Reduction       []Overview of Pritikin Eating Plan      [x]Move it          []Calorie Density         []Healthy Minds, Bodies, Hearts        []Label Reading - Part 1       []Metabolic Syndrome and Belly Fat        []How Our Thoughts Can Heal Our Hearts   []Dining Out - Part 1      []Biomechanical Limitations  []Facts on Fat        []Hypertension & Heart Disease    []Diseases of Our Time - Focusing on Diabetes   []Body Composition  []Nutrition Action Plan    []Exercise Action Plan    Exercise      Healthy Mind-Set  []Improving Performance    []Smoking Cessation    []Introduction to [address]  []Aging-Enhancing the Quality of Your Life  []Becoming a Pritikin   []Biology of Weight Control    []Cooking Breakfasts   []Decoding Lab Results    and Snacks  []Diseases of Our Time - Overview   []Cooking Dinner and   []Sleep Disorders     Sides  []Targeting Your Nutrition Priorities   []Healthy Salads &   Dressings  Nutrition      []Cooking Soups and   []Dining Out - Part 2     Desserts  []Fueling a Healthy Body   []Menu Workshop     Overview  []Planning Your Eating Strategy   []The Pritikin Solution  []Vitamins and Minerals    Comments:  Video completed, group discussion

## 2023-07-12 ENCOUNTER — HOSPITAL ENCOUNTER (OUTPATIENT)
Dept: CARDIAC REHAB | Age: 68
Setting detail: THERAPIES SERIES
Discharge: HOME | End: 2023-07-12
Payer: MEDICARE

## 2023-07-12 PROCEDURE — G0423 INTENS CARDIAC REHAB NO EXER: HCPCS

## 2023-07-12 PROCEDURE — G0422 INTENS CARDIAC REHAB W/EXERC: HCPCS

## 2023-07-12 NOTE — PROGRESS NOTES
Sarthak RODRIGUEZO.B.:  1955    Acct Number: [de-identified]   MRN:  [id number]                             [facility] HEALTHY MIND-SET WORKSHOP             Date: 2023        Session #________    Gordon Power class covered:    []  New Thoughts New Behaviors Workshop:  Patient will learn and practice techniques for developing effective health and lifestyle goals. Patient will be able to effectively apply the goal setting process learned to develop at least one new personal goal.     []  Managing Moods & Relationships Workshop:  Patient will learn how emotional and chronic stress factors can impact their hearts. They will learn healthy ways to handle stress and utilize positive coping mechanisms. In addition, [facility] patient will learn ways to improve communication skills. []  Healthy Sleep for a healthy Heart:  Patients will learn the importance of sleep to overall health, well-being, and quality of life. They will understand the symptoms of, and treatments for, common sleep disorders. Patients will also be able to identify daytime and nighttime behaviors which impact sleep, and they will be able to apply these tools to help manage sleep-related challenges. []  Recognizing and Reducing Stress:  Patients will learn about stress and how to recognize stress. Patients will gain insight into the toll that chronic stress takes on their health, both emotionally and physically. Patients will learn and practice a variety of stress management techniques. Patients will be able to effectively apply coping mechanisms in perceived stressful situations. Patient actively participated and verbalized understanding. Total time in the Healthy Mind-Set class was 60 minutes.     Electronically signed by COLTEN Hunt on 2023 at 4:12 PM

## 2023-07-14 ENCOUNTER — HOSPITAL ENCOUNTER (OUTPATIENT)
Dept: CARDIAC REHAB | Age: 68
Setting detail: THERAPIES SERIES
End: 2023-07-14
Payer: MEDICARE

## 2023-07-14 ENCOUNTER — APPOINTMENT (OUTPATIENT)
Dept: CARDIAC REHAB | Age: 68
End: 2023-07-14
Payer: MEDICARE

## 2023-07-17 ENCOUNTER — HOSPITAL ENCOUNTER (OUTPATIENT)
Dept: CARDIAC REHAB | Age: 68
Setting detail: THERAPIES SERIES
Discharge: HOME | End: 2023-07-17
Payer: MEDICARE

## 2023-07-17 PROCEDURE — G0422 INTENS CARDIAC REHAB W/EXERC: HCPCS

## 2023-07-17 PROCEDURE — G0423 INTENS CARDIAC REHAB NO EXER: HCPCS

## 2023-07-17 NOTE — PLAN OF CARE
Hypertension  [x] Yes      [] No    Resting BP: 120/84  Peak Ex BP:126/80  Medication: lisinopril, TOPROL XL   Hypertension  Resting BP: 112/74  Peak Ex BP: 148/88  Medication Changes:  [] Yes      [x] No Hypertension  Resting BP:   Peak Ex BP:  Medication Changes:  [] Yes      [] No Hypertension  Resting BP:   Peak Ex BP:  Medication Changes:  [] Yes      [] No Hypertension  Resting BP:   Peak Ex BP:  Medication Changes:  [] Yes      [] No Hypertension  Resting BP:   Peak Ex BP:  Medication Changes:  [] Yes      [] No   Lipids  HLD/DLD  [x] Yes      [] No  TOTAL CHOL: 130  HDL:  52  LDL:  58  TRI  Medication: lipitor Lipids  Medication Changes:  [] Yes      [x] No     Lipids  Medication Changes:  [] Yes      [] No     Lipids  Medication Changes:  [] Yes      [] No     Lipids  Medication Changes:  [] Yes      [] No Lipids    TOTAL CHOL:   HDL:    LDL:    TRIG:    Medication Changes:  [] Yes      [] No   Diabetes  [] Yes      [x] No  FBS: 120           HbA1C: 6.0         Diabetes  [x] No   Diabetes  Most Recent BS:  BS have been in range  [] Yes      [] No  Medication Changes  [] Yes      [] No     Diabetes  Most Recent BS:  BS have been in range  [] Yes      [] No  Medication Changes  [] Yes      [] No     Diabetes  Most Recent BS:  BS have been in range  [] Yes      [] No  Medication Changes  [] Yes      [] No Diabetes  Most Recent BS:  BS have been in range  [] Yes      [] No  Medication Changes  [] Yes      [] No       Tobacco Use  [] Current  [] Former  [x] Never     Tobacco Use  Change in smoking status   [] Yes      [x] No       Tobacco Use  Change in smoking status   [] Yes      [] No    Quit date:    Tobacco Use  Change in smoking status   [] Yes      [] No    Quit date:  Tobacco Use  Change in smoking status   [] Yes      [] No    Quit date:  Tobacco Use  Change in smoking status   [] Yes      [] No    Quit date:              Learning Barriers  Please select one:  [] Speech  [] Literacy  []

## 2023-07-18 NOTE — PROGRESS NOTES
Phase II Cardiac Rehabilitation   Physician Order Form    Clarita Gilbert  1955  [id number]  7/18/2023    Procedure/Code:  Phase 2 Cardiac Rehabilitation/54284  Diagnosis/Code:  CABG/I95.1  Onset/Procudure Date: 5/12/2023    Prescribed Exercise Plan:  Submaximal exercise evaluation at program beginning and completion  Target HR: 40-60% HRR  Initial MET Level: 3.3-3.8 METs    Duration: 31 - 90 Minutes  Frequency: 3 Days per week  Modalities:  Treadmill   Bicycle ergometer/UBE  Seated Stepper  Rowing Machine  Weights/bands  May increase duration per F.I.T.T. protocol parameters on average 5-10 minutes every 1-2 weeks for the first 4-6 weeks. After 3-4 weeks completed, continue to gradually increase F.I.T.T. parameters gradually at the established duration on average 0.5-1.0 METs per 30 days over the course of the remaining program, as established by patient centered goals and guidelines, maintaining RPE 12-16. Introduce 8-15 bilateral upper /lower extremity resistance exercise at 1-3 sets per lift, on 2-3 non-consecutive days using TheraBand/weights to 8-15 reps on at lease 2 occasions, maintaining RPE 12-16. Once repetition maximum has been achieved, additional weight sets may be added. Standing Orders:  Initiate ACLS for cardiac events  Nitroglycerine 0.4mg SL every 5 minutes X 3 for angina pain  12 lead EKG for chest pain or dysrhythmia  O2 per nasal cannula as needed for SpO2 <90% with symptoms  Blood sugar monitoring for hyper/hypoglycemia symptoms  Lipid panel pre/post program as needed.

## 2023-07-19 ENCOUNTER — HOSPITAL ENCOUNTER (OUTPATIENT)
Dept: CARDIAC REHAB | Age: 68
Setting detail: THERAPIES SERIES
Discharge: HOME | End: 2023-07-19
Payer: MEDICARE

## 2023-07-19 ENCOUNTER — APPOINTMENT (OUTPATIENT)
Dept: CARDIAC REHAB | Age: 68
End: 2023-07-19
Payer: MEDICARE

## 2023-07-19 PROCEDURE — 93798 PHYS/QHP OP CAR RHAB W/ECG: CPT

## 2023-07-19 NOTE — PLAN OF CARE
Completed Additional Educational Videos Completed Additional Educational Videos Completed Additional Educational Videos Completed Additional Educational Videos Completed    [] Yes    [] No   *Goals* *Goals* *Goals* *Goals* *Goals* *Goals*   Risk Factor/Core Component Goals:  Bogdan's risk factor/core component goals to attend recommended education sessions, keep blood pressure controlled, increase exercise time at home. Progress towards goals:  Letha Bullboyd 's risk factor/core component goals continue to attend recommended education sessions, keep blood pressure controlled, increase exercise time at home.  Progress towards goals:  [place] ** Progress towards goals:  [place] ** Progress towards goals:  [place] ** Progress towards goals:  Bogdan **     Monitored telemetry has revealed NSR     Monitored telemetry has revealed NSR     Monitored telemetry has revealed    [] documented arrhythmia at increasing workloads  [] associated symptoms  Monitored telemetry has revealed     [] documented arrhythmia at increasing workloads  [] associated symptoms  Monitored telemetry has revealed     [] documented arrhythmia at increasing workloads  [] associated symptoms  Monitored telemetry has revealed     [] documented arrhythmia at increasing workloads  [] associated symptoms    Physician Response    [x] Cardiac rehab is reasonably and medically necessary for continuous cardiac monitoring surveillance  of patient's cardiac activity  [x] Initiate continuous telemetry monitoring and notify me with any concerns  [] Other   Physician Response    [x] Cardiac rehab is reasonably and medically necessary for continuous cardiac monitoring surveillance  of patient's cardiac activity  [x] Continue continuous telemetry monitoring and notify me with any concerns  [] Other     Physician Response    [x] Cardiac rehab is reasonably and medically necessary for continuous cardiac monitoring surveillance  of patient's cardiac activity  [x] Continue continuous

## 2023-07-20 ENCOUNTER — HOSPITAL ENCOUNTER (OUTPATIENT)
Dept: CARDIAC REHAB | Age: 68
Setting detail: THERAPIES SERIES
Discharge: HOME | End: 2023-07-20
Payer: MEDICARE

## 2023-07-20 PROCEDURE — 93798 PHYS/QHP OP CAR RHAB W/ECG: CPT

## 2023-07-21 ENCOUNTER — APPOINTMENT (OUTPATIENT)
Dept: CARDIAC REHAB | Age: 68
End: 2023-07-21
Payer: MEDICARE

## 2023-07-24 ENCOUNTER — APPOINTMENT (OUTPATIENT)
Dept: CARDIAC REHAB | Age: 68
End: 2023-07-24
Payer: MEDICARE

## 2023-07-24 ENCOUNTER — HOSPITAL ENCOUNTER (OUTPATIENT)
Dept: CARDIAC REHAB | Age: 68
Setting detail: THERAPIES SERIES
Discharge: HOME | End: 2023-07-24
Payer: MEDICARE

## 2023-07-24 PROCEDURE — 93798 PHYS/QHP OP CAR RHAB W/ECG: CPT

## 2023-07-26 ENCOUNTER — APPOINTMENT (OUTPATIENT)
Dept: CARDIAC REHAB | Age: 68
End: 2023-07-26
Payer: MEDICARE

## 2023-07-27 ENCOUNTER — HOSPITAL ENCOUNTER (OUTPATIENT)
Dept: CARDIAC REHAB | Age: 68
Setting detail: THERAPIES SERIES
Discharge: HOME | End: 2023-07-27
Payer: MEDICARE

## 2023-07-27 PROCEDURE — 93798 PHYS/QHP OP CAR RHAB W/ECG: CPT

## 2023-07-28 ENCOUNTER — HOSPITAL ENCOUNTER (OUTPATIENT)
Dept: CARDIAC REHAB | Age: 68
Setting detail: THERAPIES SERIES
Discharge: HOME | End: 2023-07-28
Payer: MEDICARE

## 2023-07-28 ENCOUNTER — APPOINTMENT (OUTPATIENT)
Dept: CARDIAC REHAB | Age: 68
End: 2023-07-28
Payer: MEDICARE

## 2023-07-28 PROCEDURE — 93798 PHYS/QHP OP CAR RHAB W/ECG: CPT

## 2023-07-31 ENCOUNTER — HOSPITAL ENCOUNTER (OUTPATIENT)
Dept: CARDIAC REHAB | Age: 68
Setting detail: THERAPIES SERIES
Discharge: HOME | End: 2023-07-31
Payer: MEDICARE

## 2023-07-31 ENCOUNTER — APPOINTMENT (OUTPATIENT)
Dept: CARDIAC REHAB | Age: 68
End: 2023-07-31
Payer: MEDICARE

## 2023-07-31 PROCEDURE — 93798 PHYS/QHP OP CAR RHAB W/ECG: CPT

## 2023-08-03 ENCOUNTER — HOSPITAL ENCOUNTER (OUTPATIENT)
Dept: CARDIAC REHAB | Age: 68
Setting detail: THERAPIES SERIES
Discharge: HOME | End: 2023-08-03
Payer: MEDICARE

## 2023-08-03 PROCEDURE — 93798 PHYS/QHP OP CAR RHAB W/ECG: CPT

## 2023-08-04 ENCOUNTER — HOSPITAL ENCOUNTER (OUTPATIENT)
Dept: CARDIAC REHAB | Age: 68
Setting detail: THERAPIES SERIES
Discharge: HOME | End: 2023-08-04
Payer: MEDICARE

## 2023-08-04 PROCEDURE — 93798 PHYS/QHP OP CAR RHAB W/ECG: CPT

## 2023-08-07 ENCOUNTER — HOSPITAL ENCOUNTER (OUTPATIENT)
Dept: CARDIAC REHAB | Age: 68
Setting detail: THERAPIES SERIES
Discharge: HOME | End: 2023-08-07
Payer: MEDICARE

## 2023-08-07 PROCEDURE — 93798 PHYS/QHP OP CAR RHAB W/ECG: CPT

## 2023-08-09 ENCOUNTER — HOSPITAL ENCOUNTER (OUTPATIENT)
Dept: CARDIAC REHAB | Age: 68
Setting detail: THERAPIES SERIES
Discharge: HOME | End: 2023-08-09
Payer: MEDICARE

## 2023-08-09 PROCEDURE — 93798 PHYS/QHP OP CAR RHAB W/ECG: CPT

## 2023-08-10 ENCOUNTER — HOSPITAL ENCOUNTER (OUTPATIENT)
Dept: CARDIAC REHAB | Age: 68
Setting detail: THERAPIES SERIES
Discharge: HOME | End: 2023-08-10
Payer: MEDICARE

## 2023-08-10 PROCEDURE — 93798 PHYS/QHP OP CAR RHAB W/ECG: CPT

## 2023-08-14 ENCOUNTER — HOSPITAL ENCOUNTER (OUTPATIENT)
Dept: CARDIAC REHAB | Age: 68
Setting detail: THERAPIES SERIES
Discharge: HOME | End: 2023-08-14
Payer: MEDICARE

## 2023-08-14 PROCEDURE — 93798 PHYS/QHP OP CAR RHAB W/ECG: CPT

## 2023-08-16 ENCOUNTER — HOSPITAL ENCOUNTER (OUTPATIENT)
Dept: CARDIAC REHAB | Age: 68
Setting detail: THERAPIES SERIES
Discharge: HOME | End: 2023-08-16
Payer: MEDICARE

## 2023-08-16 PROCEDURE — 93798 PHYS/QHP OP CAR RHAB W/ECG: CPT

## 2023-08-17 ENCOUNTER — HOSPITAL ENCOUNTER (OUTPATIENT)
Dept: CARDIAC REHAB | Age: 68
Setting detail: THERAPIES SERIES
Discharge: HOME | End: 2023-08-17
Payer: MEDICARE

## 2023-08-17 PROCEDURE — 93798 PHYS/QHP OP CAR RHAB W/ECG: CPT

## 2023-08-21 ENCOUNTER — HOSPITAL ENCOUNTER (OUTPATIENT)
Dept: CARDIAC REHAB | Age: 68
Setting detail: THERAPIES SERIES
Discharge: HOME | End: 2023-08-21
Payer: MEDICARE

## 2023-08-21 PROCEDURE — 93798 PHYS/QHP OP CAR RHAB W/ECG: CPT

## 2023-08-23 ENCOUNTER — HOSPITAL ENCOUNTER (OUTPATIENT)
Dept: CARDIAC REHAB | Age: 68
Setting detail: THERAPIES SERIES
Discharge: HOME | End: 2023-08-23
Payer: MEDICARE

## 2023-08-23 PROCEDURE — 93798 PHYS/QHP OP CAR RHAB W/ECG: CPT

## 2023-08-23 RX ORDER — ASPIRIN 81 MG/1
81 TABLET ORAL DAILY
Qty: 90 TABLET | Refills: 0 | Status: SHIPPED | OUTPATIENT
Start: 2023-08-23

## 2023-08-24 ENCOUNTER — HOSPITAL ENCOUNTER (OUTPATIENT)
Dept: CARDIAC REHAB | Age: 68
Setting detail: THERAPIES SERIES
Discharge: HOME | End: 2023-08-24
Payer: MEDICARE

## 2023-08-24 PROCEDURE — 93798 PHYS/QHP OP CAR RHAB W/ECG: CPT

## 2023-08-28 ENCOUNTER — HOSPITAL ENCOUNTER (OUTPATIENT)
Dept: CARDIAC REHAB | Age: 68
Setting detail: THERAPIES SERIES
Discharge: HOME | End: 2023-08-28
Payer: MEDICARE

## 2023-08-28 PROCEDURE — 93798 PHYS/QHP OP CAR RHAB W/ECG: CPT

## 2023-08-30 ENCOUNTER — HOSPITAL ENCOUNTER (OUTPATIENT)
Dept: CARDIAC REHAB | Age: 68
Setting detail: THERAPIES SERIES
Discharge: HOME | End: 2023-08-30
Payer: MEDICARE

## 2023-08-30 PROCEDURE — 93798 PHYS/QHP OP CAR RHAB W/ECG: CPT

## 2023-08-31 ENCOUNTER — HOSPITAL ENCOUNTER (OUTPATIENT)
Dept: CARDIAC REHAB | Age: 68
Setting detail: THERAPIES SERIES
Discharge: HOME | End: 2023-08-31
Payer: MEDICARE

## 2023-08-31 PROCEDURE — 93798 PHYS/QHP OP CAR RHAB W/ECG: CPT

## 2023-09-04 ENCOUNTER — APPOINTMENT (OUTPATIENT)
Dept: CARDIAC REHAB | Age: 68
End: 2023-09-04
Payer: MEDICARE

## 2023-09-06 ENCOUNTER — HOSPITAL ENCOUNTER (OUTPATIENT)
Dept: CARDIAC REHAB | Age: 68
Setting detail: THERAPIES SERIES
Discharge: HOME | End: 2023-09-06
Payer: MEDICARE

## 2023-09-06 PROCEDURE — 93798 PHYS/QHP OP CAR RHAB W/ECG: CPT

## 2023-09-07 ENCOUNTER — TELEPHONE (OUTPATIENT)
Dept: FAMILY MEDICINE CLINIC | Age: 68
End: 2023-09-07

## 2023-09-07 NOTE — TELEPHONE ENCOUNTER
Pt was notified and voiced understanding. Says \"they usually send me a message to tell me they got the form, I will give them a call\".

## 2023-09-07 NOTE — TELEPHONE ENCOUNTER
Patient calling to see if we received form from the gas company to not turn off his gas. This was to be faxed to our office 2 days ago.   Please advise

## 2023-09-08 ENCOUNTER — HOSPITAL ENCOUNTER (OUTPATIENT)
Dept: CARDIAC REHAB | Age: 68
Setting detail: THERAPIES SERIES
Discharge: HOME | End: 2023-09-08
Payer: MEDICARE

## 2023-09-08 PROCEDURE — 93798 PHYS/QHP OP CAR RHAB W/ECG: CPT

## 2023-09-12 ENCOUNTER — HOSPITAL ENCOUNTER (OUTPATIENT)
Dept: CARDIAC REHAB | Age: 68
Setting detail: THERAPIES SERIES
Discharge: HOME | End: 2023-09-12
Payer: MEDICARE

## 2023-09-12 PROCEDURE — 93798 PHYS/QHP OP CAR RHAB W/ECG: CPT

## 2023-09-14 ENCOUNTER — HOSPITAL ENCOUNTER (OUTPATIENT)
Dept: CARDIAC REHAB | Age: 68
Setting detail: THERAPIES SERIES
Discharge: HOME | End: 2023-09-14
Payer: MEDICARE

## 2023-09-14 PROCEDURE — 93798 PHYS/QHP OP CAR RHAB W/ECG: CPT

## 2023-09-15 ENCOUNTER — HOSPITAL ENCOUNTER (OUTPATIENT)
Dept: CARDIAC REHAB | Age: 68
Setting detail: THERAPIES SERIES
Discharge: HOME | End: 2023-09-15
Payer: MEDICARE

## 2023-09-15 PROCEDURE — 93798 PHYS/QHP OP CAR RHAB W/ECG: CPT

## 2023-09-15 NOTE — PROGRESS NOTES
Phase II Cardiac Rehab Individualized Treatment Plan-Final    Patient Name: Isamar Saenz  Date: 9/15/2023  ACCOUNT #: [de-identified]  Diagnosis: CABG   Onset Date: 5/12/2023  Referring Physician: Dr. Kennedy Gaitan  Session Number: 39   EXERCISE    Stages of Change:   [] pre-contemplation  [] Action   [] Contemplate    [x] Maintenance   [] Prep   [] Relapse          Exercise Prescription:  Mode: [x] TM   [x] B   [x] STP  [x] R   [x] UBE    Frequency: 3 days per week  Duration: 31-90 minutes  Intensity: 8.4-9.0 METs         THRR: 104-120  Progression:   Per F.I.T.T. protocol parameters on average 5-10 minutes every 1-2 weeks for the first 4-6 weeks. After 3-4 weeks completed, continue to gradually increase F.I.T.T. parameters gradually at the established duration on average 0.5-1.0 METs per 30 days over the course of the remaining program, as established by patient centered goals and guidelines, maintaining RPE 12-16. [] Resistance Training  8-15 bilateral upper extremity resistance exercise at 1-3 sets per lift, on 2-3 non-consecutive days using TheraBand/Free Weights/Weight Machine to 8-15 reps on at lease 2 occasions, maintaining RPE 12-16. Once repetition maximum has been achieved, additional weight sets may be added. Hypertension:  [x] Yes  [] No  Resting BP: 124/68  Peak Exercise BP: 156/64  BP Meds: Zestril, Toprol XL    Intervention:  Home Exercise:  Current Exercise -    [] Yes - type/frequency/intensity/duration   [x] No   [] Resistance Training  Progression:  8-12 bilateral upper extremity resistance exercise at 1-3 sets per lift, on 2 non-consecutive days using TheraBand/Free Weights/Weight Machine to 8-15 reps on at lease 2 occasions. Once repetition maximum has been achieved, additional weight sets may be added.     Education:   [x] Equipment [place]  [] Understanding BP   [x] S/S to report  [] Sodium     [x] Warm up/ Cool down  [] Exercise Prescription   [x] RPE Scale   [] Hot/Cold weather

## 2023-09-18 RX ORDER — SILDENAFIL 100 MG/1
100 TABLET, FILM COATED ORAL DAILY PRN
Qty: 30 TABLET | Refills: 1 | Status: SHIPPED | OUTPATIENT
Start: 2023-09-18

## 2023-09-18 NOTE — TELEPHONE ENCOUNTER
Patient requesting refill of Viagra to [facility].   Will check with pharmacy after 4pm.  Please advise

## 2023-10-02 RX ORDER — CLOPIDOGREL BISULFATE 75 MG/1
75 TABLET ORAL DAILY
Qty: 90 TABLET | Refills: 3 | Status: SHIPPED | OUTPATIENT
Start: 2023-10-02

## 2023-10-02 NOTE — TELEPHONE ENCOUNTER
The patient called in requesting a refill on his Plavix to be sent to [facility] in [place]. Order pended for yours signature.       If no call back the patient will check with his pharmacy after 2:00pm.

## 2023-10-12 ENCOUNTER — OFFICE VISIT (OUTPATIENT)
Dept: CARDIOLOGY CLINIC | Age: 68
End: 2023-10-12
Payer: MEDICARE

## 2023-10-12 VITALS
WEIGHT: 169.2 LBS | SYSTOLIC BLOOD PRESSURE: 113 MMHG | HEART RATE: 69 BPM | BODY MASS INDEX: 25.06 KG/M2 | HEIGHT: 69 IN | DIASTOLIC BLOOD PRESSURE: 72 MMHG

## 2023-10-12 DIAGNOSIS — I25.5 ISCHEMIC CARDIOMYOPATHY: ICD-10-CM

## 2023-10-12 DIAGNOSIS — E78.00 PURE HYPERCHOLESTEROLEMIA: ICD-10-CM

## 2023-10-12 DIAGNOSIS — I25.10 CORONARY ARTERY DISEASE INVOLVING NATIVE CORONARY ARTERY OF NATIVE HEART WITHOUT ANGINA PECTORIS: Primary | ICD-10-CM

## 2023-10-12 DIAGNOSIS — I10 ESSENTIAL HYPERTENSION: ICD-10-CM

## 2023-10-12 DIAGNOSIS — Z98.890 S/P CARDIAC CATH: ICD-10-CM

## 2023-10-12 DIAGNOSIS — Z95.820 S/P ANGIOPLASTY WITH STENT: ICD-10-CM

## 2023-10-12 DIAGNOSIS — Z95.1 S/P CABG X 2: ICD-10-CM

## 2023-10-12 PROCEDURE — 3074F SYST BP LT 130 MM HG: CPT | Performed by: INTERNAL MEDICINE

## 2023-10-12 PROCEDURE — 3017F COLORECTAL CA SCREEN DOC REV: CPT | Performed by: INTERNAL MEDICINE

## 2023-10-12 PROCEDURE — 1036F TOBACCO NON-USER: CPT | Performed by: INTERNAL MEDICINE

## 2023-10-12 PROCEDURE — G8427 DOCREV CUR MEDS BY ELIG CLIN: HCPCS | Performed by: INTERNAL MEDICINE

## 2023-10-12 PROCEDURE — 99214 OFFICE O/P EST MOD 30 MIN: CPT | Performed by: INTERNAL MEDICINE

## 2023-10-12 PROCEDURE — 1123F ACP DISCUSS/DSCN MKR DOCD: CPT | Performed by: INTERNAL MEDICINE

## 2023-10-12 PROCEDURE — G8484 FLU IMMUNIZE NO ADMIN: HCPCS | Performed by: INTERNAL MEDICINE

## 2023-10-12 PROCEDURE — 3078F DIAST BP <80 MM HG: CPT | Performed by: INTERNAL MEDICINE

## 2023-10-12 PROCEDURE — G8420 CALC BMI NORM PARAMETERS: HCPCS | Performed by: INTERNAL MEDICINE

## 2023-10-12 NOTE — PROGRESS NOTES
improve diet and exercise patterns to aid in medical management of this problem. Advised more plant based nutrition/meditarrean diet   Advised patient to call office or seek immediate medical attention if there is any new onset of  any chest pain, sob, palpitations, lightheadedness, dizziness, orthopnea, PND or pedal edema. All medication side effects were discussed in details.        RTC in 2 months    Kev Woo [facility]

## 2023-10-16 PROBLEM — R94.39 ABNORMAL NUCLEAR STRESS TEST: Status: RESOLVED | Noted: 2023-05-02 | Resolved: 2023-10-16

## 2023-11-27 RX ORDER — ASPIRIN 81 MG/1
81 TABLET ORAL DAILY
Qty: 90 TABLET | Refills: 1 | Status: SHIPPED | OUTPATIENT
Start: 2023-11-27

## 2023-12-07 RX ORDER — ATORVASTATIN CALCIUM 40 MG/1
40 TABLET, FILM COATED ORAL DAILY
Qty: 90 TABLET | Refills: 3 | Status: SHIPPED | OUTPATIENT
Start: 2023-12-07

## 2023-12-16 RX ORDER — LISINOPRIL 2.5 MG/1
2.5 TABLET ORAL DAILY
Qty: 90 TABLET | Refills: 1 | Status: SHIPPED | OUTPATIENT
Start: 2023-12-16

## 2024-02-02 ENCOUNTER — OFFICE VISIT (OUTPATIENT)
Dept: FAMILY MEDICINE CLINIC | Age: 69
End: 2024-02-02

## 2024-02-02 VITALS
RESPIRATION RATE: 18 BRPM | HEIGHT: 69 IN | BODY MASS INDEX: 27.36 KG/M2 | HEART RATE: 68 BPM | WEIGHT: 184.7 LBS | DIASTOLIC BLOOD PRESSURE: 66 MMHG | SYSTOLIC BLOOD PRESSURE: 134 MMHG

## 2024-02-02 DIAGNOSIS — I25.10 CORONARY ARTERY DISEASE INVOLVING NATIVE CORONARY ARTERY OF NATIVE HEART WITHOUT ANGINA PECTORIS: ICD-10-CM

## 2024-02-02 DIAGNOSIS — Z95.820 S/P ANGIOPLASTY WITH STENT: ICD-10-CM

## 2024-02-02 DIAGNOSIS — Z12.5 SCREENING PSA (PROSTATE SPECIFIC ANTIGEN): ICD-10-CM

## 2024-02-02 DIAGNOSIS — Z95.1 S/P CABG X 2: ICD-10-CM

## 2024-02-02 DIAGNOSIS — R73.01 IFG (IMPAIRED FASTING GLUCOSE): ICD-10-CM

## 2024-02-02 DIAGNOSIS — I50.32 CHRONIC DIASTOLIC CHF (CONGESTIVE HEART FAILURE) (HCC): ICD-10-CM

## 2024-02-02 DIAGNOSIS — I10 ESSENTIAL HYPERTENSION: ICD-10-CM

## 2024-02-02 DIAGNOSIS — C73 PAPILLARY THYROID CARCINOMA (HCC): ICD-10-CM

## 2024-02-02 DIAGNOSIS — R73.01 IMPAIRED FASTING BLOOD SUGAR: ICD-10-CM

## 2024-02-02 DIAGNOSIS — E89.0 POSTSURGICAL HYPOTHYROIDISM: ICD-10-CM

## 2024-02-02 DIAGNOSIS — Z00.00 INITIAL MEDICARE ANNUAL WELLNESS VISIT: Primary | ICD-10-CM

## 2024-02-02 ASSESSMENT — PATIENT HEALTH QUESTIONNAIRE - PHQ9
SUM OF ALL RESPONSES TO PHQ9 QUESTIONS 1 & 2: 0
2. FEELING DOWN, DEPRESSED OR HOPELESS: 0
SUM OF ALL RESPONSES TO PHQ QUESTIONS 1-9: 0
1. LITTLE INTEREST OR PLEASURE IN DOING THINGS: 0

## 2024-02-02 ASSESSMENT — ENCOUNTER SYMPTOMS
ABDOMINAL PAIN: 0
NAUSEA: 0
COUGH: 0
SHORTNESS OF BREATH: 0

## 2024-02-02 ASSESSMENT — LIFESTYLE VARIABLES: HOW OFTEN DO YOU HAVE A DRINK CONTAINING ALCOHOL: NEVER

## 2024-02-02 NOTE — PROGRESS NOTES
Subjective:      Patient ID: Bogdan Dowling 1955 is a 68 y.o. male here for evaluation.     Chief Complaint   Patient presents with    Medicare AWV    6 Month Follow-Up    Health Maintenance     Needs a pneumonia vaccine and a colonscopy    Referral - General     ENDO       Patient Active Problem List   Diagnosis    Tinnitus    Hearing loss d/t noise    Essential hypertension    Impaired fasting blood sugar    Primary osteoarthritis of left knee    Liver disease    Orthostatic hypotension    S/P angioplasty with stent of the LM to LCX with 100% lad lesion and mod rca lesion- med RX 06/04/2018    Coronary artery disease involving native coronary artery of native heart without angina pectoris    Non-ST elevation (NSTEMI) myocardial infarction (HCC)    Gait instability    Left-sided weakness    Otorrhagia of left ear    Hyperlipidemia    History of thyroid cancer    Chronic nonintractable headache    SOB (shortness of breath) on exertion and cold exposure    Abnormal EKG    Cardiomyopathy, ischemic    S/P cardiac cath EDp 11 mmhg, LM TO LCX- STENT PATENT, OM1 OSTIAL 60% STENOSIS, LAD PROX 100%, RCA PROX /OSTIAL 100%, COLLATERAL FROM LCX TO LAD AND RCA-CABG CONSIDERED    CAD, multiple vessel    Ischemic cardiomyopathy    S/P CABG x 2 may 2023       COLONOSCOPY - declined    CARDIO - Dr. Negro ALMARAZ - [facility]    Denies CP, SOB or chest tightness.  CABG and STENT hx.  Stays active.  Follows with CARDIO.     Follows ENDO at [facility] for Thyroid Cancer hx.  Surgical removal. Would like ENDO closer to home to follow.  Lives in [place].     On Synthroid 125 mcg    Lab Results   Component Value Date    TSH 0.995 01/28/2022       BP wnl      Vitals:    02/02/24 0959   BP: 134/66   Pulse: 68   Resp: 18        Labs due    Lab Results   Component Value Date    LABA1C 6.0 05/10/2023    LABA1C 6.2 08/10/2020    LABA1C 5.7 03/16/2018     Lab Results   Component Value Date     05/10/2023       No components found for:

## 2024-02-02 NOTE — PATIENT INSTRUCTIONS
You may receive a survey regarding the care you received during your visit.  Your input is valuable to us.  We encourage you to complete and return your survey.  We hope you will choose us in the future for your healthcare needs.         Learning About Dental Care for Older Adults  Dental care for older adults: Overview  Dental care for older people is much the same as for younger adults. But older adults do have concerns that younger adults do not. Older adults may have problems with gum disease and decay on the roots of their teeth. They may need missing teeth replaced or broken fillings fixed. Or they may have dentures that need to be cared for. Some older adults may have trouble holding a toothbrush.  You can help remind the person you are caring for to brush and floss their teeth or to clean their dentures. In some cases, you may need to do the brushing and other dental care tasks. People who have trouble using their hands or who have dementia may need this extra help.  How can you help with dental care?  Normal dental care  To keep the teeth and gums healthy:  Brush the teeth with fluoride toothpaste twice a day--in the morning and at night--and floss at least once a day. Plaque can quickly build up on the teeth of older adults.  Watch for the signs of gum disease. These signs include gums that bleed after brushing or after eating hard foods, such as apples.  See a dentist regularly. Many experts recommend checkups every 6 months.  Keep the dentist up to date on any new medications the person is taking.  Encourage a balanced diet that includes whole grains, vegetables, and fruits, and that is low in saturated fat and sodium.  Encourage the person you're caring for not to use tobacco products. They can affect dental and general health.  Many older adults have a fixed income and feel that they can't afford dental care. But most [facility] and [facility] have programs in which dentists help older adults by lowering fees.

## 2024-02-02 NOTE — PROGRESS NOTES
Medicare Annual Wellness Visit    Bogdan Dowling is here for Medicare AWV, 6 Month Follow-Up, Health Maintenance (Needs a pneumonia vaccine and a colonscopy), and Referral - General (ENDO)    Assessment & Plan   Initial Medicare annual wellness visit    Recommendations for Preventive Services Due: see orders and patient instructions/AVS.  Recommended screening schedule for the next 5-10 years is provided to the patient in written form: see Patient Instructions/AVS.     No follow-ups on file.     Subjective       Patient's complete Health Risk Assessment and screening values have been reviewed and are found in Flowsheets. The following problems were reviewed today and where indicated follow up appointments were made and/or referrals ordered.    Positive Risk Factor Screenings with Interventions:                Activity, Diet, and Weight:  On average, how many days per week do you engage in moderate to strenuous exercise (like a brisk walk)?: 3 days  On average, how many minutes do you engage in exercise at this level?: 10 min    Do you eat balanced/healthy meals regularly?: (!) No    Body mass index is 27.28 kg/m².    Do you eat balanced/healthy meals regularly Interventions:  See AVS for additional education material        Dentist Screen:  Have you seen the dentist within the past year?: (!) No    Intervention:  Advised to schedule with their dentist     Vision Screen:  Do you have difficulty driving, watching TV, or doing any of your daily activities because of your eyesight?: No  Have you had an eye exam within the past year?: (!) No  No results found.    Interventions:   Patient encouraged to make appointment with their eye specialist                    Objective   Vitals:    02/02/24 0959   BP: 134/66   Site: Right Upper Arm   Position: Sitting   Cuff Size: Medium Adult   Pulse: 68   Resp: 18   Weight: 83.8 kg (184 lb 11.2 oz)   Height: 1.753 m (5' 9\")      Body mass index is 27.28 kg/m².             Allergies

## 2024-03-18 DIAGNOSIS — C73 PAPILLARY THYROID CARCINOMA (HCC): Primary | ICD-10-CM

## 2024-03-18 DIAGNOSIS — E89.0 POSTSURGICAL HYPOTHYROIDISM: ICD-10-CM

## 2024-03-28 RX ORDER — METOPROLOL SUCCINATE 50 MG/1
50 TABLET, EXTENDED RELEASE ORAL DAILY
Qty: 90 TABLET | Refills: 3 | Status: SHIPPED | OUTPATIENT
Start: 2024-03-28

## 2024-04-12 ENCOUNTER — HOSPITAL ENCOUNTER (OUTPATIENT)
Age: 69
Discharge: HOME | End: 2024-04-12
Payer: MEDICARE

## 2024-04-12 DIAGNOSIS — R73.01 IFG (IMPAIRED FASTING GLUCOSE): ICD-10-CM

## 2024-04-12 DIAGNOSIS — I25.10 CORONARY ARTERY DISEASE INVOLVING NATIVE CORONARY ARTERY OF NATIVE HEART WITHOUT ANGINA PECTORIS: ICD-10-CM

## 2024-04-12 DIAGNOSIS — I25.5 ISCHEMIC CARDIOMYOPATHY: ICD-10-CM

## 2024-04-12 DIAGNOSIS — Z95.820 S/P ANGIOPLASTY WITH STENT: ICD-10-CM

## 2024-04-12 DIAGNOSIS — Z98.890 S/P CARDIAC CATH: ICD-10-CM

## 2024-04-12 DIAGNOSIS — Z95.1 S/P CABG X 2: ICD-10-CM

## 2024-04-12 DIAGNOSIS — Z12.5 SCREENING PSA (PROSTATE SPECIFIC ANTIGEN): ICD-10-CM

## 2024-04-12 DIAGNOSIS — I10 ESSENTIAL HYPERTENSION: ICD-10-CM

## 2024-04-12 DIAGNOSIS — E89.0 POSTSURGICAL HYPOTHYROIDISM: ICD-10-CM

## 2024-04-12 DIAGNOSIS — E78.00 PURE HYPERCHOLESTEROLEMIA: ICD-10-CM

## 2024-04-12 LAB
ALBUMIN SERPL BCG-MCNC: 4.2 G/DL (ref 3.5–5.1)
ALP SERPL-CCNC: 112 U/L (ref 38–126)
ALT SERPL W/O P-5'-P-CCNC: 19 U/L (ref 11–66)
ANION GAP SERPL CALC-SCNC: 13 MEQ/L (ref 8–16)
AST SERPL-CCNC: 19 U/L (ref 5–40)
BASOPHILS ABSOLUTE: 0 THOU/MM3 (ref 0–0.1)
BASOPHILS NFR BLD AUTO: 0.7 %
BILIRUB CONJ SERPL-MCNC: < 0.2 MG/DL (ref 0–0.3)
BILIRUB SERPL-MCNC: 0.8 MG/DL (ref 0.3–1.2)
BUN SERPL-MCNC: 22 MG/DL (ref 7–22)
CALCIUM SERPL-MCNC: 9.1 MG/DL (ref 8.5–10.5)
CHLORIDE SERPL-SCNC: 106 MEQ/L (ref 98–111)
CHOLEST SERPL-MCNC: 135 MG/DL (ref 100–199)
CO2 SERPL-SCNC: 24 MEQ/L (ref 23–33)
CREAT SERPL-MCNC: 1.2 MG/DL (ref 0.4–1.2)
DEPRECATED MEAN GLUCOSE BLD GHB EST-ACNC: 129 MG/DL (ref 70–126)
DEPRECATED RDW RBC AUTO: 49.4 FL (ref 35–45)
EOSINOPHIL NFR BLD AUTO: 3.4 %
EOSINOPHILS ABSOLUTE: 0.2 THOU/MM3 (ref 0–0.4)
ERYTHROCYTE [DISTWIDTH] IN BLOOD BY AUTOMATED COUNT: 15.2 % (ref 11.5–14.5)
GFR SERPL CREATININE-BSD FRML MDRD: 65 ML/MIN/1.73M2
GLUCOSE SERPL-MCNC: 110 MG/DL (ref 70–108)
HBA1C MFR BLD HPLC: 6.3 % (ref 4.4–6.4)
HCT VFR BLD AUTO: 46 % (ref 42–52)
HDLC SERPL-MCNC: 52 MG/DL
HGB BLD-MCNC: 15.4 GM/DL (ref 14–18)
IMM GRANULOCYTES # BLD AUTO: 0.02 THOU/MM3 (ref 0–0.07)
IMM GRANULOCYTES NFR BLD AUTO: 0.4 %
LDLC SERPL CALC-MCNC: 66 MG/DL
LYMPHOCYTES ABSOLUTE: 1.4 THOU/MM3 (ref 1–4.8)
LYMPHOCYTES NFR BLD AUTO: 25.7 %
MAGNESIUM SERPL-MCNC: 2.2 MG/DL (ref 1.6–2.4)
MCH RBC QN AUTO: 29.6 PG (ref 26–33)
MCHC RBC AUTO-ENTMCNC: 33.5 GM/DL (ref 32.2–35.5)
MCV RBC AUTO: 88.5 FL (ref 80–94)
MONOCYTES ABSOLUTE: 0.4 THOU/MM3 (ref 0.4–1.3)
MONOCYTES NFR BLD AUTO: 7.1 %
NEUTROPHILS NFR BLD AUTO: 62.7 %
NRBC BLD AUTO-RTO: 0 /100 WBC
PLATELET # BLD AUTO: 250 THOU/MM3 (ref 130–400)
PMV BLD AUTO: 10.9 FL (ref 9.4–12.4)
POTASSIUM SERPL-SCNC: 4.6 MEQ/L (ref 3.5–5.2)
PROT SERPL-MCNC: 7.2 G/DL (ref 6.1–8)
PSA SERPL-MCNC: 0.63 NG/ML (ref 0–1)
RBC # BLD AUTO: 5.2 MILL/MM3 (ref 4.7–6.1)
SEGMENTED NEUTROPHILS ABSOLUTE COUNT: 3.4 THOU/MM3 (ref 1.8–7.7)
SODIUM SERPL-SCNC: 143 MEQ/L (ref 135–145)
T4 FREE SERPL-MCNC: 1.55 NG/DL (ref 0.93–1.68)
TRIGL SERPL-MCNC: 87 MG/DL (ref 0–199)
TSH SERPL DL<=0.005 MIU/L-ACNC: 0.67 UIU/ML (ref 0.4–4.2)
WBC # BLD AUTO: 5.5 THOU/MM3 (ref 4.8–10.8)

## 2024-04-12 PROCEDURE — 83735 ASSAY OF MAGNESIUM: CPT

## 2024-04-12 PROCEDURE — 36415 COLL VENOUS BLD VENIPUNCTURE: CPT

## 2024-04-12 PROCEDURE — 85025 COMPLETE CBC W/AUTO DIFF WBC: CPT

## 2024-04-12 PROCEDURE — 80061 LIPID PANEL: CPT

## 2024-04-12 PROCEDURE — 83036 HEMOGLOBIN GLYCOSYLATED A1C: CPT

## 2024-04-12 PROCEDURE — 84153 ASSAY OF PSA TOTAL: CPT

## 2024-04-12 PROCEDURE — 84443 ASSAY THYROID STIM HORMONE: CPT

## 2024-04-12 PROCEDURE — 84439 ASSAY OF FREE THYROXINE: CPT

## 2024-04-12 PROCEDURE — 82248 BILIRUBIN DIRECT: CPT

## 2024-04-12 PROCEDURE — 80053 COMPREHEN METABOLIC PANEL: CPT

## 2024-04-18 ENCOUNTER — OFFICE VISIT (OUTPATIENT)
Dept: CARDIOLOGY CLINIC | Age: 69
End: 2024-04-18
Payer: MEDICARE

## 2024-04-18 VITALS
SYSTOLIC BLOOD PRESSURE: 143 MMHG | WEIGHT: 180.2 LBS | HEIGHT: 69 IN | DIASTOLIC BLOOD PRESSURE: 80 MMHG | HEART RATE: 72 BPM | BODY MASS INDEX: 26.69 KG/M2

## 2024-04-18 DIAGNOSIS — I25.10 CORONARY ARTERY DISEASE INVOLVING NATIVE CORONARY ARTERY OF NATIVE HEART WITHOUT ANGINA PECTORIS: Primary | ICD-10-CM

## 2024-04-18 DIAGNOSIS — I10 ESSENTIAL HYPERTENSION: ICD-10-CM

## 2024-04-18 DIAGNOSIS — Z95.1 S/P CABG X 2: ICD-10-CM

## 2024-04-18 DIAGNOSIS — E78.00 PURE HYPERCHOLESTEROLEMIA: ICD-10-CM

## 2024-04-18 DIAGNOSIS — I95.1 ORTHOSTATIC HYPOTENSION: ICD-10-CM

## 2024-04-18 DIAGNOSIS — I25.5 CARDIOMYOPATHY, ISCHEMIC: ICD-10-CM

## 2024-04-18 DIAGNOSIS — R94.31 ABNORMAL EKG: ICD-10-CM

## 2024-04-18 DIAGNOSIS — Z95.820 S/P ANGIOPLASTY WITH STENT: ICD-10-CM

## 2024-04-18 DIAGNOSIS — Z98.890 S/P CARDIAC CATH: ICD-10-CM

## 2024-04-18 PROCEDURE — 99214 OFFICE O/P EST MOD 30 MIN: CPT | Performed by: INTERNAL MEDICINE

## 2024-04-18 PROCEDURE — 3077F SYST BP >= 140 MM HG: CPT | Performed by: INTERNAL MEDICINE

## 2024-04-18 PROCEDURE — 1123F ACP DISCUSS/DSCN MKR DOCD: CPT | Performed by: INTERNAL MEDICINE

## 2024-04-18 PROCEDURE — G8417 CALC BMI ABV UP PARAM F/U: HCPCS | Performed by: INTERNAL MEDICINE

## 2024-04-18 PROCEDURE — 1036F TOBACCO NON-USER: CPT | Performed by: INTERNAL MEDICINE

## 2024-04-18 PROCEDURE — 3079F DIAST BP 80-89 MM HG: CPT | Performed by: INTERNAL MEDICINE

## 2024-04-18 PROCEDURE — 3017F COLORECTAL CA SCREEN DOC REV: CPT | Performed by: INTERNAL MEDICINE

## 2024-04-18 PROCEDURE — G8427 DOCREV CUR MEDS BY ELIG CLIN: HCPCS | Performed by: INTERNAL MEDICINE

## 2024-04-18 RX ORDER — LISINOPRIL 5 MG/1
5 TABLET ORAL DAILY
Qty: 90 TABLET | Refills: 3 | Status: SHIPPED | OUTPATIENT
Start: 2024-04-18

## 2024-04-18 NOTE — PROGRESS NOTES
R to L   collaterals  · There is 100% acute thrombotic occlusion of the left main to LCx   stenosis that represents culprit lesion  · Moderate non obstructive disease in RCA with R to L collaterals  · LVEDP mildly elevated at 15 mmHg; No LV-AO gradient on pull back    Intervention summary:  · Complex PCI of left main-LCx using OTW. Femoral access was obtained at   beginning of procedure in anticipation of possible hemodynamic support  · The left main-LCx lesion was pre-dilated (BALL MEDTRONIC [id number]) and   treated with BMSx1 ( BMS INTEGRITY 3.50 X 18). After deployment there was   no residual stenosis, no evidence of dissection and ASHA III flow   distally. Stent was then post dilated (BALL 3.5 X 12 RX NC EUPHO)    Recommendations  · Aspirin indefinitely. Plavix for at least 1 month given, preferably   longer given NSTEMI  · Transfer to Heart 3 team for further care      Conclusions      Procedure Summary   LM TO LCX- STENT -PATENT   LAD - PROXIMAL 100% OCCLUSION KNOW FROM CATH 2018 DONE AT [facility]   LCX-PATENT   OM1 OF THE LCX - MODERATE SIZED 65% OSTIAL STENOSIS   RCA - PROXIMAL AND OSTIAL 100 % OCCLUDED   COLLATERAL ALL MODERATE   Collaterals are seen from the LCX to the LAD Coronary Artery.   Collaterals are seen from the LCX to the PAD AND PLV OF THE RCA Coronary   Artery.   Abnormal wall motion.   moderate to Severe wall hypokinesis observed.   Moderate to severely Reduced LV systolic function.   LVEF approximately 35% .   EDP 11 MMHG   No Transaortic Gradient      CONCLUSION   MODERATE TO SEVERELY REDUCED Lv SYSTOLIC FUNCTION   SEVERE DOUBLE VESSEL CAD- TOTAL OCCLUSION OF PROX LAD AND OSTIAL RCA WITH   MODERATE COLLATERAL FROM LCX      IMAGE REVIEWED WITH DR. SERRA AND WE CONCURRED WITH CABG THE BEST COURSE   OF ACTION FOR THE PATIENT   D/W THE DR. LUZ      Recommendations   Continue with aggressive risk factor modification and medical therapy.   NEED CABG recommended.   I DID CV surgical consultation.   AND

## 2024-05-02 ENCOUNTER — TELEPHONE (OUTPATIENT)
Dept: CARDIOLOGY CLINIC | Age: 69
End: 2024-05-02

## 2024-05-02 NOTE — TELEPHONE ENCOUNTER
Patient calling in stating he has been trying to return a call to [place], maybe about an upcoming test he is having in [place]?  Please call him again.

## 2024-05-02 NOTE — TELEPHONE ENCOUNTER
Spoke with patient, confirmed that echo is scheduled in [place]. Patient is insistent on this, stating that they are down to one car, and that with gas prices the way they are, he is unwilling to make the trip to [place] for the echo when he can have it done at a [facility] in [place], he does not understand why this would be a problem. Advised patient that Dr. Tom may not be ok with having testing done in [place], but that the info had been noted, and if there was any information he needed to know in the  meantime, our office would be in touch.

## 2024-05-02 NOTE — TELEPHONE ENCOUNTER
Pt lmovm states since taking the medication his bp has gone up to 143/ did not give bottom numbers  after meds it went up to 173/?, he did not take medication today and its coming down to 150/81. He is asking recommendations?

## 2024-05-03 NOTE — TELEPHONE ENCOUNTER
Pt called, states he is unable to come to [place] to have echo done, states down to one vehicle, gas prices too high and missing day of work is not feasable, pt states has no other appts in [place] soon to multi-appt    Explained to pt our cardiologist prefer to read their owns test, informed pt will explain to dr Duarte pt concerns, informed pt to keep echo as scheduled in [place] for now, we will see what dr duarte says.      Pt can not have echo done in [place], test currently scheduled in [place], ok to keep as scheduled in [place]

## 2024-05-06 RX ORDER — LISINOPRIL 10 MG/1
10 TABLET ORAL DAILY
COMMUNITY

## 2024-05-06 NOTE — TELEPHONE ENCOUNTER
When I saw the pat in office bp was 143/80 and I increased lisinopril for CMP and minimal elevation of BP.  Headache can increase bp ; and /80 won't cause headache.  Hence need to address headache .may take  pain med like Tylenol for headache and once headache controlled my check his bp to find the true bp

## 2024-05-06 NOTE — TELEPHONE ENCOUNTER
Patient and patient's wife states when Lisinopril was increased to 5 mg. His BP went up and started with headaches. Patient declined increasing Lisinopril. Asking for something else?

## 2024-05-06 NOTE — TELEPHONE ENCOUNTER
iNCREASE LISINOPRIL TO 10 MG PO QD  IF STILL sbp > 140 PERSIST AFTER 1 WEEK OF MED - NEED TO COME TO OFFICE

## 2024-05-14 ENCOUNTER — HOSPITAL ENCOUNTER (OUTPATIENT)
Age: 69
Discharge: HOME | End: 2024-05-16
Payer: MEDICARE

## 2024-05-14 VITALS
HEIGHT: 69 IN | DIASTOLIC BLOOD PRESSURE: 80 MMHG | BODY MASS INDEX: 26.68 KG/M2 | SYSTOLIC BLOOD PRESSURE: 143 MMHG | WEIGHT: 180.12 LBS

## 2024-05-14 DIAGNOSIS — R94.31 ABNORMAL EKG: ICD-10-CM

## 2024-05-14 DIAGNOSIS — I25.10 CORONARY ARTERY DISEASE INVOLVING NATIVE CORONARY ARTERY OF NATIVE HEART WITHOUT ANGINA PECTORIS: ICD-10-CM

## 2024-05-14 DIAGNOSIS — Z95.1 S/P CABG X 2: ICD-10-CM

## 2024-05-14 DIAGNOSIS — I10 ESSENTIAL HYPERTENSION: ICD-10-CM

## 2024-05-14 DIAGNOSIS — I95.1 ORTHOSTATIC HYPOTENSION: ICD-10-CM

## 2024-05-14 DIAGNOSIS — I25.5 CARDIOMYOPATHY, ISCHEMIC: ICD-10-CM

## 2024-05-14 DIAGNOSIS — E78.00 PURE HYPERCHOLESTEROLEMIA: ICD-10-CM

## 2024-05-14 DIAGNOSIS — Z95.820 S/P ANGIOPLASTY WITH STENT: ICD-10-CM

## 2024-05-14 DIAGNOSIS — Z98.890 S/P CARDIAC CATH: ICD-10-CM

## 2024-05-14 LAB
ECHO AO SINUS VALSALVA DIAM: 3.7 CM
ECHO AO SINUS VALSALVA INDEX: 1.87 CM/M2
ECHO AO ST JNCT DIAM: 2.7 CM
ECHO AV CUSP MM: 2 CM
ECHO AV MEAN GRADIENT: 2 MMHG
ECHO AV MEAN VELOCITY: 0.6 M/S
ECHO AV PEAK GRADIENT: 3 MMHG
ECHO AV PEAK VELOCITY: 0.9 M/S
ECHO AV VELOCITY RATIO: 0.89
ECHO AV VTI: 21 CM
ECHO BSA: 1.99 M2
ECHO EST RA PRESSURE: 3 MMHG
ECHO LA AREA 2C: 24.7 CM2
ECHO LA AREA 4C: 22 CM2
ECHO LA MAJOR AXIS: 5.5 CM
ECHO LA MINOR AXIS: 5.5 CM
ECHO LA VOL BP: 82 ML (ref 18–58)
ECHO LA VOL MOD A2C: 92 ML (ref 18–58)
ECHO LA VOL MOD A4C: 73 ML (ref 18–58)
ECHO LA VOL/BSA BIPLANE: 41 ML/M2 (ref 16–34)
ECHO LA VOLUME INDEX MOD A2C: 46 ML/M2 (ref 16–34)
ECHO LA VOLUME INDEX MOD A4C: 37 ML/M2 (ref 16–34)
ECHO LV E' LATERAL VELOCITY: 8 CM/S
ECHO LV EDV A2C: 70 ML
ECHO LV EDV A4C: 64 ML
ECHO LV EDV INDEX A4C: 32 ML/M2
ECHO LV EDV NDEX A2C: 35 ML/M2
ECHO LV EJECTION FRACTION A2C: 40 %
ECHO LV EJECTION FRACTION A4C: 39 %
ECHO LV EJECTION FRACTION BIPLANE: 35 % (ref 55–100)
ECHO LV ESV A2C: 42 ML
ECHO LV ESV A4C: 39 ML
ECHO LV ESV INDEX A2C: 21 ML/M2
ECHO LV ESV INDEX A4C: 20 ML/M2
ECHO LV FRACTIONAL SHORTENING: 19 % (ref 28–44)
ECHO LV INTERNAL DIMENSION DIASTOLE INDEX: 2.37 CM/M2
ECHO LV INTERNAL DIMENSION DIASTOLIC: 4.7 CM (ref 4.2–5.9)
ECHO LV INTERNAL DIMENSION SYSTOLIC INDEX: 1.92 CM/M2
ECHO LV INTERNAL DIMENSION SYSTOLIC: 3.8 CM
ECHO LV IVSD: 1.1 CM (ref 0.6–1)
ECHO LV MASS 2D: 187.5 G (ref 88–224)
ECHO LV MASS INDEX 2D: 94.7 G/M2 (ref 49–115)
ECHO LV POSTERIOR WALL DIASTOLIC: 1.1 CM (ref 0.6–1)
ECHO LV RELATIVE WALL THICKNESS RATIO: 0.47
ECHO LVOT AV VTI INDEX: 0.87
ECHO LVOT MEAN GRADIENT: 1 MMHG
ECHO LVOT PEAK GRADIENT: 3 MMHG
ECHO LVOT PEAK VELOCITY: 0.8 M/S
ECHO LVOT VTI: 18.2 CM
ECHO MV A VELOCITY: 0.83 M/S
ECHO MV E DECELERATION TIME (DT): 218 MS
ECHO MV E VELOCITY: 0.44 M/S
ECHO MV E/A RATIO: 0.53
ECHO MV E/E' LATERAL: 5.5
ECHO PV MAX VELOCITY: 0.9 M/S
ECHO PV PEAK GRADIENT: 3 MMHG
ECHO RIGHT VENTRICULAR SYSTOLIC PRESSURE (RVSP): 24 MMHG
ECHO TV REGURGITANT MAX VELOCITY: 2.27 M/S
ECHO TV REGURGITANT PEAK GRADIENT: 21 MMHG

## 2024-05-14 PROCEDURE — 93306 TTE W/DOPPLER COMPLETE: CPT | Performed by: INTERNAL MEDICINE

## 2024-05-14 PROCEDURE — 93306 TTE W/DOPPLER COMPLETE: CPT

## 2024-05-22 RX ORDER — ASPIRIN 81 MG/1
81 TABLET ORAL DAILY
Qty: 90 TABLET | Refills: 1 | Status: SHIPPED | OUTPATIENT
Start: 2024-05-22

## 2024-05-26 RX ORDER — MULTIVITAMIN WITH FOLIC ACID 400 MCG
1 TABLET ORAL DAILY
Qty: 270 TABLET | Refills: 1 | Status: SHIPPED | OUTPATIENT
Start: 2024-05-26

## 2024-06-06 RX ORDER — LISINOPRIL 10 MG/1
10 TABLET ORAL DAILY
Qty: 90 TABLET | Refills: 3 | Status: SHIPPED | OUTPATIENT
Start: 2024-06-06

## 2024-06-24 RX ORDER — SILDENAFIL 100 MG/1
TABLET, FILM COATED ORAL
Qty: 30 TABLET | Refills: 1 | Status: SHIPPED | OUTPATIENT
Start: 2024-06-24

## 2024-08-21 ENCOUNTER — OFFICE VISIT (OUTPATIENT)
Age: 69
End: 2024-08-21
Payer: MEDICARE

## 2024-08-21 VITALS
WEIGHT: 192.13 LBS | SYSTOLIC BLOOD PRESSURE: 122 MMHG | HEART RATE: 67 BPM | HEIGHT: 69 IN | BODY MASS INDEX: 28.46 KG/M2 | DIASTOLIC BLOOD PRESSURE: 82 MMHG

## 2024-08-21 DIAGNOSIS — E03.9 HYPOTHYROIDISM (ACQUIRED): ICD-10-CM

## 2024-08-21 DIAGNOSIS — Z85.850 HISTORY OF THYROID CANCER: Primary | ICD-10-CM

## 2024-08-21 DIAGNOSIS — C73 PAPILLARY THYROID CARCINOMA (HCC): ICD-10-CM

## 2024-08-21 PROCEDURE — 1123F ACP DISCUSS/DSCN MKR DOCD: CPT | Performed by: INTERNAL MEDICINE

## 2024-08-21 PROCEDURE — 3074F SYST BP LT 130 MM HG: CPT | Performed by: INTERNAL MEDICINE

## 2024-08-21 PROCEDURE — 99204 OFFICE O/P NEW MOD 45 MIN: CPT | Performed by: INTERNAL MEDICINE

## 2024-08-21 PROCEDURE — G8417 CALC BMI ABV UP PARAM F/U: HCPCS | Performed by: INTERNAL MEDICINE

## 2024-08-21 PROCEDURE — 1036F TOBACCO NON-USER: CPT | Performed by: INTERNAL MEDICINE

## 2024-08-21 PROCEDURE — 3017F COLORECTAL CA SCREEN DOC REV: CPT | Performed by: INTERNAL MEDICINE

## 2024-08-21 PROCEDURE — 3079F DIAST BP 80-89 MM HG: CPT | Performed by: INTERNAL MEDICINE

## 2024-08-21 PROCEDURE — G8427 DOCREV CUR MEDS BY ELIG CLIN: HCPCS | Performed by: INTERNAL MEDICINE

## 2024-08-21 RX ORDER — LEVOTHYROXINE SODIUM 137 UG/1
137 TABLET ORAL DAILY
COMMUNITY
Start: 2024-06-24

## 2024-08-21 NOTE — PROGRESS NOTES
[facility] [place] [facility]  [facility]  [address]  [place] [address]  Dept: [phone]  Loc: [phone]     Visit Date: 8/21/2024    Bogdan Dowling is a 69 y.o. male who presents today for:  Chief Complaint   Patient presents with    New Patient     Papillary thyroid carcinoma, postsurgical hypothyroidism.             Subjective:    Bogdan Dowling is a 69 y.o. with Hx of CAD, CHF, HTN, HLD, and thyroid papillary carcinoma.  On US thyroid 03/21/2018: Multinodular goiter. Both of the right sided nodules fulfill criteria for ultrasound-guided FNA biopsy. In addition there is an abnormal right-sided cervical lymph node. This may be reactive. It is directly anterior to the jugular vein. Biopsy was done on 04/27/2018: Malignant, papillary thyroid carcinoma.   On 5/31/2018 pt had total thyroidectomy with cervical LAD dissection done. Has never received WHITE therapy, per previous notes refused. Pt was following with Dr. Autumn Barrios MD at [facility] till 02/2022.     Pt denies problems swallowing, dysphagia, or hoarseness. Pt is taking his Synthroid correctly.     Per pts daughter and wife, pt has memory problems that has been ongoing for over a year. He forgets conversations that happen the previous day, forgets money conversations with wife, and important details. Pt also states he's gained close to 20 lbs over last few months, suspects due to quitting his job in January as possible cause of increased weight. Pt has been feeling cold tendency for a very long time.     Fhx: No Hx of thyroid cancer.     HPI     Bogdan Dowling is a 69 y.o. , male who comes for Initial visit for hypothyroidism.  Mika Peña W, APRN - CNP    Bogdan Dowling was diagnosed with hypothyroidism in 2018.   Etiology of hypothyroidism is Surgery.   Current therapy includes synthroid [ELYSE] 137 mcg .  Current symptoms include Cold intolerance, Weight Gain, Fatigue, and memory loss .         Past

## 2024-10-01 ENCOUNTER — TELEMEDICINE (OUTPATIENT)
Dept: FAMILY MEDICINE CLINIC | Age: 69
End: 2024-10-01
Payer: MEDICARE

## 2024-10-01 DIAGNOSIS — I25.10 CORONARY ARTERY DISEASE INVOLVING NATIVE CORONARY ARTERY OF NATIVE HEART WITHOUT ANGINA PECTORIS: ICD-10-CM

## 2024-10-01 DIAGNOSIS — N52.9 ERECTILE DYSFUNCTION, UNSPECIFIED ERECTILE DYSFUNCTION TYPE: Primary | ICD-10-CM

## 2024-10-01 PROCEDURE — G8484 FLU IMMUNIZE NO ADMIN: HCPCS | Performed by: NURSE PRACTITIONER

## 2024-10-01 PROCEDURE — 3017F COLORECTAL CA SCREEN DOC REV: CPT | Performed by: NURSE PRACTITIONER

## 2024-10-01 PROCEDURE — 99213 OFFICE O/P EST LOW 20 MIN: CPT | Performed by: NURSE PRACTITIONER

## 2024-10-01 PROCEDURE — G2211 COMPLEX E/M VISIT ADD ON: HCPCS | Performed by: NURSE PRACTITIONER

## 2024-10-01 PROCEDURE — G8417 CALC BMI ABV UP PARAM F/U: HCPCS | Performed by: NURSE PRACTITIONER

## 2024-10-01 PROCEDURE — 1036F TOBACCO NON-USER: CPT | Performed by: NURSE PRACTITIONER

## 2024-10-01 PROCEDURE — 1123F ACP DISCUSS/DSCN MKR DOCD: CPT | Performed by: NURSE PRACTITIONER

## 2024-10-01 PROCEDURE — G8428 CUR MEDS NOT DOCUMENT: HCPCS | Performed by: NURSE PRACTITIONER

## 2024-10-01 RX ORDER — TADALAFIL 20 MG/1
20 TABLET ORAL
Qty: 30 TABLET | Refills: 1 | Status: SHIPPED | OUTPATIENT
Start: 2024-10-01

## 2024-10-01 ASSESSMENT — ENCOUNTER SYMPTOMS
NAUSEA: 0
SHORTNESS OF BREATH: 0
ABDOMINAL PAIN: 0
COUGH: 0

## 2024-10-01 NOTE — PROGRESS NOTES
Subjective:      Patient ID: Bogdan Dowling is a 69 y.o. male    Erectile Dysfunction  Pertinent negatives include no chills.   : Acute for     TELEHEALTH EVALUATION -- Audio/Visual     Patient identification was verified at the start of the visit: Yes    Services were provided through a video synchronous discussion virtually to substitute for in-person clinic visit. Patient and provider were located at their individual homes.    Patient has requested an audio/video evaluation for the following concern(s):    Chief Complaint   Patient presents with    Erectile Dysfunction       Viagra not as effective.  Hit or miss the benefit.  Libido strong.  No SE    Patient Active Problem List   Diagnosis    Tinnitus    Hearing loss d/t noise    Essential hypertension    Impaired fasting blood sugar    Primary osteoarthritis of left knee    Liver disease    Orthostatic hypotension    S/P angioplasty with stent of the LM to LCX with 100% lad lesion and mod rca lesion- med RX 06/04/2018    Coronary artery disease involving native coronary artery of native heart without angina pectoris    Non-ST elevation (NSTEMI) myocardial infarction (HCC)    Gait instability    Left-sided weakness    Otorrhagia of left ear    Hyperlipidemia    History of thyroid cancer    Chronic nonintractable headache    SOB (shortness of breath) on exertion and cold exposure    Abnormal EKG    Cardiomyopathy, ischemic    S/P cardiac cath EDp 11 mmhg, LM TO LCX- STENT PATENT, OM1 OSTIAL 60% STENOSIS, LAD PROX 100%, RCA PROX /OSTIAL 100%, COLLATERAL FROM LCX TO LAD AND RCA-CABG CONSIDERED    S/P CABG x 2 may 2023       Review of Systems   Constitutional:  Negative for chills and fever.   HENT: Negative.     Respiratory:  Negative for cough and shortness of breath.    Cardiovascular:  Negative for chest pain.   Gastrointestinal:  Negative for abdominal pain and nausea.   Skin:  Negative for rash.   Neurological:  Negative for dizziness, light-headedness and

## 2024-10-05 ENCOUNTER — APPOINTMENT (OUTPATIENT)
Dept: CT IMAGING | Age: 69
End: 2024-10-05
Payer: MEDICARE

## 2024-10-05 ENCOUNTER — HOSPITAL ENCOUNTER (EMERGENCY)
Age: 69
Discharge: HOME | End: 2024-10-05
Attending: STUDENT IN AN ORGANIZED HEALTH CARE EDUCATION/TRAINING PROGRAM
Payer: MEDICARE

## 2024-10-05 ENCOUNTER — APPOINTMENT (OUTPATIENT)
Dept: GENERAL RADIOLOGY | Age: 69
End: 2024-10-05
Payer: MEDICARE

## 2024-10-05 VITALS
RESPIRATION RATE: 16 BRPM | WEIGHT: 192 LBS | OXYGEN SATURATION: 93 % | BODY MASS INDEX: 28.34 KG/M2 | HEART RATE: 58 BPM | SYSTOLIC BLOOD PRESSURE: 161 MMHG | DIASTOLIC BLOOD PRESSURE: 92 MMHG | TEMPERATURE: 98.1 F
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
## 2024-10-05 DIAGNOSIS — T52.0X1S: Primary | ICD-10-CM

## 2024-10-05 LAB
ALBUMIN SERPL BCG-MCNC: 4 G/DL (ref 3.5–5.1)
ALP SERPL-CCNC: 111 U/L (ref 38–126)
ALT SERPL W/O P-5'-P-CCNC: 16 U/L (ref 11–66)
ANION GAP SERPL CALC-SCNC: 11 MEQ/L (ref 8–16)
AST SERPL-CCNC: 18 U/L (ref 5–40)
BASOPHILS ABSOLUTE: 0 THOU/MM3 (ref 0–0.1)
BASOPHILS NFR BLD AUTO: 0.6 %
BILIRUB SERPL-MCNC: 0.9 MG/DL (ref 0.3–1.2)
BUN SERPL-MCNC: 16 MG/DL (ref 7–22)
CALCIUM SERPL-MCNC: 8.9 MG/DL (ref 8.5–10.5)
CHLORIDE SERPL-SCNC: 106 MEQ/L (ref 98–111)
CO2 SERPL-SCNC: 24 MEQ/L (ref 23–33)
CREAT SERPL-MCNC: 1.2 MG/DL (ref 0.4–1.2)
DEPRECATED RDW RBC AUTO: 48.1 FL (ref 35–45)
EOSINOPHIL NFR BLD AUTO: 3.8 %
EOSINOPHILS ABSOLUTE: 0.2 THOU/MM3 (ref 0–0.4)
ERYTHROCYTE [DISTWIDTH] IN BLOOD BY AUTOMATED COUNT: 14.7 % (ref 11.5–14.5)
GFR SERPL CREATININE-BSD FRML MDRD: 65 ML/MIN/1.73M2
GLUCOSE SERPL-MCNC: 96 MG/DL (ref 70–108)
HCT VFR BLD AUTO: 42.8 % (ref 42–52)
HGB BLD-MCNC: 14.1 GM/DL (ref 14–18)
IMM GRANULOCYTES # BLD AUTO: 0.01 THOU/MM3 (ref 0–0.07)
IMM GRANULOCYTES NFR BLD AUTO: 0.2 %
LYMPHOCYTES ABSOLUTE: 1.4 THOU/MM3 (ref 1–4.8)
LYMPHOCYTES NFR BLD AUTO: 25.7 %
MCH RBC QN AUTO: 29.2 PG (ref 26–33)
MCHC RBC AUTO-ENTMCNC: 32.9 GM/DL (ref 32.2–35.5)
MCV RBC AUTO: 88.6 FL (ref 80–94)
MONOCYTES ABSOLUTE: 0.5 THOU/MM3 (ref 0.4–1.3)
MONOCYTES NFR BLD AUTO: 9.7 %
NEUTROPHILS ABSOLUTE: 3.2 THOU/MM3 (ref 1.8–7.7)
NEUTROPHILS NFR BLD AUTO: 60 %
NRBC BLD AUTO-RTO: 0 /100 WBC
OSMOLALITY SERPL CALC.SUM OF ELEC: 282.3 MOSMOL/KG (ref 275–300)
PLATELET # BLD AUTO: 209 THOU/MM3 (ref 130–400)
PMV BLD AUTO: 10 FL (ref 9.4–12.4)
POTASSIUM SERPL-SCNC: 3.8 MEQ/L (ref 3.5–5.2)
PROT SERPL-MCNC: 6.7 G/DL (ref 6.1–8)
RBC # BLD AUTO: 4.83 MILL/MM3 (ref 4.7–6.1)
SODIUM SERPL-SCNC: 141 MEQ/L (ref 135–145)
WBC # BLD AUTO: 5.3 THOU/MM3 (ref 4.8–10.8)

## 2024-10-05 PROCEDURE — 70450 CT HEAD/BRAIN W/O DYE: CPT

## 2024-10-05 PROCEDURE — 85025 COMPLETE CBC W/AUTO DIFF WBC: CPT

## 2024-10-05 PROCEDURE — 6370000000 HC RX 637 (ALT 250 FOR IP)

## 2024-10-05 PROCEDURE — 80053 COMPREHEN METABOLIC PANEL: CPT

## 2024-10-05 PROCEDURE — 71046 X-RAY EXAM CHEST 2 VIEWS: CPT

## 2024-10-05 PROCEDURE — 36415 COLL VENOUS BLD VENIPUNCTURE: CPT

## 2024-10-05 PROCEDURE — 99284 EMERGENCY DEPT VISIT MOD MDM: CPT

## 2024-10-05 PROCEDURE — 72170 X-RAY EXAM OF PELVIS: CPT

## 2024-10-05 PROCEDURE — 72125 CT NECK SPINE W/O DYE: CPT

## 2024-10-05 RX ORDER — ACETAMINOPHEN 500 MG
1000 TABLET ORAL ONCE
Status: COMPLETED | OUTPATIENT
Start: 2024-10-05 | End: 2024-10-05

## 2024-10-05 RX ADMIN — ACETAMINOPHEN 1000 MG: 500 TABLET ORAL at 20:43

## 2024-10-05 ASSESSMENT — ENCOUNTER SYMPTOMS
COUGH: 0
SHORTNESS OF BREATH: 0
RHINORRHEA: 0
GASTROINTESTINAL NEGATIVE: 1
PHOTOPHOBIA: 0
VOMITING: 0
TROUBLE SWALLOWING: 0
CHOKING: 0
BACK PAIN: 1
RESPIRATORY NEGATIVE: 1
EYE PAIN: 1
NAUSEA: 0
APNEA: 0
ABDOMINAL PAIN: 0
FACIAL SWELLING: 0
EYE REDNESS: 1
SINUS PAIN: 0

## 2024-10-05 ASSESSMENT — PAIN - FUNCTIONAL ASSESSMENT
PAIN_FUNCTIONAL_ASSESSMENT: NONE - DENIES PAIN
PAIN_FUNCTIONAL_ASSESSMENT: 0-10
PAIN_FUNCTIONAL_ASSESSMENT: NONE - DENIES PAIN

## 2024-10-05 ASSESSMENT — VISUAL ACUITY
OS: 20/70
OU: 20/70
OD: 20/70

## 2024-10-05 ASSESSMENT — PAIN DESCRIPTION - ONSET: ONSET: SUDDEN

## 2024-10-05 ASSESSMENT — PAIN DESCRIPTION - DESCRIPTORS
DESCRIPTORS: BURNING
DESCRIPTORS: BURNING

## 2024-10-05 ASSESSMENT — PAIN DESCRIPTION - FREQUENCY: FREQUENCY: CONTINUOUS

## 2024-10-05 ASSESSMENT — PAIN DESCRIPTION - LOCATION
LOCATION: EYE
LOCATION: EYE

## 2024-10-05 ASSESSMENT — PAIN DESCRIPTION - ORIENTATION
ORIENTATION: RIGHT;LEFT
ORIENTATION: RIGHT;LEFT

## 2024-10-05 ASSESSMENT — PAIN DESCRIPTION - PAIN TYPE
TYPE: ACUTE PAIN
TYPE: ACUTE PAIN

## 2024-10-05 ASSESSMENT — PAIN SCALES - GENERAL
PAINLEVEL_OUTOF10: 1
PAINLEVEL_OUTOF10: 0
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
## 2024-10-05 NOTE — ED NOTES
Pt resting on cot w/ eyes open upon entrance. Pt is eating a boxed lunch at this time. Respirations even and unlabored. Pt denies pain at this time. Family present at bedside.

## 2024-10-06 NOTE — ED NOTES
Pt resting on cot w/ eyes open upon entrance. Respirations even and unlabored. Pt denies pin at this time. Family present at bedside.

## 2024-10-06 NOTE — ED NOTES
Pt resting on cot w/ eyes open upon entrance. Respirations even and unlabored. Family present at bedside. Pt denies pain at this time.

## 2024-10-06 NOTE — ED NOTES
Pt resting on cot w/ eyes open upon entrance. Pt denies pain at this time. Respirations even and unlabored. Family present at bedside. Pt off floor to XR at this time.

## 2024-10-06 NOTE — DISCHARGE INSTRUCTIONS
Come back to the emergency department if you develop any fever or chills unresponsive to tylenol, severe shortness of breath and diffuse rash.    Follow up with PCP.
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
## 2024-10-07 ENCOUNTER — TELEPHONE (OUTPATIENT)
Dept: FAMILY MEDICINE CLINIC | Age: 69
End: 2024-10-07

## 2024-10-14 ENCOUNTER — TELEPHONE (OUTPATIENT)
Dept: CARDIOLOGY CLINIC | Age: 69
End: 2024-10-14

## 2024-10-18 ENCOUNTER — OFFICE VISIT (OUTPATIENT)
Dept: CARDIOLOGY CLINIC | Age: 69
End: 2024-10-18
Payer: MEDICARE

## 2024-10-18 VITALS
WEIGHT: 190.4 LBS | HEIGHT: 69 IN | BODY MASS INDEX: 28.2 KG/M2 | SYSTOLIC BLOOD PRESSURE: 128 MMHG | HEART RATE: 71 BPM | DIASTOLIC BLOOD PRESSURE: 83 MMHG

## 2024-10-18 DIAGNOSIS — I95.1 ORTHOSTATIC HYPOTENSION: ICD-10-CM

## 2024-10-18 DIAGNOSIS — E78.00 PURE HYPERCHOLESTEROLEMIA: ICD-10-CM

## 2024-10-18 DIAGNOSIS — Z98.890 S/P CARDIAC CATH: ICD-10-CM

## 2024-10-18 DIAGNOSIS — Z95.1 S/P CABG X 2: ICD-10-CM

## 2024-10-18 DIAGNOSIS — I25.10 CORONARY ARTERY DISEASE INVOLVING NATIVE CORONARY ARTERY OF NATIVE HEART WITHOUT ANGINA PECTORIS: Primary | ICD-10-CM

## 2024-10-18 DIAGNOSIS — I25.5 CARDIOMYOPATHY, ISCHEMIC: ICD-10-CM

## 2024-10-18 DIAGNOSIS — I10 ESSENTIAL HYPERTENSION: ICD-10-CM

## 2024-10-18 DIAGNOSIS — Z95.820 S/P ANGIOPLASTY WITH STENT: ICD-10-CM

## 2024-10-18 PROCEDURE — 1123F ACP DISCUSS/DSCN MKR DOCD: CPT | Performed by: INTERNAL MEDICINE

## 2024-10-18 PROCEDURE — 3079F DIAST BP 80-89 MM HG: CPT | Performed by: INTERNAL MEDICINE

## 2024-10-18 PROCEDURE — G8417 CALC BMI ABV UP PARAM F/U: HCPCS | Performed by: INTERNAL MEDICINE

## 2024-10-18 PROCEDURE — 93000 ELECTROCARDIOGRAM COMPLETE: CPT | Performed by: INTERNAL MEDICINE

## 2024-10-18 PROCEDURE — 99214 OFFICE O/P EST MOD 30 MIN: CPT | Performed by: INTERNAL MEDICINE

## 2024-10-18 PROCEDURE — G8427 DOCREV CUR MEDS BY ELIG CLIN: HCPCS | Performed by: INTERNAL MEDICINE

## 2024-10-18 PROCEDURE — 3017F COLORECTAL CA SCREEN DOC REV: CPT | Performed by: INTERNAL MEDICINE

## 2024-10-18 PROCEDURE — 1036F TOBACCO NON-USER: CPT | Performed by: INTERNAL MEDICINE

## 2024-10-18 PROCEDURE — 3074F SYST BP LT 130 MM HG: CPT | Performed by: INTERNAL MEDICINE

## 2024-10-18 PROCEDURE — G8484 FLU IMMUNIZE NO ADMIN: HCPCS | Performed by: INTERNAL MEDICINE

## 2024-10-18 NOTE — PROGRESS NOTES
reviewed.    Overall The pat is Stable and doing better    D/w the pat the plan of care    Discussed use, benefit, and side effects of prescribed medications. All patient questions answered. Pt voiced understanding. Instructed to continue current medications, diet and exercise. Continue risk factor modification and medical management. Patient agreed with treatment plan. Follow up as directed.    The patient is advised to attempt to improve diet and exercise patterns to aid in medical management of this problem.  Advised more plant based nutrition/meditarrean diet   Advised patient to call office or seek immediate medical attention if there is any new onset of  any chest pain, sob, palpitations, lightheadedness, dizziness, orthopnea, PND or pedal edema.   All medication side effects were discussed in details.       RTC in 4 months for med adjustement    Dara Tom MD,MD,FACC

## 2024-10-28 RX ORDER — CLOPIDOGREL BISULFATE 75 MG/1
75 TABLET ORAL DAILY
Qty: 90 TABLET | Refills: 3 | Status: SHIPPED | OUTPATIENT
Start: 2024-10-28

## 2024-10-28 NOTE — TELEPHONE ENCOUNTER
Pt called office requesting a refill of the Clopidogrel to [facility] in [place]. If no call back he will check with them after noon.

## 2024-10-31 ENCOUNTER — TELEPHONE (OUTPATIENT)
Dept: CARDIOLOGY CLINIC | Age: 69
End: 2024-10-31

## 2024-10-31 ENCOUNTER — TELEPHONE (OUTPATIENT)
Dept: FAMILY MEDICINE CLINIC | Age: 69
End: 2024-10-31

## 2024-10-31 DIAGNOSIS — N52.9 ERECTILE DYSFUNCTION, UNSPECIFIED ERECTILE DYSFUNCTION TYPE: ICD-10-CM

## 2024-10-31 RX ORDER — TADALAFIL 20 MG/1
20 TABLET ORAL
Qty: 30 TABLET | Refills: 1 | Status: SHIPPED | OUTPATIENT
Start: 2024-10-31

## 2024-10-31 NOTE — TELEPHONE ENCOUNTER
Pt called office stating he was prescribed Cialis recently and picked up the first bottle. He has not used any because his wife has been sick. Pt says the pharmacy told him the PCP canceled any refills that were left on the rx. Pt is asking why it was canceled.

## 2024-10-31 NOTE — TELEPHONE ENCOUNTER
Pt called and asked why we discontinued his Cialis.  After checking through the chart, it was taken off of his med list because he was on Viagra as well.  Therefore, we inadvertently took off the Cialis instead of the Viagra.  He states he has Viagra but does not take it.  (I have not taken Viagra off of his list)    Could your office be so kind to re-prescribe the Cialis?  He is concerned there will not be a refill when he needs it.

## 2024-12-09 RX ORDER — ATORVASTATIN CALCIUM 40 MG/1
40 TABLET, FILM COATED ORAL DAILY
Qty: 90 TABLET | Refills: 3 | Status: SHIPPED | OUTPATIENT
Start: 2024-12-09

## 2025-02-04 ENCOUNTER — HOSPITAL ENCOUNTER (OUTPATIENT)
Age: 70
Discharge: HOME | End: 2025-02-04
Payer: MEDICARE

## 2025-02-04 DIAGNOSIS — E03.9 HYPOTHYROIDISM (ACQUIRED): ICD-10-CM

## 2025-02-04 DIAGNOSIS — C73 PAPILLARY THYROID CARCINOMA (HCC): ICD-10-CM

## 2025-02-04 DIAGNOSIS — Z85.850 HISTORY OF THYROID CANCER: ICD-10-CM

## 2025-02-04 LAB
T4 FREE SERPL-MCNC: 1.6 NG/DL (ref 0.92–1.68)
TSH SERPL DL<=0.05 MIU/L-ACNC: 0.11 UIU/ML (ref 0.27–4.2)

## 2025-02-04 PROCEDURE — 84443 ASSAY THYROID STIM HORMONE: CPT

## 2025-02-04 PROCEDURE — 86800 THYROGLOBULIN ANTIBODY: CPT

## 2025-02-04 PROCEDURE — 84439 ASSAY OF FREE THYROXINE: CPT

## 2025-02-04 PROCEDURE — 36415 COLL VENOUS BLD VENIPUNCTURE: CPT

## 2025-02-04 PROCEDURE — 86376 MICROSOMAL ANTIBODY EACH: CPT

## 2025-02-05 LAB
THYROGLOBULIN AB: 20 IU/ML (ref 0–40)
THYROPEROXIDASE AB SERPL IA-ACNC: 7.6 IU/ML (ref 0–25)

## 2025-03-07 ENCOUNTER — OFFICE VISIT (OUTPATIENT)
Age: 70
End: 2025-03-07
Payer: MEDICARE

## 2025-03-07 VITALS
HEART RATE: 71 BPM | HEIGHT: 69 IN | DIASTOLIC BLOOD PRESSURE: 78 MMHG | RESPIRATION RATE: 16 BRPM | WEIGHT: 187.6 LBS | SYSTOLIC BLOOD PRESSURE: 134 MMHG | BODY MASS INDEX: 27.78 KG/M2

## 2025-03-07 DIAGNOSIS — R41.3 MEMORY CHANGE: ICD-10-CM

## 2025-03-07 DIAGNOSIS — Z85.850 HISTORY OF THYROID CANCER: ICD-10-CM

## 2025-03-07 DIAGNOSIS — E03.9 HYPOTHYROIDISM (ACQUIRED): Primary | ICD-10-CM

## 2025-03-07 PROCEDURE — 3017F COLORECTAL CA SCREEN DOC REV: CPT

## 2025-03-07 PROCEDURE — G8427 DOCREV CUR MEDS BY ELIG CLIN: HCPCS

## 2025-03-07 PROCEDURE — 3078F DIAST BP <80 MM HG: CPT

## 2025-03-07 PROCEDURE — 3075F SYST BP GE 130 - 139MM HG: CPT

## 2025-03-07 PROCEDURE — G8417 CALC BMI ABV UP PARAM F/U: HCPCS

## 2025-03-07 PROCEDURE — 1159F MED LIST DOCD IN RCRD: CPT

## 2025-03-07 PROCEDURE — 99214 OFFICE O/P EST MOD 30 MIN: CPT

## 2025-03-07 PROCEDURE — 1036F TOBACCO NON-USER: CPT

## 2025-03-07 PROCEDURE — 1123F ACP DISCUSS/DSCN MKR DOCD: CPT

## 2025-03-07 PROCEDURE — 1160F RVW MEDS BY RX/DR IN RCRD: CPT

## 2025-03-07 RX ORDER — LEVOTHYROXINE SODIUM 137 UG/1
137 TABLET ORAL DAILY
Qty: 30 TABLET | Refills: 5 | Status: SHIPPED | OUTPATIENT
Start: 2025-03-07

## 2025-03-07 NOTE — PROGRESS NOTES
[facility] [place] [facility]  [facility]  [address]  [place] [facility] [address]  Dept: [phone]  Loc: [phone]     Visit Date: 3/7/2025    Bogdan Dowling is a 69 y.o. male who presents today for:  Chief Complaint   Patient presents with    Follow-up     Hx of thyroid cancer            Subjective:      HPI     Bogdan Dowling is a 69 y.o. , male who comes for Follow up for hypothyroidism.  Mika Peña, APRN - CNP  Bogdan Dowling was diagnosed with hypothyroidism 6 year(s) ago.   Etiology of hypothyroidism is Surgery.   Current therapy includes levothyroxine 137 mcg daily .  Current symptoms include  patient reports unintentional weight loss .  Recent TSH 0.11 & t4 1.62 on 2/2025 TPO and Thyroglobulin atb negative on 2/2025. Patient never obtained follow up US ordered in August 2024.          Past Medical History:   Diagnosis Date    Arthritis     Chronic diastolic CHF (congestive heart failure) (HCC)     Coronary artery disease involving native coronary artery of native heart without angina pectoris 07/10/2018    Hyperlipidemia     Hypertension     Liver disease     Hep A 18 years ago cleared    Malignant neoplasm of thyroid gland (HCC) 03/15/2019      Past Surgical History:   Procedure Laterality Date    CARPAL TUNNEL RELEASE Left     CHOLECYSTECTOMY      CORONARY ARTERY BYPASS GRAFT N/A 5/12/2023    CABG x3 SAL; BALLOON PUMP performed by Graeme Lara MD at [facility] OR    HERNIA REPAIR      THYROIDECTOMY      TONSILLECTOMY AND ADENOIDECTOMY      TOTAL KNEE ARTHROPLASTY Left 2016     Family History   Problem Relation Age of Onset    Heart Disease Mother     High Blood Pressure Mother     High Cholesterol Mother     Arthritis Mother     Miscarriages / Stillbirths Mother     Heart Disease Father     Arthritis Father     Cancer Brother         melanoma     Social History     Tobacco Use    Smoking status: Never    Smokeless tobacco: Never   Substance Use Topics

## 2025-03-07 NOTE — PATIENT INSTRUCTIONS
Please obtain Thyroid US and Thyroid labs in 3 months, reduce your levothyroxine to 1 tablet 6 days per week and none on day 7

## 2025-04-17 RX ORDER — METOPROLOL SUCCINATE 50 MG/1
50 TABLET, EXTENDED RELEASE ORAL DAILY
Qty: 90 TABLET | Refills: 0 | Status: SHIPPED | OUTPATIENT
Start: 2025-04-17

## 2025-05-16 ENCOUNTER — OFFICE VISIT (OUTPATIENT)
Dept: CARDIOLOGY CLINIC | Age: 70
End: 2025-05-16
Payer: MEDICARE

## 2025-05-16 VITALS
HEART RATE: 64 BPM | BODY MASS INDEX: 27.93 KG/M2 | DIASTOLIC BLOOD PRESSURE: 75 MMHG | WEIGHT: 188.6 LBS | HEIGHT: 69 IN | SYSTOLIC BLOOD PRESSURE: 119 MMHG

## 2025-05-16 DIAGNOSIS — E78.00 PURE HYPERCHOLESTEROLEMIA: ICD-10-CM

## 2025-05-16 DIAGNOSIS — I10 ESSENTIAL HYPERTENSION: ICD-10-CM

## 2025-05-16 DIAGNOSIS — Z95.1 S/P CABG X 2: ICD-10-CM

## 2025-05-16 DIAGNOSIS — Z98.890 S/P CARDIAC CATH: ICD-10-CM

## 2025-05-16 DIAGNOSIS — I25.5 CARDIOMYOPATHY, ISCHEMIC: ICD-10-CM

## 2025-05-16 DIAGNOSIS — I95.1 ORTHOSTATIC HYPOTENSION: ICD-10-CM

## 2025-05-16 DIAGNOSIS — I25.10 CORONARY ARTERY DISEASE INVOLVING NATIVE CORONARY ARTERY OF NATIVE HEART WITHOUT ANGINA PECTORIS: Primary | ICD-10-CM

## 2025-05-16 DIAGNOSIS — Z95.820 S/P ANGIOPLASTY WITH STENT: ICD-10-CM

## 2025-05-16 DIAGNOSIS — R94.31 ABNORMAL EKG: ICD-10-CM

## 2025-05-16 PROCEDURE — G8417 CALC BMI ABV UP PARAM F/U: HCPCS | Performed by: INTERNAL MEDICINE

## 2025-05-16 PROCEDURE — 1160F RVW MEDS BY RX/DR IN RCRD: CPT | Performed by: INTERNAL MEDICINE

## 2025-05-16 PROCEDURE — 1036F TOBACCO NON-USER: CPT | Performed by: INTERNAL MEDICINE

## 2025-05-16 PROCEDURE — 1123F ACP DISCUSS/DSCN MKR DOCD: CPT | Performed by: INTERNAL MEDICINE

## 2025-05-16 PROCEDURE — 3017F COLORECTAL CA SCREEN DOC REV: CPT | Performed by: INTERNAL MEDICINE

## 2025-05-16 PROCEDURE — 3078F DIAST BP <80 MM HG: CPT | Performed by: INTERNAL MEDICINE

## 2025-05-16 PROCEDURE — 3074F SYST BP LT 130 MM HG: CPT | Performed by: INTERNAL MEDICINE

## 2025-05-16 PROCEDURE — G8427 DOCREV CUR MEDS BY ELIG CLIN: HCPCS | Performed by: INTERNAL MEDICINE

## 2025-05-16 PROCEDURE — 99214 OFFICE O/P EST MOD 30 MIN: CPT | Performed by: INTERNAL MEDICINE

## 2025-05-16 PROCEDURE — 1159F MED LIST DOCD IN RCRD: CPT | Performed by: INTERNAL MEDICINE

## 2025-05-16 NOTE — PROGRESS NOTES
Chief Complaint   Patient presents with    Check-Up    Coronary Artery Disease     Originally  patient here - ref by Dr. Terry Cole - NSTEMI  referred from [facility] post Thyriod surgery  He sufferred NSTEMI post op in [facility]    Hx of  post cardiac cath and CABG may 12, 2023        Pt here for a check up = GDMT?    EKG done 10-18-24    No more chest pain after CABG    Denied cp, dizziness, edema or palpitation    Sob on exertion better     Reviewed the record from [facility]          Patient Active Problem List   Diagnosis    Tinnitus    Hearing loss d/t noise    Essential hypertension    Impaired fasting blood sugar    Primary osteoarthritis of left knee    Liver disease    Orthostatic hypotension    S/P angioplasty with stent of the LM to LCX with 100% lad lesion and mod rca lesion- med RX 06/04/2018    Coronary artery disease involving native coronary artery of native heart without angina pectoris    Non-ST elevation (NSTEMI) myocardial infarction (HCC)    Gait instability    Left-sided weakness    Otorrhagia of left ear    Hyperlipidemia    History of thyroid cancer    Chronic nonintractable headache    SOB (shortness of breath) on exertion and cold exposure    Abnormal EKG    Cardiomyopathy, ischemic    S/P cardiac cath EDp 11 mmhg, LM TO LCX- STENT PATENT, OM1 OSTIAL 60% STENOSIS, LAD PROX 100%, RCA PROX /OSTIAL 100%, COLLATERAL FROM LCX TO LAD AND RCA-CABG CONSIDERED    S/P CABG x 2 may 2023       Past Surgical History:   Procedure Laterality Date    CARPAL TUNNEL RELEASE Left     CHOLECYSTECTOMY      CORONARY ARTERY BYPASS GRAFT N/A 5/12/2023    CABG x3 SAL; BALLOON PUMP performed by Graeme Lara MD at [facility] OR    HERNIA REPAIR      THYROIDECTOMY      TONSILLECTOMY AND ADENOIDECTOMY      TOTAL KNEE ARTHROPLASTY Left 2016       Allergies   Allergen Reactions    Pepto-Bismol [Bismuth Subsalicylate] Nausea And Vomiting        Family History   Problem Relation Age of Onset    Heart Disease Mother     High Blood Pressure Mother

## 2025-06-09 ENCOUNTER — HOSPITAL ENCOUNTER (OUTPATIENT)
Dept: ULTRASOUND IMAGING | Age: 70
Discharge: HOME | End: 2025-06-09
Payer: MEDICARE

## 2025-06-09 ENCOUNTER — HOSPITAL ENCOUNTER (OUTPATIENT)
Age: 70
Discharge: HOME | End: 2025-06-09
Payer: MEDICARE

## 2025-06-09 DIAGNOSIS — Z98.890 S/P CARDIAC CATH: ICD-10-CM

## 2025-06-09 DIAGNOSIS — E78.00 PURE HYPERCHOLESTEROLEMIA: ICD-10-CM

## 2025-06-09 DIAGNOSIS — I95.1 ORTHOSTATIC HYPOTENSION: ICD-10-CM

## 2025-06-09 DIAGNOSIS — Z85.850 HISTORY OF THYROID CANCER: ICD-10-CM

## 2025-06-09 DIAGNOSIS — Z95.820 S/P ANGIOPLASTY WITH STENT: ICD-10-CM

## 2025-06-09 DIAGNOSIS — I25.10 CORONARY ARTERY DISEASE INVOLVING NATIVE CORONARY ARTERY OF NATIVE HEART WITHOUT ANGINA PECTORIS: ICD-10-CM

## 2025-06-09 DIAGNOSIS — I10 ESSENTIAL HYPERTENSION: ICD-10-CM

## 2025-06-09 DIAGNOSIS — I25.5 CARDIOMYOPATHY, ISCHEMIC: ICD-10-CM

## 2025-06-09 DIAGNOSIS — Z95.1 S/P CABG X 2: ICD-10-CM

## 2025-06-09 DIAGNOSIS — R94.31 ABNORMAL EKG: ICD-10-CM

## 2025-06-09 LAB
ALBUMIN SERPL BCG-MCNC: 3.9 G/DL (ref 3.4–4.9)
ALP SERPL-CCNC: 128 U/L (ref 40–129)
ALT SERPL W/O P-5'-P-CCNC: 12 U/L (ref 10–50)
ANION GAP SERPL CALC-SCNC: 12 MEQ/L (ref 8–16)
AST SERPL-CCNC: 15 U/L (ref 10–50)
BILIRUB CONJ SERPL-MCNC: 0.4 MG/DL (ref 0–0.2)
BILIRUB SERPL-MCNC: 1 MG/DL (ref 0.3–1.2)
BUN SERPL-MCNC: 20 MG/DL (ref 8–23)
CALCIUM SERPL-MCNC: 9.3 MG/DL (ref 8.8–10.2)
CHLORIDE SERPL-SCNC: 107 MEQ/L (ref 98–111)
CHOLEST SERPL-MCNC: 130 MG/DL (ref 100–199)
CO2 SERPL-SCNC: 23 MEQ/L (ref 22–29)
CREAT SERPL-MCNC: 1.3 MG/DL (ref 0.7–1.2)
GFR SERPL CREATININE-BSD FRML MDRD: 59 ML/MIN/1.73M2
GLUCOSE SERPL-MCNC: 94 MG/DL (ref 74–109)
HDLC SERPL-MCNC: 45 MG/DL
LDLC SERPL CALC-MCNC: 70 MG/DL
MAGNESIUM SERPL-MCNC: 2.2 MG/DL (ref 1.6–2.6)
POTASSIUM SERPL-SCNC: 3.7 MEQ/L (ref 3.5–5.2)
PROT SERPL-MCNC: 6.7 G/DL (ref 6.4–8.3)
SODIUM SERPL-SCNC: 142 MEQ/L (ref 135–145)
TRIGL SERPL-MCNC: 76 MG/DL (ref 0–199)

## 2025-06-09 PROCEDURE — 82248 BILIRUBIN DIRECT: CPT

## 2025-06-09 PROCEDURE — 83735 ASSAY OF MAGNESIUM: CPT

## 2025-06-09 PROCEDURE — 80053 COMPREHEN METABOLIC PANEL: CPT

## 2025-06-09 PROCEDURE — 80061 LIPID PANEL: CPT

## 2025-06-09 PROCEDURE — 36415 COLL VENOUS BLD VENIPUNCTURE: CPT

## 2025-06-09 PROCEDURE — 76536 US EXAM OF HEAD AND NECK: CPT

## 2025-06-17 ENCOUNTER — RESULTS FOLLOW-UP (OUTPATIENT)
Age: 70
End: 2025-06-17

## 2025-06-17 DIAGNOSIS — Z85.850 HISTORY OF THYROID CANCER: Primary | ICD-10-CM

## 2025-06-18 NOTE — TELEPHONE ENCOUNTER
----- Message from KATIE Iglesias CNP sent at 6/17/2025  9:35 AM EDT -----  Please obtain follow up labs to correlate US findings.

## 2025-06-20 ENCOUNTER — HOSPITAL ENCOUNTER (OUTPATIENT)
Age: 70
Discharge: HOME | End: 2025-06-20
Payer: MEDICARE

## 2025-06-20 DIAGNOSIS — Z85.850 HISTORY OF THYROID CANCER: ICD-10-CM

## 2025-06-20 PROCEDURE — 84432 ASSAY OF THYROGLOBULIN: CPT

## 2025-06-20 PROCEDURE — 86800 THYROGLOBULIN ANTIBODY: CPT

## 2025-06-20 PROCEDURE — 36415 COLL VENOUS BLD VENIPUNCTURE: CPT

## 2025-06-23 LAB — THYROGLOBULIN ANTIBODY: 14 IU/ML (ref 0–40)

## 2025-06-24 LAB — THYROGLOB SERPL-MCNC: < 0.5 NG/ML (ref 1.3–31.8)

## 2025-07-21 RX ORDER — METOPROLOL SUCCINATE 50 MG/1
50 TABLET, EXTENDED RELEASE ORAL DAILY
Qty: 90 TABLET | Refills: 3 | Status: SHIPPED | OUTPATIENT
Start: 2025-07-21

## 2025-07-21 NOTE — TELEPHONE ENCOUNTER
Pt called office requesting a refill of the Metoprolol to [facility]. If no call back he will check with them after noon.

## 2025-07-29 ENCOUNTER — RESULTS FOLLOW-UP (OUTPATIENT)
Age: 70
End: 2025-07-29

## 2025-07-29 DIAGNOSIS — E03.9 HYPOTHYROIDISM (ACQUIRED): Primary | ICD-10-CM

## 2025-07-29 DIAGNOSIS — C73 PAPILLARY THYROID CARCINOMA (HCC): ICD-10-CM

## 2025-07-29 NOTE — RESULT ENCOUNTER NOTE
Please contact patient and document response, labs stable, repeat labs ordered for prior to follow up appointment.

## 2025-07-30 DIAGNOSIS — E03.9 HYPOTHYROIDISM (ACQUIRED): ICD-10-CM

## 2025-07-30 NOTE — TELEPHONE ENCOUNTER
----- Message from KATIE Iglesias CNP sent at 7/29/2025  1:30 PM EDT -----  Please contact patient and document response, labs stable, repeat labs ordered for prior to follow up appointment.

## 2025-07-31 ENCOUNTER — LAB (OUTPATIENT)
Dept: LAB | Age: 70
End: 2025-07-31

## 2025-07-31 DIAGNOSIS — C73 PAPILLARY THYROID CARCINOMA (HCC): ICD-10-CM

## 2025-07-31 DIAGNOSIS — E03.9 HYPOTHYROIDISM (ACQUIRED): ICD-10-CM

## 2025-07-31 LAB
T4 FREE SERPL-MCNC: 1.4 NG/DL (ref 0.92–1.68)
TSH SERPL DL<=0.05 MIU/L-ACNC: 0.51 UIU/ML (ref 0.27–4.2)

## 2025-07-31 RX ORDER — LEVOTHYROXINE SODIUM 137 UG/1
137 TABLET ORAL DAILY
Qty: 30 TABLET | Refills: 5 | Status: SHIPPED | OUTPATIENT
Start: 2025-07-31

## 2025-08-05 LAB — THYROGLOB SERPL-MCNC: < 0.5 NG/ML (ref 1.3–31.8)

## 2025-08-27 DIAGNOSIS — N52.9 ERECTILE DYSFUNCTION, UNSPECIFIED ERECTILE DYSFUNCTION TYPE: ICD-10-CM

## 2025-08-27 RX ORDER — TADALAFIL 20 MG/1
20 TABLET ORAL
Qty: 30 TABLET | Refills: 1 | Status: SHIPPED | OUTPATIENT
Start: 2025-08-27

## 2025-09-01 ENCOUNTER — HOSPITAL ENCOUNTER (EMERGENCY)
Age: 70
Discharge: HOME | End: 2025-09-01
Attending: EMERGENCY MEDICINE
Payer: MEDICARE

## 2025-09-01 ENCOUNTER — APPOINTMENT (OUTPATIENT)
Dept: GENERAL RADIOLOGY | Age: 70
End: 2025-09-01
Payer: MEDICARE

## 2025-09-01 VITALS
RESPIRATION RATE: 15 BRPM | OXYGEN SATURATION: 96 % | DIASTOLIC BLOOD PRESSURE: 77 MMHG | HEART RATE: 68 BPM | SYSTOLIC BLOOD PRESSURE: 124 MMHG | TEMPERATURE: 98.3 F

## 2025-09-01 DIAGNOSIS — M79.644 PAIN OF RIGHT THUMB: Primary | ICD-10-CM

## 2025-09-01 PROCEDURE — 99283 EMERGENCY DEPT VISIT LOW MDM: CPT

## 2025-09-01 PROCEDURE — 73130 X-RAY EXAM OF HAND: CPT

## 2025-09-01 PROCEDURE — 6370000000 HC RX 637 (ALT 250 FOR IP): Performed by: EMERGENCY MEDICINE

## 2025-09-01 RX ORDER — ACETAMINOPHEN 500 MG
1000 TABLET ORAL ONCE
Status: COMPLETED | OUTPATIENT
Start: 2025-09-01 | End: 2025-09-01

## 2025-09-01 RX ADMIN — ACETAMINOPHEN 1000 MG: 500 TABLET ORAL at 15:30

## 2025-09-01 ASSESSMENT — PAIN DESCRIPTION - ORIENTATION: ORIENTATION: RIGHT

## 2025-09-01 ASSESSMENT — PAIN DESCRIPTION - LOCATION: LOCATION: FINGER (COMMENT WHICH ONE)

## 2025-09-01 ASSESSMENT — PAIN - FUNCTIONAL ASSESSMENT: PAIN_FUNCTIONAL_ASSESSMENT: 0-10

## 2025-09-01 ASSESSMENT — PAIN SCALES - GENERAL: PAINLEVEL_OUTOF10: 9

## (undated) DEVICE — APPLIER CLP L9.375IN APER 2.1MM CLS L3.8MM 20 SM TI CLP

## (undated) DEVICE — THORACIC CATHETER,STRAIGHT: Brand: ARGYLE

## (undated) DEVICE — SUTURE PROL 3-0 36IN NONABSORB BLU 26MM SH 1/2 CIR P8522H

## (undated) DEVICE — SURGICAL PROCEDURE PACK OXGNTR ST MARYS

## (undated) DEVICE — EZ GLIDE AORTIC CANNULA: Brand: EDWARDS LIFESCIENCES EZ GLIDE AORTIC CANNULA

## (undated) DEVICE — TUBING INSUFFLATION SMK EVAC HI FLO SET PNEUMOCLEAR

## (undated) DEVICE — CLIP LIG M TI 6 SIL HNDL FOR OPN AND ENDOSCP SGL APPL

## (undated) DEVICE — APPLIER CLP L9.38IN M LIG TI DISP STR RNG HNDL LIGACLP

## (undated) DEVICE — SYSTEM ENDOSCP VES HARV W/ TOOL CANN SEAL SHT PRT BLNT TIP

## (undated) DEVICE — PACK SURGICAL PROC CUSTOM TISSUE PERFUSION SYSTEM SPY ELITE

## (undated) DEVICE — OPEN HEART SUPPLEMENT: Brand: MEDLINE INDUSTRIES, INC.

## (undated) DEVICE — SUTURE VCRL + SZ 3-0 L27IN ABSRB WHT CT-1 1/2 CIR VCP258H

## (undated) DEVICE — KIT BLWR MISTER 5P 15L W/ TBNG SET IRRIG MIST TO IMPROVE

## (undated) DEVICE — ATTACHMENT POSER 360DEG ARTC STBL FOR POS THE APEX OF THE

## (undated) DEVICE — CONNECTOR PERF 3/8X3/8X3/8IN EQL WYE

## (undated) DEVICE — CLIP LIG SM TI 6 BLU HNDL FOR OPN AND ENDOSCP SGL APPL

## (undated) DEVICE — DUAL STAGE VENOUS RETURN CANNULA: Brand: EDWARDS LIFESCIENCES DUAL STAGE VENOUS DRAINAGE CANNULA

## (undated) DEVICE — CATHETER IABP 8FR 50CC FBROPT CONN W INSRT KT TWO STATLOK

## (undated) DEVICE — DRAIN SURG SGL COLL PT TB FOR ATS BG OASIS

## (undated) DEVICE — THORACIC CATHETER,RIGHT ANGLE: Brand: ARGYLE

## (undated) DEVICE — KIT VEN DRNGE VAC ACCSRY PERF VAVD STOCK W/ SPEC TRAP

## (undated) DEVICE — APPLIER LIG CLP M L11IN TI STR RNG HNDL FOR 20 CLP DISP

## (undated) DEVICE — OPEN HRT CDS LF

## (undated) DEVICE — Device: Brand: SUCTION TIP

## (undated) DEVICE — DECANTER FLD 9IN ST BG FOR ASEP TRNSF OF FLD

## (undated) DEVICE — SET AUTOTRNS C175ML BOWL BTM OUTLT RESERVOIRXTRA

## (undated) DEVICE — CANNULA PERF L5.5IN DIA9FR AORT ROOT AG STD TIP W/ VENT LN

## (undated) DEVICE — SUTURE VCRL + SZ 0 L36IN ABSRB VLT L36MM CT-1 1/2 CIR VCP346H

## (undated) DEVICE — DRESSING GRMCDL 6 12FR D1N CNTR HOLE 4MM ANTMCRBL PRTCTVE DI

## (undated) DEVICE — CANNULA PVC RCSP 15FR SLD STYL W/ HNDL OVERALL LEN 11 IN

## (undated) DEVICE — CANNULA PERF 15FR L12.5IN RG STPCOCK WIREWOUND BODY